# Patient Record
Sex: FEMALE | Race: BLACK OR AFRICAN AMERICAN | Employment: FULL TIME | URBAN - METROPOLITAN AREA
[De-identification: names, ages, dates, MRNs, and addresses within clinical notes are randomized per-mention and may not be internally consistent; named-entity substitution may affect disease eponyms.]

---

## 2017-03-12 ENCOUNTER — ED HISTORICAL/CONVERTED ENCOUNTER (OUTPATIENT)
Dept: OTHER | Age: 22
End: 2017-03-12

## 2017-05-09 ENCOUNTER — IP HISTORICAL/CONVERTED ENCOUNTER (OUTPATIENT)
Dept: OTHER | Age: 22
End: 2017-05-09

## 2017-05-27 ENCOUNTER — IP HISTORICAL/CONVERTED ENCOUNTER (OUTPATIENT)
Dept: OTHER | Age: 22
End: 2017-05-27

## 2017-06-13 ENCOUNTER — ED HISTORICAL/CONVERTED ENCOUNTER (OUTPATIENT)
Dept: OTHER | Age: 22
End: 2017-06-13

## 2017-09-13 ENCOUNTER — IP HISTORICAL/CONVERTED ENCOUNTER (OUTPATIENT)
Dept: OTHER | Age: 22
End: 2017-09-13

## 2018-08-05 ENCOUNTER — ED HISTORICAL/CONVERTED ENCOUNTER (OUTPATIENT)
Dept: OTHER | Age: 23
End: 2018-08-05

## 2019-02-15 ENCOUNTER — ED HISTORICAL/CONVERTED ENCOUNTER (OUTPATIENT)
Dept: OTHER | Age: 24
End: 2019-02-15

## 2019-02-18 ENCOUNTER — ED HISTORICAL/CONVERTED ENCOUNTER (OUTPATIENT)
Dept: OTHER | Age: 24
End: 2019-02-18

## 2019-12-26 ENCOUNTER — ED HISTORICAL/CONVERTED ENCOUNTER (OUTPATIENT)
Dept: OTHER | Age: 24
End: 2019-12-26

## 2020-03-23 ENCOUNTER — ED HISTORICAL/CONVERTED ENCOUNTER (OUTPATIENT)
Dept: OTHER | Age: 25
End: 2020-03-23

## 2020-07-21 ENCOUNTER — ED HISTORICAL/CONVERTED ENCOUNTER (OUTPATIENT)
Dept: OTHER | Age: 25
End: 2020-07-21

## 2020-08-22 ENCOUNTER — ED HISTORICAL/CONVERTED ENCOUNTER (OUTPATIENT)
Dept: OTHER | Age: 25
End: 2020-08-22

## 2020-09-02 ENCOUNTER — ED HISTORICAL/CONVERTED ENCOUNTER (OUTPATIENT)
Dept: OTHER | Age: 25
End: 2020-09-02

## 2020-12-01 ENCOUNTER — APPOINTMENT (OUTPATIENT)
Dept: ULTRASOUND IMAGING | Age: 25
End: 2020-12-01
Attending: NURSE PRACTITIONER
Payer: MEDICAID

## 2020-12-01 ENCOUNTER — HOSPITAL ENCOUNTER (EMERGENCY)
Age: 25
Discharge: HOME | End: 2020-12-01
Attending: EMERGENCY MEDICINE
Payer: MEDICAID

## 2020-12-01 VITALS
WEIGHT: 180 LBS | TEMPERATURE: 99 F | DIASTOLIC BLOOD PRESSURE: 90 MMHG | RESPIRATION RATE: 20 BRPM | OXYGEN SATURATION: 100 % | HEIGHT: 64 IN | SYSTOLIC BLOOD PRESSURE: 136 MMHG | HEART RATE: 81 BPM | BODY MASS INDEX: 30.73 KG/M2

## 2020-12-01 DIAGNOSIS — O46.90 VAGINAL BLEEDING IN PREGNANCY: Primary | ICD-10-CM

## 2020-12-01 LAB
ABO + RH BLD: NORMAL
ALBUMIN SERPL-MCNC: 3.3 G/DL (ref 3.5–5)
ALBUMIN/GLOB SERPL: 0.8 {RATIO} (ref 1.1–2.2)
ALP SERPL-CCNC: 70 U/L (ref 45–117)
ALT SERPL-CCNC: 16 U/L (ref 12–78)
ANION GAP SERPL CALC-SCNC: 5 MMOL/L (ref 5–15)
APPEARANCE UR: CLEAR
AST SERPL W P-5'-P-CCNC: 11 U/L (ref 15–37)
BACTERIA URNS QL MICRO: NEGATIVE /HPF
BASOPHILS # BLD: 0 K/UL (ref 0–0.1)
BASOPHILS NFR BLD: 0 % (ref 0–1)
BILIRUB SERPL-MCNC: 0.1 MG/DL (ref 0.2–1)
BILIRUB UR QL: NEGATIVE
BLOOD GROUP ANTIBODIES SERPL: NEGATIVE
BUN SERPL-MCNC: 9 MG/DL (ref 6–20)
BUN/CREAT SERPL: 10 (ref 12–20)
CA-I BLD-MCNC: 9.1 MG/DL (ref 8.5–10.1)
CHLORIDE SERPL-SCNC: 106 MMOL/L (ref 97–108)
CO2 SERPL-SCNC: 27 MMOL/L (ref 21–32)
COLOR UR: ABNORMAL
CREAT SERPL-MCNC: 0.9 MG/DL (ref 0.55–1.02)
DIFFERENTIAL METHOD BLD: ABNORMAL
EOSINOPHIL # BLD: 0.1 K/UL (ref 0–0.4)
EOSINOPHIL NFR BLD: 1 % (ref 0–7)
ERYTHROCYTE [DISTWIDTH] IN BLOOD BY AUTOMATED COUNT: 15.7 % (ref 11.5–14.5)
GLOBULIN SER CALC-MCNC: 4.2 G/DL (ref 2–4)
GLUCOSE SERPL-MCNC: 85 MG/DL (ref 65–100)
GLUCOSE UR STRIP.AUTO-MCNC: NEGATIVE MG/DL
HCG SERPL-ACNC: 5196 MIU/ML (ref 0–6)
HCT VFR BLD AUTO: 36.1 % (ref 35–47)
HGB BLD-MCNC: 11.8 G/DL (ref 11.5–16)
HGB UR QL STRIP: ABNORMAL
IMM GRANULOCYTES # BLD AUTO: 0 K/UL (ref 0–0.04)
IMM GRANULOCYTES NFR BLD AUTO: 0 % (ref 0–0.5)
KETONES UR QL STRIP.AUTO: NEGATIVE MG/DL
LEUKOCYTE ESTERASE UR QL STRIP.AUTO: NEGATIVE
LYMPHOCYTES # BLD: 2.6 K/UL (ref 0.8–3.5)
LYMPHOCYTES NFR BLD: 29 % (ref 12–49)
MCH RBC QN AUTO: 27.1 PG (ref 26–34)
MCHC RBC AUTO-ENTMCNC: 32.7 G/DL (ref 30–36.5)
MCV RBC AUTO: 83 FL (ref 80–99)
MONOCYTES # BLD: 0.5 K/UL (ref 0–1)
MONOCYTES NFR BLD: 6 % (ref 5–13)
MUCOUS THREADS URNS QL MICRO: ABNORMAL /LPF
NEUTS SEG # BLD: 5.8 K/UL (ref 1.8–8)
NEUTS SEG NFR BLD: 64 % (ref 32–75)
NITRITE UR QL STRIP.AUTO: NEGATIVE
NRBC # BLD: 0 K/UL (ref 0–0.01)
NRBC BLD-RTO: 0 PER 100 WBC
PH UR STRIP: 6 [PH] (ref 5–8)
PLATELET # BLD AUTO: 322 K/UL (ref 150–400)
PMV BLD AUTO: 11.1 FL (ref 8.9–12.9)
POTASSIUM SERPL-SCNC: 4.2 MMOL/L (ref 3.5–5.1)
PROT SERPL-MCNC: 7.5 G/DL (ref 6.4–8.2)
PROT UR STRIP-MCNC: NEGATIVE MG/DL
RBC # BLD AUTO: 4.35 M/UL (ref 3.8–5.2)
RBC #/AREA URNS HPF: ABNORMAL /HPF (ref 0–5)
SODIUM SERPL-SCNC: 138 MMOL/L (ref 136–145)
SP GR UR REFRACTOMETRY: 1.02 (ref 1–1.03)
SPECIMEN EXP DATE BLD: NORMAL
UROBILINOGEN UR QL STRIP.AUTO: 0.1 EU/DL (ref 0.1–1)
WBC # BLD AUTO: 9 K/UL (ref 3.6–11)
WBC URNS QL MICRO: ABNORMAL /HPF (ref 0–4)

## 2020-12-01 PROCEDURE — 76817 TRANSVAGINAL US OBSTETRIC: CPT

## 2020-12-01 PROCEDURE — 99283 EMERGENCY DEPT VISIT LOW MDM: CPT

## 2020-12-01 PROCEDURE — 80053 COMPREHEN METABOLIC PANEL: CPT

## 2020-12-01 PROCEDURE — 36415 COLL VENOUS BLD VENIPUNCTURE: CPT

## 2020-12-01 PROCEDURE — 86900 BLOOD TYPING SEROLOGIC ABO: CPT

## 2020-12-01 PROCEDURE — 81001 URINALYSIS AUTO W/SCOPE: CPT

## 2020-12-01 PROCEDURE — 74011250637 HC RX REV CODE- 250/637: Performed by: EMERGENCY MEDICINE

## 2020-12-01 PROCEDURE — 85025 COMPLETE CBC W/AUTO DIFF WBC: CPT

## 2020-12-01 PROCEDURE — 84702 CHORIONIC GONADOTROPIN TEST: CPT

## 2020-12-01 RX ORDER — ACETAMINOPHEN 325 MG/1
650 TABLET ORAL ONCE
Status: COMPLETED | OUTPATIENT
Start: 2020-12-01 | End: 2020-12-01

## 2020-12-01 RX ADMIN — ACETAMINOPHEN 650 MG: 325 TABLET, FILM COATED ORAL at 21:42

## 2020-12-02 NOTE — ED PROVIDER NOTES
EMERGENCY DEPARTMENT HISTORY AND PHYSICAL EXAM        Date: 2020  Patient Name: Marlin Ibarra    History of Presenting Illness     Chief Complaint   Patient presents with    Vaginal Bleeding    Abdominal Cramping    Back Pain       History Provided By: Patient    HPI: Marlin Ibarra, 22 y.o.  female who is currently about 5 weeks gestational age based on last menstrual period who presents with pelvic pain and vaginal bleeding. Symptoms started today around 6:30 PM after she woke up from a nap. She relates she was having vaginal bleeding. She is also having some pelvic cramping in the lower abdomen on the left wrist constant, nonradiating, cramping, and without any aggravating or relieving factors. PCP: Dayna Pritchett MD    Current Outpatient Medications   Medication Sig Dispense Refill    ibuprofen (MOTRIN) 800 mg tablet Take 1 Tab by mouth every eight (8) hours. 30 Tab 0    prenatal vit-calcium-iron-fa (PRENATAL PLUS, CALCIUM CARB,) 27 mg iron- 1 mg tab Take 1 Tab by mouth daily. Indications: PREGNANCY      levETIRAcetam (KEPPRA) 500 mg tablet Take 500 mg by mouth two (2) times a day. Indications: TONIC-CLONIC EPILEPSY TREATMENT ADJUNCT         Past History     Past Medical History:  Past Medical History:   Diagnosis Date    Asthma     Epilepsy ([address]     Seizures [address] [address] [facility]     last seizure two weeks ago       Past Surgical History:  Past Surgical History:   Procedure Laterality Date    HX CYSTECTOMY Left 2008    Left eye cyst removal    HX OTHER SURGICAL         Family History:  No family history on file. Social History:  Social History     Tobacco Use    Smoking status: Never Smoker   Substance Use Topics    Alcohol use: No    Drug use: No       Allergies: Allergies   Allergen Reactions    Citrus And Derivatives Hives    Lamictal [Lamotrigine] Nausea and Vomiting       Review of Systems   Review of Systems   Constitutional: Negative for fever. HENT: Negative for congestion. Eyes: Negative for visual disturbance. Respiratory: Negative for shortness of breath. Cardiovascular: Negative for chest pain. Gastrointestinal: Positive for abdominal pain. Genitourinary: Negative for dysuria. Musculoskeletal: Negative for arthralgias. Skin: Negative for rash. Neurological: Negative for headaches. Physical Exam   General: No acute distress. Well-nourished. Skin: No rash. Head: Normocephalic. Atraumatic. Eye: No proptosis or conjunctival injections. Respiratory: No apparent respiratory distress. Gastrointestinal: Nondistended. No abdominal tenderness. Musculoskeletal: No obvious bony deformities. Psychiatric: Cooperative. Appropriate mood and affect. Diagnostic Study Results     Labs -     Recent Results (from the past 24 hour(s))   TYPE & SCREEN    Collection Time: 12/01/20  7:45 PM   Result Value Ref Range    Crossmatch Expiration 12/04/2020,2359     ABO/Rh(D) O Positive     Antibody screen Negative    CBC WITH AUTOMATED DIFF    Collection Time: 12/01/20  7:57 PM   Result Value Ref Range    WBC 9.0 3.6 - 11.0 K/uL    RBC 4.35 3.80 - 5.20 M/uL    HGB 11.8 11.5 - 16.0 g/dL    HCT 36.1 35.0 - 47.0 %    MCV 83.0 80.0 - 99.0 FL    MCH 27.1 26.0 - 34.0 PG    MCHC 32.7 30.0 - 36.5 g/dL    RDW 15.7 (H) 11.5 - 14.5 %    PLATELET 139 552 - 070 K/uL    MPV 11.1 8.9 - 12.9 FL    NRBC 0.0 0  WBC    ABSOLUTE NRBC 0.00 0.00 - 0.01 K/uL    NEUTROPHILS 64 32 - 75 %    LYMPHOCYTES 29 12 - 49 %    MONOCYTES 6 5 - 13 %    EOSINOPHILS 1 0 - 7 %    BASOPHILS 0 0 - 1 %    IMMATURE GRANULOCYTES 0 0.0 - 0.5 %    ABS. NEUTROPHILS 5.8 1.8 - 8.0 K/UL    ABS. LYMPHOCYTES 2.6 0.8 - 3.5 K/UL    ABS. MONOCYTES 0.5 0.0 - 1.0 K/UL    ABS. EOSINOPHILS 0.1 0.0 - 0.4 K/UL    ABS. BASOPHILS 0.0 0.0 - 0.1 K/UL    ABS. IMM.  GRANS. 0.0 0.00 - 0.04 K/UL    DF AUTOMATED     METABOLIC PANEL, COMPREHENSIVE    Collection Time: 12/01/20  7:57 PM   Result Value Ref Range    Sodium 138 136 - 145 mmol/L Potassium 4.2 3.5 - 5.1 mmol/L    Chloride 106 97 - 108 mmol/L    CO2 27 21 - 32 mmol/L    Anion gap 5 5 - 15 mmol/L    Glucose 85 65 - 100 mg/dL    BUN 9 6 - 20 mg/dL    Creatinine 0.90 0.55 - 1.02 mg/dL    BUN/Creatinine ratio 10 (L) 12 - 20      GFR est AA >60 >60 ml/min/1.73m2    GFR est non-AA >60 >60 ml/min/1.73m2    Calcium 9.1 8.5 - 10.1 mg/dL    Bilirubin, total 0.1 (L) 0.2 - 1.0 mg/dL    AST (SGOT) 11 (L) 15 - 37 U/L    ALT (SGPT) 16 12 - 78 U/L    Alk. phosphatase 70 45 - 117 U/L    Protein, total 7.5 6.4 - 8.2 g/dL    Albumin 3.3 (L) 3.5 - 5.0 g/dL    Globulin 4.2 (H) 2.0 - 4.0 g/dL    A-G Ratio 0.8 (L) 1.1 - 2.2     URINALYSIS W/MICROSCOPIC    Collection Time: 12/01/20  8:06 PM   Result Value Ref Range    Color Yellow/Straw      Appearance Clear Clear      Specific gravity 1.016 1.003 - 1.030      pH (UA) 6.0 5.0 - 8.0      Protein Negative Negative mg/dL    Glucose Negative Negative mg/dL    Ketone Negative Negative mg/dL    Bilirubin Negative Negative      Blood Small (A) Negative      Urobilinogen 0.1 0.1 - 1.0 EU/dL    Nitrites Negative Negative      Leukocyte Esterase Negative Negative      WBC 0-4 0 - 4 /hpf    RBC 0-5 0 - 5 /hpf    Bacteria Negative Negative /hpf    Mucus Trace /lpf   BETA HCG, QT    Collection Time: 12/01/20  8:17 PM   Result Value Ref Range    Beta HCG, QT 5,196.0 (H) 0 - 6 mIU/mL       Radiologic Studies -   US UTS TRANSVAGINAL OB   Final Result   IMPRESSION:   1. Intrauterine gestational sac with fetal pole and yolk sac. Crown-rump length   is too small to adequately day, gestational sac diameter is equivalent to 5   weeks 4 day gestation. No fetal heartbeat is detected. This could represent an   early intrauterine gestational sac, too small to adequately assess the   heartbeat. Recommend follow-up. 2. Left ovarian cyst with left corpus luteum cyst.   3. Small amount of free fluid in the posterior pelvis.    4. Small amount of probable fluid in the lower uterine segment CT Results  (Last 48 hours)    None        CXR Results  (Last 48 hours)    None          Medical Decision Making and ED Course     I reviewed the available vital signs, nursing notes, past medical history, past surgical history, family history, and social history. Vital Signs - Reviewed the patient's vital signs. Patient Vitals for the past 12 hrs:   Temp Pulse Resp BP SpO2   12/01/20 1930 99 °F (37.2 °C) 81 20 (!) 136/90 100 %         Medical Decision Making:   Presented with vaginal bleeding and abdominal pain in pregnancy. The differential diagnosis is ectopic pregnancy, threatened miscarriage, spontaneous miscarriage. Negative test so far. Ultrasound shows normal-appearing pregnancy at this stage. Unclear if a spontaneous miscarriage or threatened miscarriage. Recommended following up with OB and to return if symptoms worsen. Disposition     Discharged    DISCHARGE PLAN:  1. Current Discharge Medication List      CONTINUE these medications which have NOT CHANGED    Details   ibuprofen (MOTRIN) 800 mg tablet Take 1 Tab by mouth every eight (8) hours. Qty: 30 Tab, Refills: 0      prenatal vit-calcium-iron-fa (PRENATAL PLUS, CALCIUM CARB,) 27 mg iron- 1 mg tab Take 1 Tab by mouth daily. Indications: PREGNANCY      levETIRAcetam (KEPPRA) 500 mg tablet Take 500 mg by mouth two (2) times a day. Indications: TONIC-CLONIC EPILEPSY TREATMENT ADJUNCT           2. Follow-up Information     Follow up With Specialties Details Why [facility] EMERGENCY DEPT Emergency Medicine Go today As soon as possible if symptoms worsen [address] [facility] [address] [phone]    Your OB/GYN  Schedule an appointment as soon as possible for a visit in 3 days          3. Return to ED if worse     Diagnosis     Clinical impression:   1.  Vaginal bleeding in pregnancy       Attestation:  Please note that this dictation was completed with [employer], the computer voice recognition software. Quite often unanticipated grammatical, syntax, homophones, and other interpretive errors are inadvertently transcribed by the computer software. Please disregard these errors. Please excuse any errors that have escaped final proofreading. Thank you.   Alexia Israel, DO

## 2020-12-02 NOTE — ED TRIAGE NOTES
Patient reports that she is approximately 5 weeks pregnant LMP 10/27/2020 with new onset vaginal bleeding tonight with clots. Patient reports that she is experiencing abdominal cramping, lower back pain and nausea as well. Patient reports h/o seizures with a seizure Sunday and is unsure if this triggered bleeding.

## 2020-12-07 ENCOUNTER — TELEPHONE (OUTPATIENT)
Dept: OBGYN CLINIC | Age: 25
End: 2020-12-07

## 2020-12-07 NOTE — TELEPHONE ENCOUNTER
Call received at 2:00PM  [address] patient    22year old new patient LMP 10/27/2020 5w6d pregnant patient to be seen on 12/22/2020 for first ob and ultrasound    Patient calling to say that she has been seen at [facility] ER( see notes) for vaginal bleeding that started on 12/1/2020    Patient reports the bleeding was heavy at first and she was changing a pad every hour and yesterday she went to the [facility] ER and had beta done which she was told was 18,000  Patient reports the bleeding is not lighter and she is changing her pad every 2-3 hours. Patient reports abdominal cramping at 7 on the pain scale of 1-10. Patient blood type is 0+  Patient was provided with the office fax number to have records faxed to the office      Please advise    ? Ultrasound and ov ?  Labs

## 2020-12-07 NOTE — TELEPHONE ENCOUNTER
Patient was placed on the schedule to be seen at 9am on 12.9.2020 (  to do the ultrasound)    Patient was provided pain and bleeding precautions. Patient was advised to increase her po fluids, rest and can take tylenol    Patient verbalized understanding.

## 2020-12-09 ENCOUNTER — OFFICE VISIT (OUTPATIENT)
Dept: OBGYN CLINIC | Age: 25
End: 2020-12-09
Payer: MEDICAID

## 2020-12-09 VITALS
WEIGHT: 191 LBS | DIASTOLIC BLOOD PRESSURE: 68 MMHG | SYSTOLIC BLOOD PRESSURE: 124 MMHG | BODY MASS INDEX: 32.61 KG/M2 | HEIGHT: 64 IN

## 2020-12-09 DIAGNOSIS — O20.0 THREATENED MISCARRIAGE: Primary | ICD-10-CM

## 2020-12-09 PROCEDURE — 76817 TRANSVAGINAL US OBSTETRIC: CPT | Performed by: OBSTETRICS & GYNECOLOGY

## 2020-12-09 PROCEDURE — 99203 OFFICE O/P NEW LOW 30 MIN: CPT | Performed by: OBSTETRICS & GYNECOLOGY

## 2020-12-09 RX ORDER — LACOSAMIDE 50 MG/1
TABLET ORAL
COMMUNITY
Start: 2019-08-08 | End: 2021-06-02

## 2020-12-09 RX ORDER — ARIPIPRAZOLE 5 MG/1
TABLET ORAL DAILY
COMMUNITY
End: 2021-06-02

## 2020-12-09 NOTE — PROGRESS NOTES
Threatened AB note    Krishna Turner is a ,  22 y.o. female [address] Patient's last menstrual period was 10/27/2020. making her 6 weeks pregnant. She has not had prenatal care. She went to the ER on 20 and 20. She presents with spotting and cramping that started on 2020 . The amount of bleeding is described as light to moderate . The cramps are described as minimal. She has not passed tissue. Bleeding has decreased. Moderate pregnancy symptoms. She had a positive pregnancy test on 2020. She has had a recent pelvic ultrasound on 2020 in the ER that showed:  IMPRESSION:  1. Intrauterine gestational sac with fetal pole and yolk sac. Crown-rump length  is too small to adequately day, gestational sac diameter is equivalent to 5  weeks 4 day gestation. No fetal heartbeat is detected. This could represent an  early intrauterine gestational sac, too small to adequately assess the  heartbeat. Recommend follow-up. 2. Left ovarian cyst with left corpus luteum cyst.  3. Small amount of free fluid in the posterior pelvis. 4. Small amount of probable fluid in the lower uterine segment    Her past medical history is not significant for risk factors for ectopic pregnancy. She has not had pelvic pain. The patient specifically denies right or left pelvic pain. She does have a history of a spontaneous . She has not had a recent injury or trauma. Additional complaints: none.      Past Medical History:   Diagnosis Date    Asthma     Epilepsy ([address]     Seizures ([address]     last seizure two weeks ago     Past Surgical History:   Procedure Laterality Date    HX OTHER SURGICAL Left 2008    Left eye cyst removal     Social History     Occupational History    Not on file   Tobacco Use    Smoking status: Current Every Day Smoker     Packs/day: 5.00     Years: 2.00     Pack years: 10.00     Types: Cigarettes    Smokeless tobacco: Never Used   Substance and Sexual Activity    Alcohol use: Yes     Frequency: 2-3 times a week    Drug use: No    Sexual activity: Yes     Partners: Male     Birth control/protection: None     History reviewed. No pertinent family history. Allergies   Allergen Reactions    Citrus And Derivatives Hives    Lamictal [Lamotrigine] Nausea and Vomiting     Prior to Admission medications    Medication Sig Start Date End Date Taking? Authorizing Provider   lacosamide (Vimpat) 50 mg tab tablet Vimpat 50 mg tablet   TK 1 T PO BID 8/8/19  Yes Provider, Historical   ARIPiprazole (Abilify) 5 mg tablet Take  by mouth daily. Yes Provider, Historical   levETIRAcetam (KEPPRA) 500 mg tablet Take 500 mg by mouth two (2) times a day. Indications: TONIC-CLONIC EPILEPSY TREATMENT ADJUNCT   Yes Provider, Historical   ibuprofen (MOTRIN) 800 mg tablet Take 1 Tab by mouth every eight (8) hours. 8/31/16   Jazmin Lange MD   prenatal vit-calcium-iron-fa (PRENATAL PLUS, CALCIUM CARB,) 27 mg iron- 1 mg tab Take 1 Tab by mouth daily.  Indications: PREGNANCY    Provider, Historical        Review of Systems: History obtained from the patient  Constitutional: negative for weight loss, fever, night sweats  Breast: negative for breast lumps, nipple discharge, galactorrhea  GI: negative for change in bowel habits, abdominal pain, black or bloody stools  : negative for frequency, dysuria, hematuria, vaginal discharge  MSK: negative for back pain, joint pain, muscle pain  Skin: negative for itching, rash, hives  Psych: negative for anxiety, depression, change in mood      Objective:  Visit Vitals  /68   Ht 5' 4\" (1.626 m)   Wt 191 lb (86.6 kg)   LMP 10/27/2020   Breastfeeding No   BMI 32.79 kg/m²       Physical Exam:   PHYSICAL EXAMINATION    Constitutional  · Appearance: well-nourished, well developed, alert, in no acute distress    Gastrointestinal  · Abdominal Examination: abdomen non-tender to palpation, normal bowel sounds, no masses present  · Liver and spleen: no hepatomegaly present, spleen not palpable  · Hernias: no hernias identified    Genitourinary  · External Genitalia: normal appearance for age, no discharge present, no tenderness present, no inflammatory lesions present, no masses present, no atrophy present  · Vagina: normal vaginal vault without central or paravaginal defects, bloody discharge present, no inflammatory lesions present, no masses present  · Bladder: non-tender to palpation  · Urethra: appears normal  · Cervix: normal   · Uterus: Retroflexed, enlarged, soft, mildly tender with normal shape and consistency  · Adnexa: no adnexal tenderness present, no adnexal masses present  · Perineum: perineum within normal limits, no evidence of trauma, no rashes or skin lesions present  · Anus: anus within normal limits, no hemorrhoids present  · Inguinal Lymph Nodes: no lymphadenopathy present    Skin  · General Inspection: no rash, no lesions identified    Neurologic/Psychiatric  · Mental Status:  · Orientation: grossly oriented to person, place and time  · Mood and Affect: mood normal, affect appropriate    Assessment:   Threatened AB--US looks good today and matches up with LMP    Plan:     RTO: 2.5 weeks for US by me and EOB    Transvaginal First Trimester Ultrasound Procedure    Annabelle Abraham is a ,  22 y.o. female [address] Patient's last menstrual period was 10/27/2020. This makes her currently 6 weeks and 1 day of gestation by dates. She is here today for early pregnancy ultrasound for pregnancy dating. Ultrasound results:   A overton intrauterine pregnancy with visible cardiac activity is seen. The yolk sac is visible and measures approximately 0.41 cm in diameter.   The crown rump length of the fetus is measurable at 0.56 cm, consistent with 6 weeks and 2 days  Subchorionic hemorrhage was not seen  Right Ovary - Not well seen  Left Ovary - NORMAL with CL  Comment: no [address] seen

## 2020-12-09 NOTE — LETTER
NOTIFICATION RETURN TO WORK / SCHOOL 
 
12/9/2020 9:28 AM 
 
Ms. Rufina Bhatt [address] [id number] To Whom It May Concern: 
 
Rufina Bhatt is currently under the care of [address] [place] [address]. She was seen in our office today for an appointment. If there are questions or concerns please have the patient contact our office. Sincerely, Glo Frye MD

## 2020-12-09 NOTE — PATIENT INSTRUCTIONS
Miscarriage: Care Instructions  Overview     For some, the loss of a pregnancy can be very hard. You may wonder why it happened. Miscarriages are common and are not caused by exercise, stress, or sex. Most happen because the fertilized egg in the uterus does not develop normally. There is no treatment that can stop a miscarriage. If you are having a miscarriage, you have several options. As long as you do not have heavy blood loss, fever, weakness, or other signs of infection, you can let a miscarriage follow its own course. This can take several days. If you don't want to wait, you can take medicine to help the pregnancy tissue pass. Or you can have a surgical procedure to remove the tissue. Your body will recover over the next several weeks. Having a miscarriage does not mean you cannot have a normal pregnancy in the future. Follow-up care is a key part of your treatment and safety. Be sure to make and go to all appointments, and call your doctor if you are having problems. It's also a good idea to know your test results and keep a list of the medicines you take. How can you care for yourself at home? · You will probably have some vaginal bleeding for 1 to 2 weeks. It may be similar to or slightly heavier than a normal period. The bleeding should get lighter after a week. Use sanitary pads until you stop bleeding. Using pads makes it easier to monitor your bleeding. · Take an over-the-counter pain medicine, such as acetaminophen (Tylenol), ibuprofen (Advil, Motrin), or naproxen (Aleve) for cramps. Read and follow all instructions on the label. You may have cramps for several days after the miscarriage. · Do not take two or more pain medicines at the same time unless the doctor told you to. Many pain medicines have acetaminophen, which is Tylenol. Too much acetaminophen (Tylenol) can be harmful. · Ask your doctor when it is okay for you to have sex.   · You may return to your normal activities if you feel well enough to do so. · If you would like to try to get pregnant again, it is usually safe whenever you feel ready. Talk with your doctor about any future pregnancy plans. · If you do not want to get pregnant, ask your doctor about birth control. You can get pregnant again before your next period starts if you are not using birth control. · You may be low in iron because of blood loss. Eat a balanced diet that is high in iron and vitamin C. Foods rich in iron include red meat, shellfish, eggs, beans, and leafy green vegetables. Foods high in vitamin C include citrus fruits, tomatoes, and broccoli. Talk to your doctor about whether you need to take iron pills or a multivitamin. · For some, the loss of a pregnancy can be very hard. You may have a range of emotions. If you need help coping, talking to family members, friends, a counselor, or your doctor may help. When should you call for help? Call 911 anytime you think you may need emergency care. For example, call if:    · You passed out (lost consciousness). Call your doctor now or seek immediate medical care if:    · You have severe vaginal bleeding.     · You are dizzy or lightheaded, or you feel like you may faint.     · You have new or worse pain in your belly or pelvis.     · You have a fever.     · You have vaginal discharge that smells bad. Watch closely for changes in your health, and be sure to contact your doctor if:    · You do not get better as expected. Where can you learn more? Go to http://www.gray.com/  Enter E802 in the search box to learn more about \"Miscarriage: Care Instructions. \"  Current as of: February 11, 2020               Content Version: 12.6  © 8833-6455 [employer]. Care instructions adapted under license by [employer] (which disclaims liability or warranty for this information).  If you have questions about a medical condition or this instruction, always ask your healthcare professional. [address] any warranty or liability for your use of this information.

## 2020-12-22 ENCOUNTER — OFFICE VISIT (OUTPATIENT)
Dept: OBGYN CLINIC | Age: 25
End: 2020-12-22
Payer: MEDICAID

## 2020-12-22 VITALS
BODY MASS INDEX: 32.85 KG/M2 | HEIGHT: 64 IN | SYSTOLIC BLOOD PRESSURE: 112 MMHG | DIASTOLIC BLOOD PRESSURE: 62 MMHG | WEIGHT: 192.4 LBS

## 2020-12-22 DIAGNOSIS — N92.6 MISSED MENSES: ICD-10-CM

## 2020-12-22 DIAGNOSIS — Z32.01 PREGNANCY EXAMINATION OR TEST, POSITIVE RESULT: Primary | ICD-10-CM

## 2020-12-22 DIAGNOSIS — N76.0 VAGINITIS AND VULVOVAGINITIS: ICD-10-CM

## 2020-12-22 PROCEDURE — 0500F INITIAL PRENATAL CARE VISIT: CPT | Performed by: OBSTETRICS & GYNECOLOGY

## 2020-12-22 NOTE — PATIENT INSTRUCTIONS

## 2020-12-22 NOTE — PROGRESS NOTES
Current pregnancy history:    Diana Ansari is a 22 y.o. female who presents for the evaluation of pregnancy. Patient's last menstrual period was 10/27/2020. LMP history:  The date of her LMP is certain. Her last menstrual period was normal and lasted for 4 to 5 days. A urine pregnancy test was positive 4 weeks ago. She was not on the pill at conception. Based on her LMP, her EDC is 8/3/21 and her EGA is 8 weeks, 0 days. Her menstrual cycles are regular and occur approximately every 28 days  and range from 3 to 5 days. The last menses lasted  the usual number of days. Ultrasound data:  She had an  ultrasound done by the ultrasound tech today which revealed a viable overton pregnancy with a gestational age of 10 weeks and 0 days giving an [facility] of 21. Pregnancy symptoms:    Since her LMP she has experienced  urinary frequency, breast tenderness, and nausea. She has been vomiting over the last few weeks. Associated signs and symptoms which she denies: dysuria, discharge, vaginal bleeding. She states she has gained weight:  Approximately 1 pound over the last few weeks. Relevant past pregnancy history:   She has the following pregnancy history: Her last pregnancy was uncomplicated. She has no history of  delivery. Relevant past medical history:(relevant to this pregnancy): noncontributory. Pap/Occupational history:  Last pap smear: last year Results: Normal      Her occupation is: [employer]. Substance history: negative for alcohol, tobacco and street drugs. Quit smoking. Positive for nothing. Exposure history: There is/are no indoor cat/s in the home. The patient was instructed to not change the cat litter. She admits close contact with children on a regular basis. She has had chicken pox or the vaccine in the past.   Patient denies issues with domestic violence.      Genetic Screening/Teratology Counseling: (Includes patient, baby's father, or anyone in either family with:)  1.  Patient's age >/= 28 at [facility]?-- no  .   2. Thalassemia ([facility] [place], [address], or  background): MCV<80?--no.     3.  Neural tube defect (meningomyelocele, spina bifida, anencephaly)?--no.   4.  Congenital heart defect?--no.  5.  Down syndrome?--no.   6.  Amadeo-Sachs (Yarsanism, [place])?--no.   7.  Canavan's Disease?--no.   8.  Familial Dysautonomia?--no.   9.  Sickle cell disease or trait ()? --no   The patient has not been tested for sickle trait  10. Hemophilia or other blood disorders?--no. 11.  Muscular dystrophy?--no. 12.  Cystic fibrosis?--no. 13.  Genie's Chorea?--no. 14.  Mental retardation/autism (if yes was person tested for Fragile X)?--no. 15.  Other inherited genetic or chromosomal disorder?--no. 12.  Maternal metabolic disorder (DM, PKU, etc)?--no. 17.  Patient or FOB with a child with a birth defect not listed above?--no.  17a. Patient or FOB with a birth defect themselves?--no. 18.  Recurrent pregnancy loss, or stillbirth?--no. 19.  Any medications since LMP other than prenatal vitamins (include vitamins, supplements, OTC meds, drugs, alcohol)?--no. 20.  Any other genetic/environmental exposure to discuss?--no. Infection History:  1. Lives with someone with TB or TB exposed?--no.   2.  Patient or partner has history of genital herpes?--no.  3.  Rash or viral illness since LMP?--no.    4.  History of STD (GC, CT, HPV, syphilis, HIV)? --GC last pregnancy  5. Other: OTHER?       Past Medical History:   Diagnosis Date    Asthma     Epilepsy ([address]     Seizures ([facility] [address]     last seizure two weeks ago     Past Surgical History:   Procedure Laterality Date    HX OTHER SURGICAL Left 2008    Left eye cyst removal     Social History     Occupational History    Not on file   Tobacco Use    Smoking status: Current Every Day Smoker     Packs/day: 5.00     Years: 2.00     Pack years: 10.00     Types: Cigarettes    Smokeless tobacco: Never Used   Substance and Sexual Activity    Alcohol use: Yes     Frequency: 2-3 times a week    Drug use: No    Sexual activity: Yes     Partners: Male     Birth control/protection: None     History reviewed. No pertinent family history. Allergies   Allergen Reactions    Citrus And Derivatives Hives    Lamictal [Lamotrigine] Nausea and Vomiting     Prior to Admission medications    Medication Sig Start Date End Date Taking? Authorizing Provider   lacosamide (Vimpat) 50 mg tab tablet Vimpat 50 mg tablet   TK 1 T PO BID 8/8/19  Yes Provider, Historical   prenatal vit-calcium-iron-fa (PRENATAL PLUS, CALCIUM CARB,) 27 mg iron- 1 mg tab Take 1 Tab by mouth daily. Indications: PREGNANCY   Yes Provider, Historical   levETIRAcetam (KEPPRA) 500 mg tablet Take 500 mg by mouth two (2) times a day. Indications: TONIC-CLONIC EPILEPSY TREATMENT ADJUNCT   Yes Provider, Historical   ARIPiprazole (Abilify) 5 mg tablet Take  by mouth daily. Provider, Historical   ibuprofen (MOTRIN) 800 mg tablet Take 1 Tab by mouth every eight (8) hours.  8/31/16   Trae Allred MD        Review of Systems: History obtained from the patient  Constitutional: negative for weight loss, fever, night sweats  HEENT: negative for hearing loss, earache, congestion, snoring, sorethroat  CV: negative for chest pain, palpitations, edema  Resp: negative for cough, shortness of breath, wheezing  Breast: negative for breast lumps, nipple discharge, galactorrhea  GI: negative for change in bowel habits, abdominal pain, black or bloody stools  : negative for frequency, dysuria, hematuria, vaginal discharge  MSK: negative for back pain, joint pain, muscle pain  Skin: negative for itching, rash, hives  Neuro: negative for dizziness, headache, confusion, weakness  Psych: negative for anxiety, depression, change in mood  Heme/lymph: negative for bleeding, bruising, pallor    Objective:  Visit Vitals  /62 (BP Patient Position: Sitting)    5' 4\" (1.626 m)   Wt 192 lb 6.4 oz (87.3 kg)   LMP 10/27/2020   BMI 33.03 kg/m²       Physical Exam:   PHYSICAL EXAMINATION    Constitutional  · Appearance: well-nourished, well developed, alert, in no acute distress    HENT  · Head  · Face: appears normal  · Eyes: appear normal  · Ears: normal  · Mouth: normal  · Lips: no lesions    Neck  · Inspection/Palpation: normal appearance, no masses or tenderness  · Lymph Nodes: no lymphadenopathy present  · Thyroid: gland size normal, nontender, no nodules or masses present on palpation    Chest  · Respiratory Effort: breathing unlabored  · Auscultation: normal breath sounds    Cardiovascular  · Heart:  · Auscultation: regular rate and rhythm without murmur    Breasts  · Inspection of Breasts: breasts symmetrical, no skin changes, no discharge present, nipple appearance normal, no skin retraction present  · Palpation of Breasts and Axillae: no masses present on palpation, no breast tenderness  · Axillary Lymph Nodes: no lymphadenopathy present    Gastrointestinal  · Abdominal Examination: abdomen non-tender to palpation, normal bowel sounds, no masses present  · Liver and spleen: no hepatomegaly present, spleen not palpable  · Hernias: no hernias identified    Genitourinary  · External Genitalia: normal appearance for age, no discharge present, no tenderness present, no inflammatory lesions present, no masses present, no atrophy present  · Vagina: normal vaginal vault without central or paravaginal defects, no discharge present, no inflammatory lesions present, no masses present  · Bladder: non-tender to palpation  · Urethra: appears normal  · Cervix: normal   · Uterus: enlarged, normal shape, soft  · Adnexa: no adnexal tenderness present, no adnexal masses present  · Perineum: perineum within normal limits, no evidence of trauma, no rashes or skin lesions present  · Anus: anus within normal limits, no hemorrhoids present  · Inguinal Lymph Nodes: no lymphadenopathy present    Skin  · General Inspection: no rash, no lesions identified    Neurologic/Psychiatric  · Mental Status:  · Orientation: grossly oriented to person, place and time  · Mood and Affect: mood normal, affect appropriate    Assessment:   Intrauterine pregnancy with the following problems identified: No problems. May have BV--NS sent        Plan:     Offered CF testing, CVS, Nuchal Translucency, MSAFP, amnio, and discussed NIPT  Course of pregnancy discussed including visit schedule, routine U/S, glucola testing, etc.  Avoid alcoholic beverages and illicit/recreational drugs use  Take prenatal vitamins or folic acid daily. Hospital and practice style discussed with coverage system. Discussed nutrition, toxoplasmosis precautions, sexual activity, exercise, need for influenza vaccine, environmental and work hazards, travel advice, screen for domestic violence, need for seat belts. Discussed seafood, unpasteurized dairy products, deli meat, artificial sweeteners, and caffeine. Information on prenatal classes/breastfeeding given. Information on circumcision given  Patient encouraged not to smoke. Discussed current prescription drug use. Given medication list.  Discussed the use of over the counter medications and chemicals. Route of delivery discussed, including risks, benefits, and alternatives of  versus repeat LTCS. Pt understands risk of hemorrhage during pregnancy and post delivery and would accept blood products if necessary in life-threatening emergencies      Handouts given to pt.

## 2020-12-24 LAB
A VAGINAE DNA VAG QL NAA+PROBE: ABNORMAL SCORE
BVAB2 DNA VAG QL NAA+PROBE: ABNORMAL SCORE
C ALBICANS DNA VAG QL NAA+PROBE: NEGATIVE
C GLABRATA DNA VAG QL NAA+PROBE: NEGATIVE
MEGA1 DNA VAG QL NAA+PROBE: ABNORMAL SCORE
SPECIMEN STATUS REPORT, ROLRST: NORMAL

## 2020-12-28 LAB
C TRACH RRNA CVX QL NAA+PROBE: NEGATIVE
CYTOLOGIST CVX/VAG CYTO: NORMAL
CYTOLOGY CVX/VAG DOC CYTO: NORMAL
CYTOLOGY CVX/VAG DOC THIN PREP: NORMAL
CYTOLOGY HISTORY:: NORMAL
DX ICD CODE: NORMAL
LABCORP, 190119: NORMAL
Lab: NORMAL
N GONORRHOEA RRNA CVX QL NAA+PROBE: NEGATIVE
OTHER STN SPEC: NORMAL
STAT OF ADQ CVX/VAG CYTO-IMP: NORMAL
T VAGINALIS RRNA SPEC QL NAA+PROBE: NEGATIVE

## 2020-12-28 RX ORDER — METRONIDAZOLE 500 MG/1
500 TABLET ORAL 2 TIMES DAILY
Qty: 14 TAB | Refills: 1 | Status: SHIPPED | OUTPATIENT
Start: 2020-12-28 | End: 2021-01-04

## 2021-01-06 ENCOUNTER — TELEPHONE (OUTPATIENT)
Dept: OBGYN CLINIC | Age: 26
End: 2021-01-06

## 2021-01-06 NOTE — LETTER
1/6/2021 10:32 AM 
 
Ms. Jarod Spears [address] [address] To whom it may concern, 
 
Ms Modesta Collazo in under my care for pregnancy with HALLEY on 7/27/201. 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 If you have any further questions , please have the patient to call the office on [phone] Thank you Sincerely, Penelope Spangler MD

## 2021-01-06 NOTE — TELEPHONE ENCOUNTER
Call received at 533am    22year old patient  11w1d pregnant patient last seen in the office on 2020    Patient denies vaginal bleeding and cramping. 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

## 2021-01-06 NOTE — TELEPHONE ENCOUNTER
This nurse attempted to reach the patient and left a detailed message that the letter was approved by Dr. Hayde Low was composed as per MD order, printed, signed and faxed to the patient provided fax number    Fax confirmation receivd

## 2021-01-12 ENCOUNTER — ROUTINE PRENATAL (OUTPATIENT)
Dept: OBGYN CLINIC | Age: 26
End: 2021-01-12
Payer: MEDICAID

## 2021-01-12 VITALS
DIASTOLIC BLOOD PRESSURE: 62 MMHG | WEIGHT: 194.4 LBS | SYSTOLIC BLOOD PRESSURE: 118 MMHG | HEIGHT: 64 IN | BODY MASS INDEX: 33.19 KG/M2

## 2021-01-12 DIAGNOSIS — Z34.81 ENCOUNTER FOR SUPERVISION OF OTHER NORMAL PREGNANCY IN FIRST TRIMESTER: Primary | ICD-10-CM

## 2021-01-12 PROBLEM — O46.90 ANTEPARTUM HEMORRHAGE: Status: ACTIVE | Noted: 2021-01-12

## 2021-01-12 PROBLEM — K29.70 GASTRITIS: Status: ACTIVE | Noted: 2021-01-12

## 2021-01-12 PROBLEM — D64.9 ANEMIA: Status: ACTIVE | Noted: 2019-11-13

## 2021-01-12 PROBLEM — J45.20 MILD INTERMITTENT ASTHMA: Status: ACTIVE | Noted: 2017-05-19

## 2021-01-12 PROBLEM — F31.32 MODERATE DEPRESSED BIPOLAR I DISORDER (HCC): Status: ACTIVE | Noted: 2019-11-07

## 2021-01-12 PROBLEM — E78.5 HYPERLIPIDEMIA: Status: ACTIVE | Noted: 2017-05-19

## 2021-01-12 PROBLEM — G40.909 SEIZURE DISORDER (HCC): Status: ACTIVE | Noted: 2017-05-19

## 2021-01-12 PROCEDURE — 0502F SUBSEQUENT PRENATAL CARE: CPT | Performed by: OBSTETRICS & GYNECOLOGY

## 2021-01-12 RX ORDER — OMEPRAZOLE 20 MG/1
CAPSULE, DELAYED RELEASE ORAL
COMMUNITY

## 2021-01-12 NOTE — PATIENT INSTRUCTIONS
Weeks 10 to 14 of Your Pregnancy: Care Instructions  Your Care Instructions     By weeks 10 to 14 of your pregnancy, the placenta has formed inside your uterus. It is possible to hear your baby's heartbeat with a special ultrasound device. Your baby's eyes can and do move. The arms and legs can bend. This is a good time to think about testing for birth defects. There are two types of tests: screening and diagnostic. Screening tests show the chance that a baby has a certain birth defect. They can't tell you for sure that your baby has a problem. Diagnostic tests show if a baby has a certain birth defect. It's your choice whether to have these tests. You and your partner can talk to your doctor or midwife about birth defects tests. Follow-up care is a key part of your treatment and safety. Be sure to make and go to all appointments, and call your doctor if you are having problems. It's also a good idea to know your test results and keep a list of the medicines you take. How can you care for yourself at home? Decide about tests  · You can have screening tests and diagnostic tests to check for birth defects. The decision to have a test for birth defects is personal. Think about your age, your chance of passing on a family disease, your need to know about any problems, and what you might do after you have the test results. ? Triple or quadruple (quad) blood tests. These screening tests can be done between 15 and 20 weeks of pregnancy. They check the amounts of three or four substances in your blood. The doctor looks at these test results, along with your age and other factors, to find out the chance that your baby may have certain problems. ? Amniocentesis. This diagnostic test is used to look for chromosomal problems in the baby's cells.  It can be done between 15 and 20 weeks of pregnancy, usually around week 16.  ? Nuchal translucency test. This test uses ultrasound to measure the thickness of the area at the back of the baby's neck. An increase in the thickness can be an early sign of Down syndrome. ? Chorionic villus sampling (CVS). This is a test that looks for certain genetic problems with your baby. The same genes that are in your baby are in the placenta. A small piece of the placenta is taken out and tested. This test is done when you are 10 to 13 weeks pregnant. Ease discomfort  · Slow down and take naps when you feel tired. · If your emotions swing, talk to someone. Crying, anxiety, and concentration problems are common. · If your gums bleed, try a softer toothbrush. If your gums are puffy and bleed a lot, see your dentist.  · If you feel dizzy:  ? Get up slowly after sitting or lying down. ? Drink plenty of fluids. ? Eat small snacks to keep your blood sugar stable. ? Put your head between your legs as though you were tying your shoelaces. ? Lie down with your legs higher than your head. Use pillows to prop up your feet. · If you have a headache:  ? Lie down. ? Ask your partner or a good friend for a neck massage. ? Try cool cloths over your forehead or across the back of your neck. ? Use acetaminophen (Tylenol) for pain relief. Do not use nonsteroidal anti-inflammatory drugs (NSAIDs), such as ibuprofen (Advil, Motrin) or naproxen (Aleve), unless your doctor says it is okay. · If you have a nosebleed, pinch your nose gently, and hold it for a short while. To prevent nosebleeds, try massaging a small dab of petroleum jelly, such as Vaseline, in your nostrils. · If your nose is stuffed up, try saline (saltwater) nose sprays. Do not use decongestant sprays. Care for your breasts  · Wear a bra that gives you good support. · Know that changes in your breasts are normal.  ? Your breasts may get larger and more tender. Tenderness usually gets better by 12 weeks. ? Your nipples may get darker and larger, and small bumps around your nipples may show more. ?  The veins in your chest and breasts may show more. · Don't worry about \"toughening'\" your nipples. Breastfeeding will naturally do this. Where can you learn more? Go to http://www.gray.com/  Enter I826 in the search box to learn more about \"Weeks 10 to 14 of Your Pregnancy: Care Instructions. \"  Current as of: February 11, 2020               Content Version: 12.6  © 8766-2388 [employer]. Care instructions adapted under license by [employer] (which disclaims liability or warranty for this information). If you have questions about a medical condition or this instruction, always ask your healthcare professional. [address] [place] [address] any warranty or liability for your use of this information.

## 2021-01-13 LAB
ABO + RH BLD: NORMAL
BLOOD BANK CMNT PATIENT-IMP: NORMAL
BLOOD GROUP ANTIBODIES SERPL: NORMAL
ERYTHROCYTE [DISTWIDTH] IN BLOOD BY AUTOMATED COUNT: 15.7 % (ref 11.5–14.5)
HBV SURFACE AG SER QL: <0.1 INDEX
HBV SURFACE AG SER QL: NEGATIVE
HCT VFR BLD AUTO: 34 % (ref 35–47)
HGB BLD-MCNC: 10.5 G/DL (ref 11.5–16)
HIV 1+2 AB+HIV1 P24 AG SERPL QL IA: NONREACTIVE
HIV12 RESULT COMMENT, HHIVC: NORMAL
MCH RBC QN AUTO: 26.9 PG (ref 26–34)
MCHC RBC AUTO-ENTMCNC: 30.9 G/DL (ref 30–36.5)
MCV RBC AUTO: 87.2 FL (ref 80–99)
NRBC # BLD: 0 K/UL (ref 0–0.01)
NRBC BLD-RTO: 0 PER 100 WBC
PLATELET # BLD AUTO: 239 K/UL (ref 150–400)
PMV BLD AUTO: 11.5 FL (ref 8.9–12.9)
RBC # BLD AUTO: 3.9 M/UL (ref 3.8–5.2)
RPR SER QL: NONREACTIVE
RUBV IGG SER-IMP: REACTIVE
RUBV IGG SERPL IA-ACNC: 121 IU/ML
SPECIMEN EXP DATE BLD: NORMAL
WBC # BLD AUTO: 5 K/UL (ref 3.6–11)

## 2021-01-14 LAB
DEPRECATED HGB OTHER BLD-IMP: 0 %
HGB A MFR BLD: 97.9 % (ref 96.4–98.8)
HGB A2 MFR BLD COLUMN CHROM: 2.1 % (ref 1.8–3.2)
HGB C MFR BLD: 0 %
HGB F MFR BLD: 0 % (ref 0–2)
HGB FRACT BLD-IMP: NORMAL
HGB S BLD QL SOLY: NEGATIVE
HGB S MFR BLD: 0 %

## 2021-01-26 ENCOUNTER — TELEPHONE (OUTPATIENT)
Dept: OBGYN CLINIC | Age: 26
End: 2021-01-26

## 2021-01-26 RX ORDER — ONDANSETRON 8 MG/1
8 TABLET, ORALLY DISINTEGRATING ORAL
Qty: 30 TAB | Refills: 2 | Status: SHIPPED | OUTPATIENT
Start: 2021-01-26

## 2021-01-26 NOTE — TELEPHONE ENCOUNTER
Message left at 810am    22year old patient last seen in the office on 2021    Patient is  14w0d pregnant patient    Patient left a message to say that she is having nausea and vomiting about 4 times a day and is wondering if she could have something to help her with that    This nurse called the patient back      Patient reports having nausea for the past week. Patient is struggling with keeping any thing down.     Patient reports she is voiding and denies vaginal bleeding and has mild cramping      Please advise    Pharmacy confirmed

## 2021-02-09 ENCOUNTER — ROUTINE PRENATAL (OUTPATIENT)
Dept: OBGYN CLINIC | Age: 26
End: 2021-02-09
Payer: MEDICAID

## 2021-02-09 VITALS
DIASTOLIC BLOOD PRESSURE: 70 MMHG | WEIGHT: 194 LBS | SYSTOLIC BLOOD PRESSURE: 114 MMHG | BODY MASS INDEX: 33.12 KG/M2 | HEIGHT: 64 IN

## 2021-02-09 DIAGNOSIS — Z34.81 ENCOUNTER FOR SUPERVISION OF OTHER NORMAL PREGNANCY, FIRST TRIMESTER: ICD-10-CM

## 2021-02-09 DIAGNOSIS — Z34.82 ENCOUNTER FOR SUPERVISION OF OTHER NORMAL PREGNANCY IN SECOND TRIMESTER: Primary | ICD-10-CM

## 2021-02-09 LAB — AFP, MATERNAL, EXTERNAL: NEGATIVE

## 2021-02-09 PROCEDURE — 0502F SUBSEQUENT PRENATAL CARE: CPT | Performed by: OBSTETRICS & GYNECOLOGY

## 2021-02-09 NOTE — PATIENT INSTRUCTIONS

## 2021-02-11 LAB
AFP ADJ MOM SERPL: 0.9
AFP INTERP SERPL-IMP: NORMAL
AFP INTERP SERPL-IMP: NORMAL
AFP SERPL-MCNC: 27.9 NG/ML
AGE AT DELIVERY: 26 YR
COMMENT, 018013: NORMAL
GA METHOD: NORMAL
GA: 16 WEEKS
IDDM PATIENT QL: NO
MULTIPLE PREGNANCY: NO
NEURAL TUBE DEFECT RISK FETUS: NORMAL %
RESULTS, 017004: NORMAL

## 2021-05-24 ENCOUNTER — HOSPITAL ENCOUNTER (INPATIENT)
Age: 26
LOS: 2 days | Discharge: PSYCHIATRIC OR SUBSTANCE USE FACILITY | DRG: 812 | End: 2021-05-26
Attending: EMERGENCY MEDICINE | Admitting: FAMILY MEDICINE
Payer: MEDICAID

## 2021-05-24 DIAGNOSIS — T50.904A DRUG OVERDOSE, UNDETERMINED INTENT, INITIAL ENCOUNTER: Primary | ICD-10-CM

## 2021-05-24 DIAGNOSIS — T65.92XA SUICIDAL DELIBERATE POISONING, INITIAL ENCOUNTER (HCC): ICD-10-CM

## 2021-05-24 PROBLEM — T50.901A DRUG OVERDOSE: Status: ACTIVE | Noted: 2021-05-24

## 2021-05-24 LAB
ALBUMIN SERPL-MCNC: 3.5 G/DL (ref 3.5–5)
ALBUMIN/GLOB SERPL: 0.9 {RATIO} (ref 1.1–2.2)
ALP SERPL-CCNC: 72 U/L (ref 45–117)
ALT SERPL-CCNC: 17 U/L (ref 12–78)
ANION GAP SERPL CALC-SCNC: 6 MMOL/L (ref 5–15)
APAP SERPL-MCNC: <10 UG/ML (ref 10–30)
AST SERPL W P-5'-P-CCNC: 11 U/L (ref 15–37)
BILIRUB SERPL-MCNC: 0.1 MG/DL (ref 0.2–1)
BUN SERPL-MCNC: 7 MG/DL (ref 6–20)
BUN/CREAT SERPL: 11 (ref 12–20)
CA-I BLD-MCNC: 8.8 MG/DL (ref 8.5–10.1)
CHLORIDE SERPL-SCNC: 110 MMOL/L (ref 97–108)
CK SERPL-CCNC: 97 U/L (ref 26–192)
CO2 SERPL-SCNC: 25 MMOL/L (ref 21–32)
CREAT SERPL-MCNC: 0.61 MG/DL (ref 0.55–1.02)
GLOBULIN SER CALC-MCNC: 3.9 G/DL (ref 2–4)
GLUCOSE SERPL-MCNC: 79 MG/DL (ref 65–100)
POTASSIUM SERPL-SCNC: 4.3 MMOL/L (ref 3.5–5.1)
PROT SERPL-MCNC: 7.4 G/DL (ref 6.4–8.2)
SALICYLATES SERPL-MCNC: 2.5 MG/DL (ref 2.8–20)
SARS-COV-2, COV2: NORMAL
SODIUM SERPL-SCNC: 141 MMOL/L (ref 136–145)

## 2021-05-24 PROCEDURE — 80143 DRUG ASSAY ACETAMINOPHEN: CPT

## 2021-05-24 PROCEDURE — 36415 COLL VENOUS BLD VENIPUNCTURE: CPT

## 2021-05-24 PROCEDURE — 85025 COMPLETE CBC W/AUTO DIFF WBC: CPT

## 2021-05-24 PROCEDURE — 83605 ASSAY OF LACTIC ACID: CPT

## 2021-05-24 PROCEDURE — 81025 URINE PREGNANCY TEST: CPT

## 2021-05-24 PROCEDURE — 65270000029 HC RM PRIVATE

## 2021-05-24 PROCEDURE — 99285 EMERGENCY DEPT VISIT HI MDM: CPT

## 2021-05-24 PROCEDURE — 82550 ASSAY OF CK (CPK): CPT

## 2021-05-24 PROCEDURE — 80053 COMPREHEN METABOLIC PANEL: CPT

## 2021-05-24 PROCEDURE — 87086 URINE CULTURE/COLONY COUNT: CPT

## 2021-05-24 PROCEDURE — 81001 URINALYSIS AUTO W/SCOPE: CPT

## 2021-05-24 PROCEDURE — 87635 SARS-COV-2 COVID-19 AMP PRB: CPT

## 2021-05-24 PROCEDURE — 93005 ELECTROCARDIOGRAM TRACING: CPT

## 2021-05-24 PROCEDURE — 80179 DRUG ASSAY SALICYLATE: CPT

## 2021-05-24 RX ORDER — ONDANSETRON 2 MG/ML
4 INJECTION INTRAMUSCULAR; INTRAVENOUS
Status: DISCONTINUED | OUTPATIENT
Start: 2021-05-24 | End: 2021-05-26 | Stop reason: HOSPADM

## 2021-05-25 PROBLEM — T50.902A DRUG OVERDOSE, INTENTIONAL (HCC): Status: ACTIVE | Noted: 2021-05-25

## 2021-05-25 LAB
APPEARANCE UR: CLEAR
ATRIAL RATE: 73 BPM
BACTERIA URNS QL MICRO: NEGATIVE /HPF
BASOPHILS # BLD: 0 K/UL (ref 0–0.1)
BASOPHILS NFR BLD: 1 % (ref 0–1)
BILIRUB UR QL: NEGATIVE
CALCULATED P AXIS, ECG09: 41 DEGREES
CALCULATED R AXIS, ECG10: 59 DEGREES
CALCULATED T AXIS, ECG11: 43 DEGREES
COLOR UR: ABNORMAL
DIAGNOSIS, 93000: NORMAL
DIFFERENTIAL METHOD BLD: ABNORMAL
EOSINOPHIL # BLD: 0 K/UL (ref 0–0.4)
EOSINOPHIL NFR BLD: 0 % (ref 0–7)
ERYTHROCYTE [DISTWIDTH] IN BLOOD BY AUTOMATED COUNT: 16.2 % (ref 11.5–14.5)
GLUCOSE UR STRIP.AUTO-MCNC: NEGATIVE MG/DL
HCG UR QL: NEGATIVE
HCT VFR BLD AUTO: 37 % (ref 35–47)
HGB BLD-MCNC: 11.5 G/DL (ref 11.5–16)
HGB UR QL STRIP: NEGATIVE
IMM GRANULOCYTES # BLD AUTO: 0 K/UL (ref 0–0.04)
IMM GRANULOCYTES NFR BLD AUTO: 0 % (ref 0–0.5)
KETONES UR QL STRIP.AUTO: NEGATIVE MG/DL
LACTATE SERPL-SCNC: 2 MMOL/L (ref 0.4–2)
LEUKOCYTE ESTERASE UR QL STRIP.AUTO: NEGATIVE
LYMPHOCYTES # BLD: 1.2 K/UL (ref 0.8–3.5)
LYMPHOCYTES NFR BLD: 18 % (ref 12–49)
MCH RBC QN AUTO: 25.8 PG (ref 26–34)
MCHC RBC AUTO-ENTMCNC: 31.1 G/DL (ref 30–36.5)
MCV RBC AUTO: 83 FL (ref 80–99)
MONOCYTES # BLD: 0.3 K/UL (ref 0–1)
MONOCYTES NFR BLD: 5 % (ref 5–13)
NEUTS SEG # BLD: 5.1 K/UL (ref 1.8–8)
NEUTS SEG NFR BLD: 76 % (ref 32–75)
NITRITE UR QL STRIP.AUTO: NEGATIVE
NRBC # BLD: 0 K/UL (ref 0–0.01)
NRBC BLD-RTO: 0 PER 100 WBC
P-R INTERVAL, ECG05: 150 MS
PH UR STRIP: 6 [PH] (ref 5–8)
PLATELET # BLD AUTO: 302 K/UL (ref 150–400)
PMV BLD AUTO: 12.2 FL (ref 8.9–12.9)
PROT UR STRIP-MCNC: NEGATIVE MG/DL
Q-T INTERVAL, ECG07: 378 MS
QRS DURATION, ECG06: 82 MS
QTC CALCULATION (BEZET), ECG08: 416 MS
RBC # BLD AUTO: 4.46 M/UL (ref 3.8–5.2)
RBC #/AREA URNS HPF: ABNORMAL /HPF (ref 0–5)
SARS-COV-2, COV2: NOT DETECTED
SP GR UR REFRACTOMETRY: 1.02 (ref 1–1.03)
UA: UC IF INDICATED,UAUC: ABNORMAL
UROBILINOGEN UR QL STRIP.AUTO: 0.1 EU/DL (ref 0.1–1)
VENTRICULAR RATE, ECG03: 73 BPM
WBC # BLD AUTO: 6.7 K/UL (ref 3.6–11)
WBC URNS QL MICRO: ABNORMAL /HPF (ref 0–4)

## 2021-05-25 PROCEDURE — 65270000029 HC RM PRIVATE

## 2021-05-25 PROCEDURE — 74011000250 HC RX REV CODE- 250: Performed by: EMERGENCY MEDICINE

## 2021-05-25 PROCEDURE — 94640 AIRWAY INHALATION TREATMENT: CPT

## 2021-05-25 PROCEDURE — 74011250637 HC RX REV CODE- 250/637: Performed by: FAMILY MEDICINE

## 2021-05-25 PROCEDURE — 74011250636 HC RX REV CODE- 250/636: Performed by: FAMILY MEDICINE

## 2021-05-25 RX ORDER — PANTOPRAZOLE SODIUM 40 MG/1
40 TABLET, DELAYED RELEASE ORAL DAILY
Status: DISCONTINUED | OUTPATIENT
Start: 2021-05-25 | End: 2021-05-26 | Stop reason: HOSPADM

## 2021-05-25 RX ORDER — POLYETHYLENE GLYCOL 3350 17 G/17G
17 POWDER, FOR SOLUTION ORAL DAILY PRN
Status: DISCONTINUED | OUTPATIENT
Start: 2021-05-25 | End: 2021-05-26 | Stop reason: HOSPADM

## 2021-05-25 RX ORDER — ONDANSETRON 4 MG/1
8 TABLET, ORALLY DISINTEGRATING ORAL
Status: DISCONTINUED | OUTPATIENT
Start: 2021-05-25 | End: 2021-05-26 | Stop reason: HOSPADM

## 2021-05-25 RX ORDER — ESCITALOPRAM OXALATE 10 MG/1
10 TABLET ORAL DAILY
Status: DISCONTINUED | OUTPATIENT
Start: 2021-05-25 | End: 2021-05-26 | Stop reason: HOSPADM

## 2021-05-25 RX ORDER — ACETAMINOPHEN 325 MG/1
650 TABLET ORAL
Status: DISCONTINUED | OUTPATIENT
Start: 2021-05-25 | End: 2021-05-26 | Stop reason: HOSPADM

## 2021-05-25 RX ORDER — ALBUTEROL SULFATE 90 UG/1
2 AEROSOL, METERED RESPIRATORY (INHALATION)
COMMUNITY
End: 2021-06-02

## 2021-05-25 RX ORDER — IPRATROPIUM BROMIDE AND ALBUTEROL SULFATE 2.5; .5 MG/3ML; MG/3ML
3 SOLUTION RESPIRATORY (INHALATION)
Status: COMPLETED | OUTPATIENT
Start: 2021-05-25 | End: 2021-05-25

## 2021-05-25 RX ORDER — ARIPIPRAZOLE 2 MG/1
5 TABLET ORAL DAILY
Status: DISCONTINUED | OUTPATIENT
Start: 2021-05-26 | End: 2021-05-26 | Stop reason: HOSPADM

## 2021-05-25 RX ORDER — LEVETIRACETAM 500 MG/1
500 TABLET ORAL 2 TIMES DAILY
Status: DISCONTINUED | OUTPATIENT
Start: 2021-05-25 | End: 2021-05-26 | Stop reason: HOSPADM

## 2021-05-25 RX ORDER — ONDANSETRON 2 MG/ML
4 INJECTION INTRAMUSCULAR; INTRAVENOUS
Status: DISCONTINUED | OUTPATIENT
Start: 2021-05-25 | End: 2021-05-25

## 2021-05-25 RX ORDER — SODIUM CHLORIDE 9 MG/ML
75 INJECTION, SOLUTION INTRAVENOUS CONTINUOUS
Status: DISCONTINUED | OUTPATIENT
Start: 2021-05-25 | End: 2021-05-26 | Stop reason: HOSPADM

## 2021-05-25 RX ORDER — ACETAMINOPHEN 650 MG/1
650 SUPPOSITORY RECTAL
Status: DISCONTINUED | OUTPATIENT
Start: 2021-05-25 | End: 2021-05-26 | Stop reason: HOSPADM

## 2021-05-25 RX ORDER — ONDANSETRON 4 MG/1
4 TABLET, ORALLY DISINTEGRATING ORAL
Status: DISCONTINUED | OUTPATIENT
Start: 2021-05-25 | End: 2021-05-25

## 2021-05-25 RX ORDER — LACOSAMIDE 50 MG/1
50 TABLET ORAL EVERY 12 HOURS
Status: DISCONTINUED | OUTPATIENT
Start: 2021-05-25 | End: 2021-05-26 | Stop reason: HOSPADM

## 2021-05-25 RX ORDER — ALBUTEROL SULFATE 90 UG/1
2 AEROSOL, METERED RESPIRATORY (INHALATION)
Status: DISCONTINUED | OUTPATIENT
Start: 2021-05-25 | End: 2021-05-26 | Stop reason: HOSPADM

## 2021-05-25 RX ADMIN — SODIUM CHLORIDE 75 ML/HR: 9 INJECTION, SOLUTION INTRAVENOUS at 13:34

## 2021-05-25 RX ADMIN — ESCITALOPRAM OXALATE 10 MG: 10 TABLET ORAL at 13:41

## 2021-05-25 RX ADMIN — LEVETIRACETAM 500 MG: 500 TABLET ORAL at 23:19

## 2021-05-25 RX ADMIN — IPRATROPIUM BROMIDE AND ALBUTEROL SULFATE 3 ML: .5; 3 SOLUTION RESPIRATORY (INHALATION) at 06:50

## 2021-05-25 RX ADMIN — ALBUTEROL SULFATE 2 PUFF: 108 AEROSOL, METERED RESPIRATORY (INHALATION) at 20:54

## 2021-05-25 RX ADMIN — PANTOPRAZOLE SODIUM 40 MG: 40 TABLET, DELAYED RELEASE ORAL at 13:42

## 2021-05-25 RX ADMIN — LACOSAMIDE 50 MG: 50 TABLET, FILM COATED ORAL at 13:42

## 2021-05-25 NOTE — ED NOTES
Per Ingrid Nix with [facility], with vitals and labs all WNL she can be cleared once she returns to baseline with alertness and appetite.  At this time patient resting with eyes closed

## 2021-05-25 NOTE — ED NOTES
Pt A&Ox4 asking for food. Tolerating PO intake. [employer] updated, pt meeting criteria and is cleared by [facility].

## 2021-05-25 NOTE — ED NOTES
Bedside and Verbal shift change report given to Hosea Faustin RN (oncoming nurse) by Yamila Card RN (offgoing nurse).  Report included the following information SBAR, ED Summary, Intake/Output, MAR, Recent Results and Cardiac Rhythm NSR. '

## 2021-05-25 NOTE — ED NOTES
Per Nacho Davies with Poison Control, Pt can be medically cleared without monitoring pending lab results due to ingestion time being at 14:30.  Will update admitting hospitalist

## 2021-05-25 NOTE — H&P
History and Physical    NAME: Duane Dia   :  1995   MRN:  [id number]     Date/Time:  2021 12:08 PM    Patient PCP: Adrienne Claros MD  ______________________________________________________________________             Subjective:     CHIEF COMPLAINT:     Drug overdose severe depression and suicide    HISTORY OF PRESENT ILLNESS:       Patient is a 22y.o. year old female with signal past medical history of borderline personality disorder severe depression history of seizure came to the ER due to suicidal attempt yesterday she lost custody of the child and was severe sleep depressed and want to kill herself took 20 pills of trazodone sleeping in the car police woke her up she started vomiting seen by the ER physician and patient was admitted with drug overdose suicidal ideation and attempt and severe depression    Past Medical History:   Diagnosis Date    Anemia 2019    Asthma     [facility] [address] [address]     Seizures ([facility] [address]     last seizure two weeks ago        Past Surgical History:   Procedure Laterality Date    HX OTHER SURGICAL Left     Left eye cyst removal       Social History     Tobacco Use    Smoking status: Current Every Day Smoker     Packs/day: 5.00     Years: 2.00     Pack years: 10.00     Types: Cigarettes    Smokeless tobacco: Never Used   Substance Use Topics    Alcohol use: Yes        History reviewed. No pertinent family history. Allergies   Allergen Reactions    Citrus And Derivatives Hives    Lamictal [Lamotrigine] Nausea and Vomiting        Prior to Admission medications    Medication Sig Start Date End Date Taking? Authorizing Provider   albuterol (PROVENTIL HFA, VENTOLIN HFA, PROAIR HFA) 90 mcg/actuation inhaler Take 2 Puffs by inhalation every six (6) hours as needed for Wheezing.    Yes Other, MD Jose Raul   lacosamide (Vimpat) 50 mg tab tablet Vimpat 50 mg tablet   TK 1 T PO BID 19  Yes Provider, Historical   levETIRAcetam (KEPPRA) 500 mg tablet Take 500 mg by mouth two (2) times a day. Indications: TONIC-CLONIC EPILEPSY TREATMENT ADJUNCT   Yes Provider, Historical   venlafaxine-SR (EFFEXOR-XR) 150 mg capsule venlafaxine  mg capsule,extended release 24 hr   TK 1 C PO QD  Patient not taking: Reported on 5/25/2021    Provider, Historical   escitalopram oxalate (LEXAPRO) 10 mg tablet  3/3/21   Provider, Historical   ondansetron (ZOFRAN ODT) 8 mg disintegrating tablet Take 1 Tab by mouth every eight (8) hours as needed for Nausea. 1/26/21   Rossy Mccarthy MD   omeprazole (PRILOSEC) 20 mg capsule omeprazole 20 mg capsule,delayed release  Patient not taking: Reported on 5/25/2021    Provider, Historical   ARIPiprazole (Abilify) 5 mg tablet Take  by mouth daily. Provider, Historical   ibuprofen (MOTRIN) 800 mg tablet Take 1 Tab by mouth every eight (8) hours. Patient not taking: Reported on 5/25/2021 8/31/16   Marcelino Bone MD   prenatal vit-calcium-iron-fa (PRENATAL PLUS, CALCIUM CARB,) 27 mg iron- 1 mg tab Take 1 Tab by mouth daily.  Indications: PREGNANCY  Patient not taking: Reported on 5/25/2021    Provider, Historical         Current Facility-Administered Medications:     ondansetron (ZOFRAN ODT) tablet 8 mg, 8 mg, Oral, Q8H PRN, Nilda Boyd MD    albuterol (PROVENTIL HFA, VENTOLIN HFA, PROAIR HFA) inhaler 2 Puff, 2 Puff, Inhalation, Q6H PRN, Wilver Boyd MD    [START ON 5/26/2021] ARIPiprazole (ABILIFY) tablet 5 mg, 5 mg, Oral, DAILY, Nilda Boyd MD Duana Hark  [START ON 5/26/2021] escitalopram oxalate (LEXAPRO) tablet 10 mg, 10 mg, Oral, DAILY, Nilda Boyd MD    lacosamide (VIMPAT) tablet 50 mg, 50 mg, Oral, Q12H, Wilver Boyd MD    levETIRAcetam (KEPPRA) tablet 500 mg, 500 mg, Oral, BID, Wilver Boyd MD Duana Hark  [START ON 5/26/2021] omeprazole (PRILOSEC) capsule 40 mg, 40 mg, Oral, DAILY, Wilver Boyd MD    [START ON 5/26/2021] prenatal vit-calcium-iron-fa (PRENATAL PLUS with CALCIUM) tablet 1 Tablet, 1 Tablet, Oral, Karlos Calixot MD    0.9% sodium chloride infusion, 75 mL/hr, IntraVENous, CONTINUOUS, Demian Boyd MD    acetaminophen (TYLENOL) tablet 650 mg, 650 mg, Oral, Q6H PRN **OR** acetaminophen (TYLENOL) suppository 650 mg, 650 mg, Rectal, Q6H PRN, Demian Boyd MD    polyethylene glycol (MIRALAX) packet 17 g, 17 g, Oral, DAILY PRN, Demian Boyd MD    ondansetron (ZOFRAN ODT) tablet 4 mg, 4 mg, Oral, Q8H PRN **OR** ondansetron (ZOFRAN) injection 4 mg, 4 mg, IntraVENous, Q6H PRN, Demian Boyd MD    ondansetron [facility]) injection 4 mg, 4 mg, IntraVENous, Q4H PRN, Wilver Boyd MD    LAB DATA REVIEWED:    Recent Results (from the past 24 hour(s))   URINALYSIS W/ REFLEX CULTURE    Collection Time: 05/24/21  9:30 PM    Specimen: Urine   Result Value Ref Range    Color Yellow/Straw      Appearance Clear Clear      Specific gravity 1.017 1.003 - 1.030      pH (UA) 6.0 5.0 - 8.0      Protein Negative Negative mg/dL    Glucose Negative Negative mg/dL    Ketone Negative Negative mg/dL    Bilirubin Negative Negative      Blood Negative Negative      Urobilinogen 0.1 0.1 - 1.0 EU/dL    Nitrites Negative Negative      Leukocyte Esterase Negative Negative      UA:UC IF INDICATED Urine Culture Ordered (A) Culture not indicated by UA result      WBC 0-5 0 - 4 /hpf    RBC 0-5 0 - 5 /hpf    Bacteria Negative Negative /hpf   HCG URINE, QL    Collection Time: 05/24/21  9:30 PM   Result Value Ref Range    HCG urine, QL Negative Negative     EKG, 12 LEAD, INITIAL    Collection Time: 05/24/21  9:31 PM   Result Value Ref Range    Ventricular Rate 73 BPM    Atrial Rate 73 BPM    P-R Interval 150 ms    QRS Duration 82 ms    Q-T Interval 378 ms    QTC Calculation (Bezet) 416 ms    Calculated P Axis 41 degrees    Calculated R Axis 59 degrees    Calculated T Axis 43 degrees    Diagnosis       Normal sinus rhythm  Septal infarct , age undetermined  Abnormal ECG  When compared with ECG of 23-MAR-2020 11:44,  Septal infarct is now Present  Confirmed by Renata LUNA MD () on 5/25/2021 10:09:50 AM     CBC WITH AUTOMATED DIFF    Collection Time: 05/24/21 10:00 PM   Result Value Ref Range    WBC 6.7 3.6 - 11.0 K/uL    RBC 4.46 3.80 - 5.20 M/uL    HGB 11.5 11.5 - 16.0 g/dL    HCT 37.0 35.0 - 47.0 %    MCV 83.0 80.0 - 99.0 FL    MCH 25.8 (L) 26.0 - 34.0 PG    MCHC 31.1 30.0 - 36.5 g/dL    RDW 16.2 (H) 11.5 - 14.5 %    PLATELET 522 068 - 474 K/uL    MPV 12.2 8.9 - 12.9 FL    NRBC 0.0 0.0  WBC    ABSOLUTE NRBC 0.00 0.00 - 0.01 K/uL    NEUTROPHILS 76 (H) 32 - 75 %    LYMPHOCYTES 18 12 - 49 %    MONOCYTES 5 5 - 13 %    EOSINOPHILS 0 0 - 7 %    BASOPHILS 1 0 - 1 %    IMMATURE GRANULOCYTES 0 0 - 0.5 %    ABS. NEUTROPHILS 5.1 1.8 - 8.0 K/UL    ABS. LYMPHOCYTES 1.2 0.8 - 3.5 K/UL    ABS. MONOCYTES 0.3 0.0 - 1.0 K/UL    ABS. EOSINOPHILS 0.0 0.0 - 0.4 K/UL    ABS. BASOPHILS 0.0 0.0 - 0.1 K/UL    ABS. IMM. GRANS. 0.0 0.00 - 0.04 K/UL    DF AUTOMATED     ACETAMINOPHEN    Collection Time: 05/24/21 10:00 PM   Result Value Ref Range    Acetaminophen level <10 (L) 10 - 30 ug/mL   SALICYLATE    Collection Time: 05/24/21 10:00 PM   Result Value Ref Range    Salicylate level 2.5 (L) 2.8 - 72.5 mg/dL   METABOLIC PANEL, COMPREHENSIVE    Collection Time: 05/24/21 10:00 PM   Result Value Ref Range    Sodium 141 136 - 145 mmol/L    Potassium 4.3 3.5 - 5.1 mmol/L    Chloride 110 (H) 97 - 108 mmol/L    CO2 25 21 - 32 mmol/L    Anion gap 6 5 - 15 mmol/L    Glucose 79 65 - 100 mg/dL    BUN 7 6 - 20 mg/dL    Creatinine 0.61 0.55 - 1.02 mg/dL    BUN/Creatinine ratio 11 (L) 12 - 20      GFR est AA >60 >60 ml/min/1.73m2    GFR est non-AA >60 >60 ml/min/1.73m2    Calcium 8.8 8.5 - 10.1 mg/dL    Bilirubin, total 0.1 (L) 0.2 - 1.0 mg/dL    AST (SGOT) 11 (L) 15 - 37 U/L    ALT (SGPT) 17 12 - 78 U/L    Alk.  phosphatase 72 45 - 117 U/L    Protein, total 7.4 6.4 - 8.2 g/dL    Albumin 3.5 3.5 - 5.0 g/dL    Globulin 3.9 2.0 - 4.0 g/dL    A-G Ratio 0.9 (L) 1.1 - 2.2     CK    Collection Time: 05/24/21 10:00 PM   Result Value Ref Range    CK 97 26 - 192 U/L   LACTIC ACID    Collection Time: 05/24/21 10:00 PM   Result Value Ref Range    Lactic acid 2.0 0.4 - 2.0 mmol/L   SARS-COV-2    Collection Time: 05/24/21 10:30 PM   Result Value Ref Range    SARS-CoV-2 Please find results under separate order     SARS-COV-2    Collection Time: 05/24/21 10:30 PM   Result Value Ref Range    SARS-CoV-2 Not Detected Not Detected         XR Results (most recent):  No results found for this or any previous visit. No orders to display        Review of Systems:  Constitutional: Negative for chills and fever. HENT: Negative. Eyes: Negative. Respiratory: Negative. Cardiovascular: Negative. Gastrointestinal: Negative for abdominal pain and nausea. Skin: Negative. Neurological: Negative. Objective:   VITALS:    Visit Vitals  /81 (BP 1 Location: Left upper arm, BP Patient Position: Sitting)   Pulse 80   Temp 97.9 °F (36.6 °C)   Resp 22   Ht 5' 4\" (1.626 m)   Wt 84.4 kg (186 lb)   SpO2 99%   BMI 31.93 kg/m²       Physical Exam:   Constitutional: pt is oriented to person, place, and time. HENT:   Head: Normocephalic and atraumatic. Eyes: Pupils are equal, round, and reactive to light. EOM are normal.   Cardiovascular: Normal rate, regular rhythm and normal heart sounds. Pulmonary/Chest: Breath sounds normal. No wheezes. No rales. Exhibits no tenderness. Abdominal: Soft. Bowel sounds are normal. There is no abdominal tenderness. There is no rebound and no guarding. Musculoskeletal: Normal range of motion. Neurological: pt is alert and oriented to person, place, and time. Alert. Normal strength. No cranial nerve deficit or sensory deficit. Displays a negative Romberg sign.         ASSESSMENT & PLAN:    Drug overdose with trazodone  Suicidal attempt  Severe depression  Borderline personality disorder  Seizure disorder      Patient placed one-to-one  Psychiatry consult  IV fluids  Continue home medicine including Abilify Lexapro Vimpat Keppra Prilosec and vitamin            ________________________________________________________________________    Signed: Alexia Reece MD

## 2021-05-25 NOTE — ROUTINE PROCESS
TRANSFER - OUT REPORT:    Verbal report given to [facility], RN(name) on June Harness  being transferred to [facility](unit) for routine progression of care       Report consisted of patients Situation, Background, Assessment and   Recommendations(SBAR). Information from the following report(s) ED Summary was reviewed with the receiving nurse. Lines:       Opportunity for questions and clarification was provided.       Patient transported with:   Registered Nurse

## 2021-05-25 NOTE — ED TRIAGE NOTES
14:30 OD of 3 gr lacosamide, 1gr trazodone, 450mg benadryl, Pt vomited after intake and found unconscious in her car.  Has SI

## 2021-05-25 NOTE — ED NOTES
Pt endorses Si after suicide atttempt today. Denies HI. Has has IP psychiatric treatment before in which D-19 was involved. She understands this will happen again if she refuses and so is voluntary for admission at this time. Pt is drowsy, oriented x4. ED provider notified that doc to doc will be needed pending medical clearance without BHI tonight.

## 2021-05-25 NOTE — ED NOTES
Dr. Bandar Barkley called this am to inform him of Poison Control clearing pt at 0500 this am.  MD still wants to admit pt medical with 1:1

## 2021-05-25 NOTE — ED PROVIDER NOTES
EMERGENCY DEPARTMENT HISTORY AND PHYSICAL EXAM      Date: 5/24/2021  Patient Name: Rickie Schirmer      History of Presenting Illness     Chief Complaint   Patient presents with   [address] Problem       History Provided By: Patient    HPI: Rickie Schirmer, 22 y.o. female with a past medical history significant Personality disorder with prior suicide attempt as an overdose presents to the ED with cc of having overdosed on trazodone, Benadryl and Vimpat. Patient states she did not a [employer] parking lot and attempt to kill herself. She says she was asleep in the car when police woke her up and she began vomiting. She was not treated with anything prior to arrival.  She is voluntary at this point. She denies any fever, chills, chest pain, shortness of breath, rash, diarrhea, headache, night sweats. There are no other complaints, changes, or physical findings at this time. PCP: Tess Schilder, MD    Current Outpatient Medications   Medication Sig Dispense Refill    venlafaxine-SR [facility] 150 mg capsule venlafaxine  mg capsule,extended release 24 hr   TK 1 C PO QD      escitalopram oxalate (LEXAPRO) 10 mg tablet       ondansetron (ZOFRAN ODT) 8 mg disintegrating tablet Take 1 Tab by mouth every eight (8) hours as needed for Nausea. 30 Tab 2    omeprazole (PRILOSEC) 20 mg capsule omeprazole 20 mg capsule,delayed release      lacosamide (Vimpat) 50 mg tab tablet Vimpat 50 mg tablet   TK 1 T PO BID      ARIPiprazole (Abilify) 5 mg tablet Take  by mouth daily.  ibuprofen (MOTRIN) 800 mg tablet Take 1 Tab by mouth every eight (8) hours. 30 Tab 0    prenatal vit-calcium-iron-fa (PRENATAL PLUS, CALCIUM CARB,) 27 mg iron- 1 mg tab Take 1 Tab by mouth daily. Indications: PREGNANCY      levETIRAcetam (KEPPRA) 500 mg tablet Take 500 mg by mouth two (2) times a day.  Indications: TONIC-CLONIC EPILEPSY TREATMENT ADJUNCT         Past History     Past Medical History:  Past Medical History: Diagnosis Date    Anemia 11/13/2019    Asthma     [facility] [address]     Seizures ([facility] [address]     last seizure two weeks ago       Past Surgical History:  Past Surgical History:   Procedure Laterality Date    HX OTHER SURGICAL Left 2008    Left eye cyst removal       Family History:  History reviewed. No pertinent family history. Social History:  Social History     Tobacco Use    Smoking status: Current Every Day Smoker     Packs/day: 5.00     Years: 2.00     Pack years: 10.00     Types: Cigarettes    Smokeless tobacco: Never Used   Substance Use Topics    Alcohol use: Yes    Drug use: No       Allergies: Allergies   Allergen Reactions    Citrus And Derivatives Hives    Lamictal [Lamotrigine] Nausea and Vomiting         Review of Systems     Review of Systems   Constitutional: Negative. Negative for appetite change, chills, fatigue and fever. HENT: Negative. Negative for congestion and sinus pain. Eyes: Negative. Negative for pain and visual disturbance. Respiratory: Negative. Negative for chest tightness and shortness of breath. Cardiovascular: Negative. Negative for chest pain. Gastrointestinal: Negative. Negative for abdominal pain, diarrhea, nausea and vomiting. Genitourinary: Negative. Negative for difficulty urinating. No discharge   Musculoskeletal: Negative. Negative for arthralgias. Skin: Negative. Negative for rash. Neurological: Negative. Negative for weakness and headaches. Hematological: Negative. Psychiatric/Behavioral: Positive for suicidal ideas. Negative for agitation. The patient is not nervous/anxious. All other systems reviewed and are negative. Physical Exam     Physical Exam  Vitals and nursing note reviewed. Constitutional:       General: She is not in acute distress. Appearance: She is well-developed. HENT:      Head: Normocephalic and atraumatic.       Nose: Nose normal.      Mouth/Throat:      Mouth: Mucous membranes are moist. Pharynx: Oropharynx is clear. No oropharyngeal exudate. Eyes:      General:         Right eye: No discharge. Left eye: No discharge. Conjunctiva/sclera: Conjunctivae normal.      Pupils: Pupils are equal, round, and reactive to light. Cardiovascular:      Rate and Rhythm: Normal rate and regular rhythm. Chest Wall: PMI is not displaced. No thrill. Heart sounds: Normal heart sounds. No murmur heard. No friction rub. No gallop. Pulmonary:      Effort: Pulmonary effort is normal. No respiratory distress. Breath sounds: Normal breath sounds. No wheezing or rales. Chest:      Chest wall: No tenderness. Abdominal:      General: Bowel sounds are normal. There is no distension. Palpations: Abdomen is soft. There is no mass. Tenderness: There is no abdominal tenderness. There is no guarding or rebound. Musculoskeletal:         General: Normal range of motion. Cervical back: Normal range of motion and neck supple. Lymphadenopathy:      Cervical: No cervical adenopathy. Skin:     General: Skin is warm and dry. Capillary Refill: Capillary refill takes less than 2 seconds. Findings: No erythema or rash. Neurological:      Mental Status: She is alert and oriented to person, place, and time. Cranial Nerves: No cranial nerve deficit. Coordination: Coordination normal.   Psychiatric:         Mood and Affect: Mood normal.         Behavior: Behavior normal.         Lab and Diagnostic Study Results     Labs -   No results found for this or any previous visit (from the past 12 hour(s)). Radiologic Studies -   [unfilled]  CT Results  (Last 48 hours)    None        CXR Results  (Last 48 hours)    None          Medical Decision Making and ED Course   - I am the first and primary provider for this patient AND AM THE PRIMARY PROVIDER OF RECORD.     - I reviewed the vital signs, available nursing notes, past medical history, past surgical history, family history and social history. - Initial assessment performed. The patients presenting problems have been discussed, and the staff are in agreement with the care plan formulated and outlined with them. I have encouraged them to ask questions as they arise throughout their visit. Vital Signs-Reviewed the patient's vital signs. Patient Vitals for the past 12 hrs:   Temp Pulse Resp BP SpO2   05/24/21 2239 98.1 °F (36.7 °C) 78 24 103/88 99 %   05/24/21 2056 98 °F (36.7 °C) 84 18 133/89 99 %       EKG interpretation: (Preliminary): Performed at 2131, and read at 133  Ventricular rate 73 bpm, ME interval 150 ms, QRS duration 82 ms,  ms. Interpretation: Normal sinus rhythm, septal infarct age undetermined. Abnormal EKG    Records Reviewed: Nursing Notes and Old Medical Records    The patient presents with drug overdose with a differential diagnosis of Rivas ideations with attempt. Patient is hemodynamically stable. Have discussed with toxicology who conveys the patient should be watched for the next 8 hours after labs are back for medical clearance. ED Course:              Provider Notes (Medical Decision Making): MDM           Consultations:       Consultations: - NONE        Procedures and Critical Care         Disposition     Disposition: Condition stable      Diagnosis     Clinical Impression:   1. Drug overdose, undetermined intent, initial encounter    2. Suicidal deliberate poisoning, initial encounter [facility])        Attestations:    Peter Beck MD    Please note that this dictation was completed with [employer], the computer voice recognition software. Quite often unanticipated grammatical, syntax, homophones, and other interpretive errors are inadvertently transcribed by the computer software. Please disregard these errors. Please excuse any errors that have escaped final proofreading. Thank you.

## 2021-05-25 NOTE — ED NOTES
Pt in green gown  Room is psych safe  Belongings collected, labeled, stored in ED Station 2 dictation room    Inventory as follows:    Black t shirt  Jeans  Black shoes  chapstick  Vimpat bottle  Benadryl box  Trazodone bottle    Pt not found to have any weapons.   Inventory performed by Toi Montenegro RN and  confirmed with Clovis Sawant RN

## 2021-05-26 ENCOUNTER — HOSPITAL ENCOUNTER (INPATIENT)
Age: 26
LOS: 7 days | Discharge: HOME | DRG: 812 | End: 2021-06-02
Attending: PSYCHIATRY & NEUROLOGY | Admitting: PSYCHIATRY & NEUROLOGY
Payer: MEDICAID

## 2021-05-26 VITALS
BODY MASS INDEX: 31.76 KG/M2 | RESPIRATION RATE: 20 BRPM | HEART RATE: 65 BPM | TEMPERATURE: 98.4 F | SYSTOLIC BLOOD PRESSURE: 137 MMHG | WEIGHT: 186 LBS | OXYGEN SATURATION: 97 % | HEIGHT: 64 IN | DIASTOLIC BLOOD PRESSURE: 75 MMHG

## 2021-05-26 DIAGNOSIS — G40.909 SEIZURE DISORDER (HCC): Primary | ICD-10-CM

## 2021-05-26 DIAGNOSIS — T50.902A INTENTIONAL DRUG OVERDOSE, INITIAL ENCOUNTER (HCC): ICD-10-CM

## 2021-05-26 LAB
ALBUMIN SERPL-MCNC: 2.9 G/DL (ref 3.5–5)
ALBUMIN/GLOB SERPL: 0.8 {RATIO} (ref 1.1–2.2)
ALP SERPL-CCNC: 65 U/L (ref 45–117)
ALT SERPL-CCNC: 14 U/L (ref 12–78)
ANION GAP SERPL CALC-SCNC: 6 MMOL/L (ref 5–15)
AST SERPL W P-5'-P-CCNC: 8 U/L (ref 15–37)
BACTERIA SPEC CULT: NORMAL
BASOPHILS # BLD: 0 K/UL (ref 0–0.1)
BASOPHILS NFR BLD: 1 % (ref 0–1)
BILIRUB SERPL-MCNC: 0.2 MG/DL (ref 0.2–1)
BUN SERPL-MCNC: 11 MG/DL (ref 6–20)
BUN/CREAT SERPL: 19 (ref 12–20)
CA-I BLD-MCNC: 8.7 MG/DL (ref 8.5–10.1)
CHLORIDE SERPL-SCNC: 110 MMOL/L (ref 97–108)
CO2 SERPL-SCNC: 24 MMOL/L (ref 21–32)
CREAT SERPL-MCNC: 0.59 MG/DL (ref 0.55–1.02)
DIFFERENTIAL METHOD BLD: ABNORMAL
EOSINOPHIL # BLD: 0.3 K/UL (ref 0–0.4)
EOSINOPHIL NFR BLD: 6 % (ref 0–7)
ERYTHROCYTE [DISTWIDTH] IN BLOOD BY AUTOMATED COUNT: 15.9 % (ref 11.5–14.5)
GLOBULIN SER CALC-MCNC: 3.6 G/DL (ref 2–4)
GLUCOSE SERPL-MCNC: 78 MG/DL (ref 65–100)
HCT VFR BLD AUTO: 36.3 % (ref 35–47)
HGB BLD-MCNC: 11.4 G/DL (ref 11.5–16)
IMM GRANULOCYTES # BLD AUTO: 0 K/UL (ref 0–0.04)
IMM GRANULOCYTES NFR BLD AUTO: 0 % (ref 0–0.5)
LYMPHOCYTES # BLD: 2.1 K/UL (ref 0.8–3.5)
LYMPHOCYTES NFR BLD: 47 % (ref 12–49)
MCH RBC QN AUTO: 25.8 PG (ref 26–34)
MCHC RBC AUTO-ENTMCNC: 31.4 G/DL (ref 30–36.5)
MCV RBC AUTO: 82.1 FL (ref 80–99)
MONOCYTES # BLD: 0.3 K/UL (ref 0–1)
MONOCYTES NFR BLD: 8 % (ref 5–13)
NEUTS SEG # BLD: 1.7 K/UL (ref 1.8–8)
NEUTS SEG NFR BLD: 38 % (ref 32–75)
NRBC # BLD: 0 K/UL (ref 0–0.01)
NRBC BLD-RTO: 0 PER 100 WBC
PLATELET # BLD AUTO: 285 K/UL (ref 150–400)
PMV BLD AUTO: 12 FL (ref 8.9–12.9)
POTASSIUM SERPL-SCNC: 3.7 MMOL/L (ref 3.5–5.1)
PROT SERPL-MCNC: 6.5 G/DL (ref 6.4–8.2)
RBC # BLD AUTO: 4.42 M/UL (ref 3.8–5.2)
SODIUM SERPL-SCNC: 140 MMOL/L (ref 136–145)
SPECIAL REQUESTS,SREQ: NORMAL
WBC # BLD AUTO: 4.5 K/UL (ref 3.6–11)

## 2021-05-26 PROCEDURE — 74011250636 HC RX REV CODE- 250/636: Performed by: FAMILY MEDICINE

## 2021-05-26 PROCEDURE — 65220000003 HC RM SEMIPRIVATE PSYCH

## 2021-05-26 PROCEDURE — 74011250637 HC RX REV CODE- 250/637: Performed by: FAMILY MEDICINE

## 2021-05-26 PROCEDURE — 85025 COMPLETE CBC W/AUTO DIFF WBC: CPT

## 2021-05-26 PROCEDURE — 36415 COLL VENOUS BLD VENIPUNCTURE: CPT

## 2021-05-26 PROCEDURE — 80053 COMPREHEN METABOLIC PANEL: CPT

## 2021-05-26 RX ORDER — TRAZODONE HYDROCHLORIDE 50 MG/1
50 TABLET ORAL
Status: DISCONTINUED | OUTPATIENT
Start: 2021-05-26 | End: 2021-06-02 | Stop reason: HOSPADM

## 2021-05-26 RX ORDER — ESCITALOPRAM OXALATE 10 MG/1
20 TABLET ORAL DAILY
Status: DISCONTINUED | OUTPATIENT
Start: 2021-05-27 | End: 2021-06-02 | Stop reason: HOSPADM

## 2021-05-26 RX ORDER — GUAIFENESIN 600 MG/1
600 TABLET, EXTENDED RELEASE ORAL 2 TIMES DAILY
Status: DISCONTINUED | OUTPATIENT
Start: 2021-05-26 | End: 2021-05-26 | Stop reason: HOSPADM

## 2021-05-26 RX ORDER — GUAIFENESIN 600 MG/1
600 TABLET, EXTENDED RELEASE ORAL 2 TIMES DAILY
Qty: 30 TABLET | Refills: 0 | Status: SHIPPED | OUTPATIENT
Start: 2021-05-26

## 2021-05-26 RX ORDER — LACOSAMIDE 50 MG/1
100 TABLET ORAL 2 TIMES DAILY
Status: DISCONTINUED | OUTPATIENT
Start: 2021-05-26 | End: 2021-06-02 | Stop reason: HOSPADM

## 2021-05-26 RX ORDER — ALBUTEROL SULFATE 90 UG/1
2 AEROSOL, METERED RESPIRATORY (INHALATION)
Status: DISCONTINUED | OUTPATIENT
Start: 2021-05-26 | End: 2021-06-02 | Stop reason: HOSPADM

## 2021-05-26 RX ORDER — LEVETIRACETAM 500 MG/1
1000 TABLET ORAL 2 TIMES DAILY
Status: DISCONTINUED | OUTPATIENT
Start: 2021-05-26 | End: 2021-06-02 | Stop reason: HOSPADM

## 2021-05-26 RX ORDER — ARIPIPRAZOLE 5 MG/1
5 TABLET ORAL DAILY
Status: DISCONTINUED | OUTPATIENT
Start: 2021-05-27 | End: 2021-06-02 | Stop reason: HOSPADM

## 2021-05-26 RX ORDER — HYDROXYZINE PAMOATE 25 MG/1
25 CAPSULE ORAL
Status: DISCONTINUED | OUTPATIENT
Start: 2021-05-26 | End: 2021-06-02 | Stop reason: HOSPADM

## 2021-05-26 RX ADMIN — PANTOPRAZOLE SODIUM 40 MG: 40 TABLET, DELAYED RELEASE ORAL at 08:26

## 2021-05-26 RX ADMIN — ARIPIPRAZOLE 5 MG: 2 TABLET ORAL at 08:25

## 2021-05-26 RX ADMIN — LACOSAMIDE 50 MG: 50 TABLET, FILM COATED ORAL at 08:26

## 2021-05-26 RX ADMIN — ESCITALOPRAM OXALATE 10 MG: 10 TABLET ORAL at 08:27

## 2021-05-26 RX ADMIN — LEVETIRACETAM 500 MG: 500 TABLET ORAL at 08:25

## 2021-05-26 RX ADMIN — SODIUM CHLORIDE 75 ML/HR: 9 INJECTION, SOLUTION INTRAVENOUS at 02:45

## 2021-05-26 RX ADMIN — LACOSAMIDE 50 MG: 50 TABLET, FILM COATED ORAL at 01:10

## 2021-05-26 RX ADMIN — PRENATAL VIT W/ FE FUMARATE-FA TAB 27-0.8 MG 1 TABLET: 27-0.8 TAB at 08:27

## 2021-05-26 RX ADMIN — ALBUTEROL SULFATE 2 PUFF: 108 AEROSOL, METERED RESPIRATORY (INHALATION) at 10:25

## 2021-05-26 NOTE — PROGRESS NOTES
Skin Assessment    Patient's skin is warm, dry, and intact. Color appropriate for race and no skin breakdown noted.

## 2021-05-26 NOTE — BSMART NOTE
Pt assessed face to face in Rm 522. Pt laying in the bed with a 1:1 in attendance. Pt hs flat affect calm soft spoken and answers all questions with direct eye contact. Pt denies SI HI Hallucinations at this time. Pt was brought to ED on 5-24 s/p a SA due to OD. Pt states she lost custody of her 2 children in court to their dad. Their ages are 3 and 8. Pt states that sent her over the edge. Pt lives with her mom and step dad who are her supports. Pt presented with SA spain to OD. Pt presented with shows no evidence of impairment and shows no evidence of impairment. Pt thought process shows no evidence of impairment Pt cognition impaired decision making, states she was impulsive after the court hearing. Pt reports has been hospitalized twice Pt reports Outpatient Psychiatrist Laurie Penny with [facility] Pt does have a hx of legal issues. States she has court on [address] for a debt. Pt does not have hx of violence/aggression Pt reports no substance use Dr. Melchor Muñoz contacted and reports pt meets inpatient level of care and will be admitted to [address] [place] [address] pending medical clearance This writer notified assigned Misael, DWAIN and assigned physician ***. This writer will follow up as needed.

## 2021-05-26 NOTE — CONSULTS
[address] [facility]    Name:  Supa Garcia  MR#:  [id number]  :  1995  ACCOUNT #:  [de-identified]  DATE OF SERVICE:  2021    PSYCHIATRIC CONSULTATION    REASON FOR CONSULTATION:  Suicidal, depression, ordered by Dr. Leobardo Sanchez. I saw the patient. Chart reviewed. I spoke with the one-to-one staff. This is a 80-year-old female patient admitted to Medical Inpatient from [facility] ED. Reportedly, took at 2:30 p.m. overdose with 3 g of lacosamide, 1 g of trazodone, 450 mg Benadryl. The patient reportedly vomited after taking. She was found unconscious in her car, had a suicidal ideation. CHIEF COMPLAINT:  \"I overdosed yesterday, Benadryl 25 mg, 18 of them; trazodone 20 tablets, Vimpat 30 , 100 of them. HISTORY OF PRESENT ILLNESS:  She says she is going through a lot. Had a miscarriage in 2021. She and children's father are . She moved out. To spite her, the children's father told her that he will get full custody. Apparently, had a court hearing yesterday, got full custody. The patient could not. The patient is currently seeing a nurse practitioner, Joie Krishna with [facility]. Taking Abilify 5 mg daily, Lexapro 20 mg daily, trazodone 50 mg. Says her appetite is fluctuating. Sleep has increased. Energy and motivation have decreased. PAST HISTORY:  The patient says she has a prior history of three suicide attempts. She was having problems since age 25. FAMILY HISTORY:  A sister and mother both have depression. TRAUMA:  The patient basically verbal, emotional abuse. SUBSTANCE ABUSE:  Alcohol - socially with the family. Drugs, denied it. SOCIAL HISTORY:  Cigarettes half a pack per day, wants patch. She is working at [employer], fabricating industrial commercial refrigerator system. She says she likes it. SUPPORT:  Mom and sisters. LABORATORY DATA:  Acetaminophen 10, salicylate 2.5.   Metabolic panel:  Elevated blood chloride 110, CK 97. COVID not detected. MENTAL STATUS:  Average height, medium-built female patient. Alert, verbal, well dressed, well groomed, polite, cooperative. Acknowledged having made a suicidal attempt, but denies any further suicidal thought at present. Not suicidal anymore. No hallucination, no delusions. Thoughts are linear and logical.  Normal motor activity. IQ about average. Insight limited. Judgment is poor by history, fair by testing. DIAGNOSES:  AXIS I:  Major depression, recurrent, acute, severe, without psychosis; nicotine abuse; overdose with pills; suicidal attempt. DISPOSITION:  The patient needs an inpatient level of care. Right now, she has one-to-one staff. After medical clearance, recommend transfer to the [address] [place] [address] Unit for further period of assessment, treatment stabilization, disposition. Continued inpatient level of care indicated. I will hold off giving any medication tonight.         Jaz Rogel MD      [id number]  D:  05/25/2021 22:01  T:  05/26/2021 2:36  JOB #:  [id number]

## 2021-05-26 NOTE — PROGRESS NOTES
.  This RN completed safety rounds on patient q 1-2 hours throughout 12 hour night shift. Lisa Mention at bedside. Safety maintained. No needs made known at this time.

## 2021-05-26 NOTE — PROGRESS NOTES
Problem: Suicide  Goal: *STG: Remains safe in hospital  Outcome: Progressing Towards Goal  Goal: *STG: Seeks staff when feelings of self harm or harm towards others arise  Outcome: Progressing Towards Goal  Goal: *STG: Attends activities and groups  Outcome: Progressing Towards Goal  Goal: *STG:  Verbalizes alternative ways of dealing with maladaptive feelings/behaviors  Outcome: Progressing Towards Goal  Goal: *STG/LTG: Complies with medication therapy  Outcome: Progressing Towards Goal  Goal: *STG/LTG: No longer expresses self destructive or suicidal thoughts  Outcome: Progressing Towards Goal  Goal: *LTG:  Identifies available community resources  Outcome: Progressing Towards Goal  Goal: Interventions  Outcome: Progressing Towards Goal

## 2021-05-26 NOTE — DISCHARGE SUMMARY
Discharge Summary       PATIENT ID: Ely Walter  MRN: [id number]   YOB: 1995    DATE OF ADMISSION: 2021  8:48 PM    DATE OF DISCHARGE:   PRIMARY CARE PROVIDER: Sussy Burnett MD     ATTENDING PHYSICIAN: Amadeo Boyd  DISCHARGING PROVIDER: Amadeo Boyd      CONSULTATIONS: IP CONSULT TO PSYCHIATRY    PROCEDURES/SURGERIES: * No surgery found *    ADMITTING DIAGNOSES:    Patient Active Problem List    Diagnosis Date Noted    [facility]    Drug overdose 2021    Antepartum hemorrhage 2021    Gastritis 2021    Anemia 2019    Moderate depressed bipolar I disorder ([facility] 2019    Hyperlipidemia 2017    Mild intermittent asthma 2017    Seizure disorder ([facility] 2017    Encounter for supervision of other normal pregnancy, first trimester 2016    Missed  2015       DISCHARGE DIAGNOSES / PLAN:      \Drug overdose with trazodone  Suicidal attempt  Severe depression  Borderline personality disorder  Seizure disorder  Cough congestion        DISCHARGE MEDICATIONS:  Current Discharge Medication List      START taking these medications    Details   guaiFENesin ER (MUCINEX) 600 mg ER tablet Take 1 Tablet by mouth two (2) times a day. Qty: 30 Tablet, Refills: 0  Start date: 2021         CONTINUE these medications which have NOT CHANGED    Details   albuterol (PROVENTIL HFA, VENTOLIN HFA, PROAIR HFA) 90 mcg/actuation inhaler Take 2 Puffs by inhalation every six (6) hours as needed for Wheezing. lacosamide (Vimpat) 50 mg tab tablet Vimpat 50 mg tablet   TK 1 T PO BID      levETIRAcetam (KEPPRA) 500 mg tablet Take 500 mg by mouth two (2) times a day.  Indications: TONIC-CLONIC EPILEPSY TREATMENT ADJUNCT      venlafaxine-SR (EFFEXOR-XR) 150 mg capsule venlafaxine  mg capsule,extended release 24 hr   TK 1 C PO QD      escitalopram oxalate (LEXAPRO) 10 mg tablet       ondansetron (ZOFRAN ODT) 8 mg disintegrating tablet Take 1 Tab by mouth every eight (8) hours as needed for Nausea. Qty: 30 Tab, Refills: 2      omeprazole (PRILOSEC) 20 mg capsule omeprazole 20 mg capsule,delayed release      ARIPiprazole (Abilify) 5 mg tablet Take  by mouth daily. prenatal vit-calcium-iron-fa (PRENATAL PLUS, CALCIUM CARB,) 27 mg iron- 1 mg tab Take 1 Tab by mouth daily. Indications: PREGNANCY         STOP taking these medications       ibuprofen (MOTRIN) 800 mg tablet Comments:   Reason for Stopping:                 NOTIFY YOUR PHYSICIAN FOR ANY OF THE FOLLOWING:   Fever over 101 degrees for 24 hours. Chest pain, shortness of breath, fever, chills, nausea, vomiting, diarrhea, change in mentation, falling, weakness, bleeding. Severe pain or pain not relieved by medications. Or, any other signs or symptoms that you may have questions about. DISPOSITION:  x  Home With:   OT  PT  HH  RN       Long term SNF/Inpatient Rehab    Independent/assisted living    Hospice    Other:       PATIENT CONDITION AT DISCHARGE: Stable      PHYSICAL EXAMINATION AT DISCHARGE:  General:          Alert, cooperative, no distress, appears stated age. HEENT:           Atraumatic, anicteric sclerae, pink conjunctivae                          No oral ulcers, mucosa moist, throat clear, dentition fair  Neck:               Supple, symmetrical  Lungs:             Clear to auscultation bilaterally. No Wheezing or Rhonchi. No rales. Chest wall:      No tenderness  No Accessory muscle use. Heart:              Regular  rhythm,  No  murmur   No edema  Abdomen:        Soft, non-tender. Not distended. Bowel sounds normal  Extremities:     No cyanosis. No clubbing,                            Skin turgor normal, Capillary refill normal  Skin:                Not pale. Not Jaundiced  No rashes   Psych:             Not anxious or agitated.   Neurologic:      Alert, moves all extremities, answers questions appropriately and responds to commands     No orders to display        Recent Results (from the past 24 hour(s))   METABOLIC PANEL, COMPREHENSIVE    Collection Time: 05/26/21  6:39 AM   Result Value Ref Range    Sodium 140 136 - 145 mmol/L    Potassium 3.7 3.5 - 5.1 mmol/L    Chloride 110 (H) 97 - 108 mmol/L    CO2 24 21 - 32 mmol/L    Anion gap 6 5 - 15 mmol/L    Glucose 78 65 - 100 mg/dL    BUN 11 6 - 20 mg/dL    Creatinine 0.59 0.55 - 1.02 mg/dL    BUN/Creatinine ratio 19 12 - 20      GFR est AA >60 >60 ml/min/1.73m2    GFR est non-AA >60 >60 ml/min/1.73m2    Calcium 8.7 8.5 - 10.1 mg/dL    Bilirubin, total 0.2 0.2 - 1.0 mg/dL    AST (SGOT) 8 (L) 15 - 37 U/L    ALT (SGPT) 14 12 - 78 U/L    Alk. phosphatase 65 45 - 117 U/L    Protein, total 6.5 6.4 - 8.2 g/dL    Albumin 2.9 (L) 3.5 - 5.0 g/dL    Globulin 3.6 2.0 - 4.0 g/dL    A-G Ratio 0.8 (L) 1.1 - 2.2     CBC WITH AUTOMATED DIFF    Collection Time: 05/26/21  6:39 AM   Result Value Ref Range    WBC 4.5 3.6 - 11.0 K/uL    RBC 4.42 3.80 - 5.20 M/uL    HGB 11.4 (L) 11.5 - 16.0 g/dL    HCT 36.3 35.0 - 47.0 %    MCV 82.1 80.0 - 99.0 FL    MCH 25.8 (L) 26.0 - 34.0 PG    MCHC 31.4 30.0 - 36.5 g/dL    RDW 15.9 (H) 11.5 - 14.5 %    PLATELET 628 372 - 147 K/uL    MPV 12.0 8.9 - 12.9 FL    NRBC 0.0 0.0  WBC    ABSOLUTE NRBC 0.00 0.00 - 0.01 K/uL    NEUTROPHILS 38 32 - 75 %    LYMPHOCYTES 47 12 - 49 %    MONOCYTES 8 5 - 13 %    EOSINOPHILS 6 0 - 7 %    BASOPHILS 1 0 - 1 %    IMMATURE GRANULOCYTES 0 0 - 0.5 %    ABS. NEUTROPHILS 1.7 (L) 1.8 - 8.0 K/UL    ABS. LYMPHOCYTES 2.1 0.8 - 3.5 K/UL    ABS. MONOCYTES 0.3 0.0 - 1.0 K/UL    ABS. EOSINOPHILS 0.3 0.0 - 0.4 K/UL    ABS. BASOPHILS 0.0 0.0 - 0.1 K/UL    ABS. IMM.  GRANS. 0.0 0.00 - 0.04 K/UL    DF AUTOMATED            HOSPITAL COURSE:  Patient is a 22y.o. year old female with signal past medical history of borderline personality disorder severe depression history of seizure came to the ER due to suicidal attempt yesterday she lost custody of the child and was severe sleep depressed and want to kill herself took 20 pills of trazodone sleeping in the car police woke her up she started vomiting seen by the ER physician and patient was admitted with drug overdose suicidal ideation and attempt and severe depression    Patient was seen by the psychiatrist patient now alert awake not in distress patient transferred to psychiatric floor in stable condition the patient has some cough congestion start on Mucinex      Signed:   Razia Valentine MD  5/26/2021  1:29 PM

## 2021-05-26 NOTE — PROGRESS NOTES
General Daily Progress Note          Patient Name:   Nimisha Nye       YOB: 1995       Age:  22 y. o. Admit Date: 5/24/2021      Subjective:         Cough cough and congestion        Objective:     Visit Vitals  /72   Pulse 66   Temp 97.9 °F (36.6 °C)   Resp 20   Ht 5' 4\" (1.626 m)   Wt 84.4 kg (186 lb)   SpO2 96%   BMI 31.93 kg/m²        Recent Results (from the past 24 hour(s))   METABOLIC PANEL, COMPREHENSIVE    Collection Time: 05/26/21  6:39 AM   Result Value Ref Range    Sodium 140 136 - 145 mmol/L    Potassium 3.7 3.5 - 5.1 mmol/L    Chloride 110 (H) 97 - 108 mmol/L    CO2 24 21 - 32 mmol/L    Anion gap 6 5 - 15 mmol/L    Glucose 78 65 - 100 mg/dL    BUN 11 6 - 20 mg/dL    Creatinine 0.59 0.55 - 1.02 mg/dL    BUN/Creatinine ratio 19 12 - 20      GFR est AA >60 >60 ml/min/1.73m2    GFR est non-AA >60 >60 ml/min/1.73m2    Calcium 8.7 8.5 - 10.1 mg/dL    Bilirubin, total 0.2 0.2 - 1.0 mg/dL    AST (SGOT) 8 (L) 15 - 37 U/L    ALT (SGPT) 14 12 - 78 U/L    Alk. phosphatase 65 45 - 117 U/L    Protein, total 6.5 6.4 - 8.2 g/dL    Albumin 2.9 (L) 3.5 - 5.0 g/dL    Globulin 3.6 2.0 - 4.0 g/dL    A-G Ratio 0.8 (L) 1.1 - 2.2     CBC WITH AUTOMATED DIFF    Collection Time: 05/26/21  6:39 AM   Result Value Ref Range    WBC 4.5 3.6 - 11.0 K/uL    RBC 4.42 3.80 - 5.20 M/uL    HGB 11.4 (L) 11.5 - 16.0 g/dL    HCT 36.3 35.0 - 47.0 %    MCV 82.1 80.0 - 99.0 FL    MCH 25.8 (L) 26.0 - 34.0 PG    MCHC 31.4 30.0 - 36.5 g/dL    RDW 15.9 (H) 11.5 - 14.5 %    PLATELET 770 161 - 762 K/uL    MPV 12.0 8.9 - 12.9 FL    NRBC 0.0 0.0  WBC    ABSOLUTE NRBC 0.00 0.00 - 0.01 K/uL    NEUTROPHILS 38 32 - 75 %    LYMPHOCYTES 47 12 - 49 %    MONOCYTES 8 5 - 13 %    EOSINOPHILS 6 0 - 7 %    BASOPHILS 1 0 - 1 %    IMMATURE GRANULOCYTES 0 0 - 0.5 %    ABS. NEUTROPHILS 1.7 (L) 1.8 - 8.0 K/UL    ABS. LYMPHOCYTES 2.1 0.8 - 3.5 K/UL    ABS. MONOCYTES 0.3 0.0 - 1.0 K/UL    ABS. EOSINOPHILS 0.3 0.0 - 0.4 K/UL    ABS. BASOPHILS 0.0 0.0 - 0.1 K/UL    ABS. IMM. GRANS. 0.0 0.00 - 0.04 K/UL    DF AUTOMATED       [unfilled]      Review of Systems    Constitutional: Negative for chills and fever. HENT: Negative. Eyes: Negative. Respiratory: Negative. Cardiovascular: Negative. Gastrointestinal: Negative for abdominal pain and nausea. Skin: Negative. Neurological: Negative. Physical Exam:      Constitutional: pt is oriented to person, place, and time. HENT:   Head: Normocephalic and atraumatic. Eyes: Pupils are equal, round, and reactive to light. EOM are normal.   Cardiovascular: Normal rate, regular rhythm and normal heart sounds. Pulmonary/Chest: Breath sounds normal. No wheezes. No rales. Exhibits no tenderness. Abdominal: Soft. Bowel sounds are normal. There is no abdominal tenderness. There is no rebound and no guarding. Musculoskeletal: Normal range of motion. Neurological: pt is alert and oriented to person, place, and time. No orders to display        Recent Results (from the past 24 hour(s))   METABOLIC PANEL, COMPREHENSIVE    Collection Time: 05/26/21  6:39 AM   Result Value Ref Range    Sodium 140 136 - 145 mmol/L    Potassium 3.7 3.5 - 5.1 mmol/L    Chloride 110 (H) 97 - 108 mmol/L    CO2 24 21 - 32 mmol/L    Anion gap 6 5 - 15 mmol/L    Glucose 78 65 - 100 mg/dL    BUN 11 6 - 20 mg/dL    Creatinine 0.59 0.55 - 1.02 mg/dL    BUN/Creatinine ratio 19 12 - 20      GFR est AA >60 >60 ml/min/1.73m2    GFR est non-AA >60 >60 ml/min/1.73m2    Calcium 8.7 8.5 - 10.1 mg/dL    Bilirubin, total 0.2 0.2 - 1.0 mg/dL    AST (SGOT) 8 (L) 15 - 37 U/L    ALT (SGPT) 14 12 - 78 U/L    Alk.  phosphatase 65 45 - 117 U/L    Protein, total 6.5 6.4 - 8.2 g/dL    Albumin 2.9 (L) 3.5 - 5.0 g/dL    Globulin 3.6 2.0 - 4.0 g/dL    A-G Ratio 0.8 (L) 1.1 - 2.2     CBC WITH AUTOMATED DIFF    Collection Time: 05/26/21  6:39 AM   Result Value Ref Range    WBC 4.5 3.6 - 11.0 K/uL    RBC 4.42 3.80 - 5.20 M/uL HGB 11.4 (L) 11.5 - 16.0 g/dL    HCT 36.3 35.0 - 47.0 %    MCV 82.1 80.0 - 99.0 FL    MCH 25.8 (L) 26.0 - 34.0 PG    MCHC 31.4 30.0 - 36.5 g/dL    RDW 15.9 (H) 11.5 - 14.5 %    PLATELET 817 870 - 484 K/uL    MPV 12.0 8.9 - 12.9 FL    NRBC 0.0 0.0  WBC    ABSOLUTE NRBC 0.00 0.00 - 0.01 K/uL    NEUTROPHILS 38 32 - 75 %    LYMPHOCYTES 47 12 - 49 %    MONOCYTES 8 5 - 13 %    EOSINOPHILS 6 0 - 7 %    BASOPHILS 1 0 - 1 %    IMMATURE GRANULOCYTES 0 0 - 0.5 %    ABS. NEUTROPHILS 1.7 (L) 1.8 - 8.0 K/UL    ABS. LYMPHOCYTES 2.1 0.8 - 3.5 K/UL    ABS. MONOCYTES 0.3 0.0 - 1.0 K/UL    ABS. EOSINOPHILS 0.3 0.0 - 0.4 K/UL    ABS. BASOPHILS 0.0 0.0 - 0.1 K/UL    ABS. IMM. GRANS. 0.0 0.00 - 0.04 K/UL    DF AUTOMATED         Results     Procedure Component Value Units Date/Time    SARS-COV-2 [[id number]] Collected: 05/24/21 2230    Order Status: Completed Specimen: Nasopharyngeal Updated: 05/25/21 0012     SARS-CoV-2 Not Detected        Comment:   The specimen is NEGATIVE for SARS-CoV2, the novel coronavirus associated with COVID-19. A negative result does not rule out COVID-19. This test has been authorized by the FDA under an Emergency Use Authorization (EUA) for use by authorized laboratories.    Fact sheet for Healthcare Providers: ConventionUpdate.co.nz Fact sheet for Patients: ConventionUpdate.co.nz   Methodology: Rapid RT-PCR       CULTURE, URINE [[id number]] Collected: 05/24/21 2130    Order Status: Completed Specimen: Urine Updated: 05/25/21 0037           Labs:     Recent Labs     05/26/21  0639 05/24/21  2200   WBC 4.5 6.7   HGB 11.4* 11.5   HCT 36.3 37.0    302     Recent Labs     05/26/21  0639 05/24/21 2200    141   K 3.7 4.3   * 110*   CO2 24 25   BUN 11 7   CREA 0.59 0.61   GLU 78 79   CA 8.7 8.8     Recent Labs     05/26/21  0639 05/24/21 2200   ALT 14 17   AP 65 72   TBILI 0.2 0.1*   TP 6.5 7.4   ALB 2.9* 3.5   GLOB 3.6 3.9     No results for input(s): INR, PTP, APTT, INREXT in the last 72 hours. No results for input(s): FE, TIBC, PSAT, FERR in the last 72 hours. No results found for: FOL, RBCF   No results for input(s): PH, PCO2, PO2 in the last 72 hours.   Recent Labs     05/24/21  2200   CPK 97     No results found for: CHOL, CHOLX, CHLST, CHOLV, HDL, HDLP, LDL, LDLC, DLDLP, TGLX, TRIGL, TRIGP, CHHD, CHHDX  No results found for: 4295  [employer]  Lab Results   Component Value Date/Time    Color Yellow/Straw 05/24/2021 09:30 PM    Appearance Clear 05/24/2021 09:30 PM    Specific gravity 1.017 05/24/2021 09:30 PM    pH (UA) 6.0 05/24/2021 09:30 PM    Protein Negative 05/24/2021 09:30 PM    Glucose Negative 05/24/2021 09:30 PM    Ketone Negative 05/24/2021 09:30 PM    Bilirubin Negative 05/24/2021 09:30 PM    Urobilinogen 0.1 05/24/2021 09:30 PM    Nitrites Negative 05/24/2021 09:30 PM    Leukocyte Esterase Negative 05/24/2021 09:30 PM    Bacteria Negative 05/24/2021 09:30 PM    WBC 0-5 05/24/2021 09:30 PM    RBC 0-5 05/24/2021 09:30 PM         Assessment:       Drug overdose with trazodone  Suicidal attempt  Severe depression  Borderline personality disorder  Seizure disorder  Cough congestion    Plan:     Mucinex 600 twice daily  Continue home medication including Abilify 5 mg daily  Lexapro 10 mg daily  Vimpat 50 mg every 12 hours  Keppra 500 twice a day  Protonix 40 daily  Prenatal vitamins  Heparin 5000 units subcu for DVT prophylaxis    Possible transfer to psych floor tomorrow      Current Facility-Administered Medications:     guaiFENesin ER (MUCINEX) tablet 600 mg, 600 mg, Oral, BID, Wilver Boyd MD    ondansetron (ZOFRAN ODT) tablet 8 mg, 8 mg, Oral, Q8H PRN, Wilver Boyd MD    albuterol (PROVENTIL HFA, VENTOLIN HFA, PROAIR HFA) inhaler 2 Puff, 2 Puff, Inhalation, Q6H PRN, Wilver Boyd MD, 2 Puff at 05/26/21 1025    ARIPiprazole (ABILIFY) tablet 5 mg, 5 mg, Oral, DAILY, Wilver Boyd MD, 5 mg at 05/26/21 0896    escitalopram oxalate (LEXAPRO) tablet 10 mg, 10 mg, Oral, DAILY, Cole Amaya MD, 10 mg at 05/26/21 0827    lacosamide (VIMPAT) tablet 50 mg, 50 mg, Oral, Q12H, Wilver Boyd MD, 50 mg at 05/26/21 3987    levETIRAcetam (KEPPRA) tablet 500 mg, 500 mg, Oral, BID, Wilver Boyd MD, 500 mg at 05/26/21 0825    pantoprazole (PROTONIX) tablet 40 mg, 40 mg, Oral, DAILY, Wilver Boyd MD, 40 mg at 05/26/21 1243    prenatal vit no.130-iron-folic tablet 1 Tablet, 1 Tablet, Oral, DAILY, Wilver Boyd MD, 1 Tablet at 05/26/21 0827    0.9% sodium chloride infusion, 75 mL/hr, IntraVENous, CONTINUOUS, Wilver Boyd MD, Last Rate: 75 mL/hr at 05/26/21 0245, 75 mL/hr at 05/26/21 0245    acetaminophen (TYLENOL) tablet 650 mg, 650 mg, Oral, Q6H PRN **OR** acetaminophen (TYLENOL) suppository 650 mg, 650 mg, Rectal, Q6H PRN, Wilver Boyd MD    polyethylene glycol (MIRALAX) packet 17 g, 17 g, Oral, DAILY PRN, Wilver Boyd MD    ondansetron [facility]) injection 4 mg, 4 mg, IntraVENous, Q4H PRN, Cole Amaya MD

## 2021-05-27 PROBLEM — F33.2 DEPRESSION, MAJOR, SEVERE RECURRENCE (HCC): Status: ACTIVE | Noted: 2021-05-27

## 2021-05-27 PROBLEM — F32.9 MAJOR DEPRESSION: Status: ACTIVE | Noted: 2021-05-27

## 2021-05-27 PROCEDURE — 74011250637 HC RX REV CODE- 250/637: Performed by: PSYCHIATRY & NEUROLOGY

## 2021-05-27 PROCEDURE — 65220000003 HC RM SEMIPRIVATE PSYCH

## 2021-05-27 RX ORDER — OLANZAPINE 5 MG/1
5 TABLET ORAL
Status: DISCONTINUED | OUTPATIENT
Start: 2021-05-27 | End: 2021-06-02 | Stop reason: HOSPADM

## 2021-05-27 RX ORDER — ACETAMINOPHEN 325 MG/1
650 TABLET ORAL
Status: DISCONTINUED | OUTPATIENT
Start: 2021-05-27 | End: 2021-05-27

## 2021-05-27 RX ORDER — LORAZEPAM 2 MG/ML
1 INJECTION INTRAMUSCULAR
Status: DISCONTINUED | OUTPATIENT
Start: 2021-05-27 | End: 2021-06-02 | Stop reason: HOSPADM

## 2021-05-27 RX ORDER — BENZTROPINE MESYLATE 1 MG/1
1 TABLET ORAL
Status: DISCONTINUED | OUTPATIENT
Start: 2021-05-27 | End: 2021-06-02 | Stop reason: HOSPADM

## 2021-05-27 RX ORDER — DIPHENHYDRAMINE HYDROCHLORIDE 50 MG/ML
50 INJECTION, SOLUTION INTRAMUSCULAR; INTRAVENOUS
Status: DISCONTINUED | OUTPATIENT
Start: 2021-05-27 | End: 2021-06-02 | Stop reason: HOSPADM

## 2021-05-27 RX ORDER — ACETAMINOPHEN 325 MG/1
650 TABLET ORAL
Status: DISCONTINUED | OUTPATIENT
Start: 2021-05-27 | End: 2021-06-02 | Stop reason: HOSPADM

## 2021-05-27 RX ORDER — ADHESIVE BANDAGE
30 BANDAGE TOPICAL DAILY PRN
Status: DISCONTINUED | OUTPATIENT
Start: 2021-05-27 | End: 2021-06-02 | Stop reason: HOSPADM

## 2021-05-27 RX ORDER — ADHESIVE BANDAGE
30 BANDAGE TOPICAL DAILY PRN
Status: DISCONTINUED | OUTPATIENT
Start: 2021-05-27 | End: 2021-05-27 | Stop reason: SDUPTHER

## 2021-05-27 RX ORDER — HALOPERIDOL 5 MG/ML
5 INJECTION INTRAMUSCULAR
Status: DISCONTINUED | OUTPATIENT
Start: 2021-05-27 | End: 2021-06-02 | Stop reason: HOSPADM

## 2021-05-27 RX ORDER — HYDROXYZINE 50 MG/1
50 TABLET, FILM COATED ORAL
Status: DISCONTINUED | OUTPATIENT
Start: 2021-05-27 | End: 2021-05-27

## 2021-05-27 RX ORDER — TRAZODONE HYDROCHLORIDE 50 MG/1
50 TABLET ORAL
Status: DISCONTINUED | OUTPATIENT
Start: 2021-05-27 | End: 2021-05-27 | Stop reason: SDUPTHER

## 2021-05-27 RX ADMIN — LEVETIRACETAM 1000 MG: 500 TABLET, FILM COATED ORAL at 21:08

## 2021-05-27 RX ADMIN — LEVETIRACETAM 1000 MG: 500 TABLET, FILM COATED ORAL at 00:28

## 2021-05-27 RX ADMIN — LACOSAMIDE 100 MG: 50 TABLET, FILM COATED ORAL at 22:24

## 2021-05-27 RX ADMIN — LACOSAMIDE 100 MG: 50 TABLET, FILM COATED ORAL at 08:57

## 2021-05-27 RX ADMIN — ESCITALOPRAM OXALATE 20 MG: 10 TABLET ORAL at 08:57

## 2021-05-27 RX ADMIN — LACOSAMIDE 100 MG: 50 TABLET, FILM COATED ORAL at 00:27

## 2021-05-27 RX ADMIN — ARIPIPRAZOLE 5 MG: 5 TABLET ORAL at 08:57

## 2021-05-27 RX ADMIN — LEVETIRACETAM 1000 MG: 500 TABLET, FILM COATED ORAL at 08:57

## 2021-05-27 NOTE — BH NOTES
[address] [facility] signed treatment plan and all other sigs were completed.

## 2021-05-27 NOTE — GROUP NOTE
IP  GROUP DOCUMENTATION INDIVIDUAL                                                                          Group Therapy Note    Date: 5/27/2021    Group Start Time: 1400  Group End Time: 1500  Group Topic: AA/NA    [facility]    Edgar Pardo    IP [address] GROUP DOCUMENTATION GROUP    Group Therapy Note  The therapist facilitated a group discussion about addiction, and how it has impacted the patient's lives. Attendees: 7         Attendance: Attended    Patient's Goal:      Interventions/techniques: Informed and Supported    Follows Directions: Followed directions    Interactions: Interacted appropriately    Mental Status: Calm    Behavior/appearance: Attentive    Goals Achieved: Able to listen to others      Additional Notes: The patient was present throughout group although did not share anything.      Alonzo Eli

## 2021-05-27 NOTE — CONSULTS
Consult    Patient: Anastasia Cabrales MRN: [id number]  SSN: [id number]    YOB: 1995  Age: 22 y.o. Sex: female      Subjective:      Anastasia Cabrales is a 22 y.o. female who is being seen for medical evaluation for inpatient psychiatric treatment. Has a history of seizure and asthma last seizure attack was in May 5    Past Medical History:   Diagnosis Date    Anemia 11/13/2019    Asthma     Epilepsy ([address]     Seizures ([address]     last seizure two weeks ago     Past Surgical History:   Procedure Laterality Date    HX OTHER SURGICAL Left 2008    Left eye cyst removal      No family history on file.   Social History     Tobacco Use    Smoking status: Current Every Day Smoker     Packs/day: 5.00     Years: 2.00     Pack years: 10.00     Types: Cigarettes    Smokeless tobacco: Never Used   Substance Use Topics    Alcohol use: Yes      Current Facility-Administered Medications   Medication Dose Route Frequency Provider Last Rate Last Admin    acetaminophen (TYLENOL) tablet 650 mg  650 mg Oral Q4H PRN Ramy Jasmine MD        magnesium hydroxide (MILK OF MAGNESIA) 400 mg/5 mL oral suspension 30 mL  30 mL Oral DAILY PRN Ramy Jasmine MD        OLANZapine (ZyPREXA) tablet 5 mg  5 mg Oral Q6H PRN Ramy Jasmine MD        haloperidol lactate (HALDOL) injection 5 mg  5 mg IntraMUSCular Q6H PRN aRmy Jasmine MD        benztropine (COGENTIN) tablet 1 mg  1 mg Oral BID PRN Sindhu Mccloud MD        diphenhydrAMINE (BENADRYL) injection 50 mg  50 mg IntraMUSCular BID PRN Ramy Jasmine MD        LORazepam (ATIVAN) injection 1 mg  1 mg IntraMUSCular Q4H PRN Tami Jasmine MD        levETIRAcetam (KEPPRA) tablet 1,000 mg  1,000 mg Oral BID Radha WINSLOW MD   1,000 mg at 05/27/21 0857    lacosamide (VIMPAT) tablet 100 mg  100 mg Oral BID Radha WINSLOW MD   100 mg at 05/27/21 0857    escitalopram oxalate (LEXAPRO) tablet 20 mg  20 mg Oral DAILY Ramy Jasmine MD   20 mg at 05/27/21 0857    ARIPiprazole (ABILIFY) tablet 5 mg  5 mg Oral DAILY Mariel Jasmine MD   5 mg at 05/27/21 0857    albuterol (PROVENTIL HFA, VENTOLIN HFA, PROAIR HFA) inhaler 2 Puff  2 Puff Inhalation Q4H PRN Emily Bright MD        hydrOXYzine pamoate (VISTARIL) capsule 25 mg  25 mg Oral QID PRN Emily Bright MD        traZODone (DESYREL) tablet 50 mg  50 mg Oral QHS PRN Emily Bright MD            Allergies   Allergen Reactions    Citrus And Derivatives Hives    Lamictal [Lamotrigine] Nausea and Vomiting       Review of Systems:  A comprehensive review of systems was negative except for that written in the History of Present Illness. Objective:     Vitals:    05/26/21 2008 05/27/21 0740   BP: (!) 136/93 119/88   Pulse: (!) 116 72   Resp: 18 18   Temp: 98.6 °F (37 °C) 98.3 °F (36.8 °C)   SpO2:  99%        Physical Exam:  General:  Alert, cooperative, no distress, appears stated age. Eyes:  Conjunctivae/corneas clear. PERRL, EOMs intact. Fundi benign   Ears:  Normal TMs and external ear canals both ears. Nose: Nares normal. Septum midline. Mucosa normal. No drainage or sinus tenderness. Mouth/Throat: Lips, mucosa, and tongue normal. Teeth and gums normal.   Neck: Supple, symmetrical, trachea midline, no adenopathy, thyroid: no enlargment/tenderness/nodules, no carotid bruit and no JVD. Back:   Symmetric, no curvature. ROM normal. No CVA tenderness. Lungs:   Clear to auscultation bilaterally. Heart:  Regular rate and rhythm, S1, S2 normal, no murmur, click, rub or gallop. Abdomen:   Soft, non-tender. Bowel sounds normal. No masses,  No organomegaly. Extremities: Extremities normal, atraumatic, no cyanosis or edema. Pulses: 2+ and symmetric all extremities. Skin: Skin color, texture, turgor normal. No rashes or lesions   Lymph nodes: Cervical, supraclavicular, and axillary nodes normal.   Neurologic: CNII-XII intact. Normal strength, sensation and reflexes throughout. No results found for this or any previous visit (from the past 24 hour(s)).     Assessment:     Hospital Problems  Date Reviewed: 7/9/2015        Codes Class Noted POA    Major depression ICD-10-CM: F32.9  ICD-9-CM: 296.20  5/27/2021 Unknown        Depression, major, severe recurrence [facility]) ICD-10-CM: F33.2  ICD-9-CM: 296.33  5/27/2021 Unknown          History of seizure    Plan:     Continue present medications    Signed By: Amrit Calhoun MD     May 27, 2021

## 2021-05-27 NOTE — BH NOTES
Patient moved to room 231W this morning, patient was at first isolative. She did attend group this morning, affect flat to constricted, tearful as she spoke about her 2 sons and the father having custody at this time. Patient denied SI, HI, AH, VH and anxiety. She rated depression at a 3 on a scale of 0-10, saying \"I'm always depressed. \"  Patient remains on close observation, Q 15 minute checks.

## 2021-05-27 NOTE — BH NOTES
Seen in room 522 at 3 PM patient is alert verbal upon walking in her room no side effects slept well ate well depressed tearful Dr. Harsha Ulrich has medically cleared she is waiting to go to behavioral health unit for further treatment she want to go home shared with her given such a massive dose of pills to kill herself she need to be admitted to behavioral health unit for. Of time felt further assessed treat stabilize make proper disposition plans including the separate safety plan and medication management and she is tearful she says she was supposed to be in working at [employer] Monday, 24 May she is missing since then she was there less than a month she was afraid she may to be already terminated so she may not get the job.   At this time her safety is a concern decision has been made for her to be transferred to the psychiatric floor had labs in the chart for details she will need a one-to-one staff on behavioral health unit continued inpatient level of care indicated

## 2021-05-27 NOTE — BH NOTES
[facility] Recreational Therapy Assessment    Orientation:  Person, Place, Date and Situation    Reason for Admission: Pt reports she took an overdose in an attempt to harm self. PT reports trigger was related to\"feeling like the events that took place that day was the last straw and broke me down. Reports she went to court and father of her children (2) got full custody of them. Reports she does have visitation but felt it was last straw when the father of children told her it was done to just get back at her. Related she moved out last month. Medical Precautions / Conditions:  Asthma, Seizures and ecezma    Impairments:     Vision:  Wears Glasses Yes      Wears Contacts No      Are they with Patient Yes     Hearing Aids No     Utilization of Ambulatory Devices:  None    Health Problems Preventing Participation in Activities:  Yes  How:  Limits with some physical activities    Leisure Interest Checklist:  Art, Bingo, Bowling, Cards / Board Games, [employer] [place], Crafts, Dancing, [employer], Listening to Music, [employer], Reading, Singing, TV / [employer], Visiting with Others, Texting, Video Games and Walking    Does patient participate in leisure activities:  Yes    Setting:  With Family    Other Activities / Skills / [facility] [place]:  n/a    Do emotions interfere with leisure activities / lifestyles:  No    When engaging in leisure activities, do you forget worries:  Yes    Do you belong to a [employer]:  No    Are you active in [employer] activities:  No    Typical Day: Pt reports she spends time taking care of children and helping them with school, cook dinner so it is ready at dinner time, goes to work at [employer]. Strengths:  Verbal Expression, Family Support, Social Support, Employment, Education / Cognition, Housing, Providers and Jackie Dillan for mental health at [facility], and Dr Juliana De La Cruz for PCP.     Limitations / Barriers:  Depressed Mood, Sleep and  worry what other people say or think about me and trying to fit in. Treatment Modalities:  Stress Management, Coping Skills, Symptom Recognition, Healthy Thinking, Mood Management and Leisure Skills    Patient Educational  Needs:  Skills to recognize and challenge problematic thinking, Identify positive coping strategies and skills to manage symptoms or moods, Leisure education and Recognition of symptoms and signs    Focus of Treatment:  Introduce positive outlets for self expression and Introduce and encourage the exploration of alternative coping skills    Summary:  Pt lives in [place] with mom. Pt recently left significant other and moved back with mom until she can get back on feet and save up for housing. Dr. Natalia Olson for mental health and [address] for PCP. Reports previous hospitalization in 2018 for similar issues. Pt reports mom is supportive. Pt reports she lost custody of children to her father. Pt reports goal is to [facility] better coping mechanisms and stop acting on impulse\".
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
## 2021-05-27 NOTE — GROUP NOTE
BINTA  GROUP DOCUMENTATION INDIVIDUAL                                                                          Group Therapy Note    Date: 5/27/2021    Group Start Time: 1500  Group End Time: 1540  Group Topic: Process Group - Inpatient    SRM 2 BEHA HLTH ACUTE    Meche Elkins    IP [facility] GROUP DOCUMENTATION GROUP    Group Therapy Note    Attendees: 7/8     Process: Pts were asked to share something they like about themselves as a check in question. Pts were then encouraged to share what brought them to the hospital, discussed anger and things that they want to let go. Pts were encouraged to provide feedback to one another. Pts were then asked to reflect on group         Attendance: Attended    Patient's Goal:  Pt said that she is a good listener     Interventions/techniques: Validated, Promoted peer support, Provide feedback and Supported    Follows Directions: Followed directions    Interactions: Interacted appropriately    Mental Status: Calm, Congruent, Depressed and Flat    Behavior/appearance: Attentive, Neatly groomed and Withdrawn/quiet    Goals Achieved: Able to engage in interactions and Able to listen to others      Additional Notes:  Pt was quiet throughout group. Pt paid attention to peers but did not wish to speak.  Pt is making some progress towards goals by attending and participating in group    [employer]

## 2021-05-27 NOTE — GROUP NOTE
[facility] GROUP DOCUMENTATION INDIVIDUAL                                                                          Group Therapy Note    Date: 5/26/2021    Group Start Time: 1900  Group End Time: 2015  Group Topic: Recreational/Music Therapy    SRM 2  NON ACUTE    Leyla Braun    Attendees: 7   Attendance: Attended    Patient's Goal:     Interventions/techniques: Challenged and Supported    Follows Directions: Followed directions    Interactions: Interacted appropriately    Mental Status: Calm and Flat    Behavior/appearance: Attentive and Cooperative    Goals Achieved: Able to engage in interactions and Able to listen to others      Additional Notes: Attended group and actively participated. Accepted leisure packet and worked on puzzles during group. Interacted with staff and peers when prompted. Verbalized she just arrived to unit today.       Anthony Coy, CTRS
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
## 2021-05-27 NOTE — BH NOTES
PSYCHOSOCIAL ASSESSMENT  :Patient identifying info:   Anni Lawson is a 22 y.o., female admitted 5/26/2021  6:46 PM     Presenting problem and precipitating factors:   Pt reported acting impulsively and overdosing on a handful of trazedone and benedryl. She said that she immediately regretted it and called her mom/ She reported that her and her children's father are on bad terms and he recently took her court for custody and he won. She only gets visitation and said that \"sent her over the edge. \"      Mental status assessment:   Pt presented w a flat affect and congruent mood. She did not appear to be responding to internal stimuli. Her speech and thought process and content was organized. She denied HI and AVH and anxiety. She endorsed SI and depression. Strengths:  Pt identified being diligent at work and being a good mother    Collateral information:   Pt signed for momLukasz    Current psychiatric /substance abuse providers and contact info:   Pt reported seeing Hari Johnson via [facility] for meds but needs a therpaist     Previous psychiatric/substance abuse providers and response to treatment:   Pt reported being hospitalized at [facility] when she was 13 and at [facility] in 2017 or 2018.  She also reported see Dr. Gus Javed at [facility] maybe    Family history of mental illness or substance abuse:   She reported that her mom and sister suffer from depression and bipolar    Substance abuse history:    Pt reported none    Social History     Tobacco Use    Smoking status: Current Every Day Smoker     Packs/day: 5.00     Years: 2.00     Pack years: 10.00     Types: Cigarettes    Smokeless tobacco: Never Used   Substance Use Topics    Alcohol use: Yes       History of biomedical complications associated with substance abuse :  Pt reported none    Patient's current acceptance of treatment or motivation for change:  Pt reported wanting to open up more and stop bottling her feelings     Family constellation:   Pt reported having her parents, grandparents, siblings, and ex stepfather    Is significant other involved?    Pt reported being single     Describe support system:   Pt identified her mom and little sister and stepfather (old or new?)    Describe living arrangements and home environment:  Pt reported living w her mom and said that it is a safe place to return to    Health issues:   Pt reported having asthma, seizures, and eczema    Hospital Problems  Date Reviewed: 2015        Codes Class Noted POA    Major depression ICD-10-CM: F32.9  ICD-9-CM: 296.20  2021 Unknown        Depression, major, severe recurrence ([facility] ICD-10-CM: F33.2  ICD-9-CM: 296.33  2021 Unknown              Trauma history:   Pt reported none     Legal issues:   Pt reported none     History of  service:   Pt reported none    Financial status:   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

## 2021-05-27 NOTE — BH NOTES
Admission Note    Patient is a 22year old black woman admitted for a diagnosis of depression. She is alert and oriented x 4. Patient denies any SI/HI/AH/VH. Restricted affect and sad mood. She is tearful during assessment. Patient rates anxiety and depression 6/10. She states that she because upset because her children's gained custody of their children and she became impulsive. Patient states that she has a therapist and should have scheduled an appointment. No contraband found upon body search. Vital signs stable. Patient states that she was last hospitalized in  for similar post partum depression and states that she had absolute no motivation and could not get out of the bed or take care of her  baby. 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 Ary Varela states that the court judged against her because she did not have a place for her children. 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 Staff will continue to monitor patient for safety.

## 2021-05-27 NOTE — BH NOTES
COLLATERAL ATTEMPT    Writer called pt's mother but she was in the car with the pt's son. Writer will try again later or tomorrow.

## 2021-05-27 NOTE — BH NOTES
Pt was asking to leave bc she is concerned about losing her job if she does not return by Monday. Writer explained the TDO process to her and the pt agreed to stay voluntarily. Writer also told her she'd benefit from a few more days on this floor doing groups.

## 2021-05-27 NOTE — BH NOTES
PSA PART II ADDITIONAL INFORMATION        Access To Fire Arms: No    Substance Use: no    Request to See : no    Release of Information Signed: yes    Release of Information Signed For: Cedeno Taney, mom

## 2021-05-28 PROCEDURE — 65220000003 HC RM SEMIPRIVATE PSYCH

## 2021-05-28 PROCEDURE — 74011250637 HC RX REV CODE- 250/637: Performed by: PSYCHIATRY & NEUROLOGY

## 2021-05-28 RX ADMIN — LEVETIRACETAM 1000 MG: 500 TABLET, FILM COATED ORAL at 08:39

## 2021-05-28 RX ADMIN — ARIPIPRAZOLE 5 MG: 5 TABLET ORAL at 08:39

## 2021-05-28 RX ADMIN — LACOSAMIDE 100 MG: 50 TABLET, FILM COATED ORAL at 21:21

## 2021-05-28 RX ADMIN — ESCITALOPRAM OXALATE 20 MG: 10 TABLET ORAL at 08:39

## 2021-05-28 RX ADMIN — LEVETIRACETAM 1000 MG: 500 TABLET, FILM COATED ORAL at 21:21

## 2021-05-28 NOTE — GROUP NOTE
[facility] GROUP DOCUMENTATION INDIVIDUAL                                                                          Group Therapy Note    Date: 5/28/2021    Group Start Time: 1400  Group End Time: 0660  Group Topic: Special Track - Drug Topic    SRM 2 BH NON ACUTE    Empire Conn    IP [facility] GROUP DOCUMENTATION GROUP    Group Therapy Note    Attendees: 7    Pt's were encouraged to participate in a group session today focused on Relapse Prevention. We utilized this time to talk about the importance of awareness and understanding triggers and cues to those triggers. This group focused on identifying some triggers as well as identifying \"dangerous people\" in their lives, talking about the danger and then identifying a coping skill. Lastly, we discussed how to deal with a \"slip up\" and coping skills that may be beneficial in those situations. Pt's were encouraged to process their thoughts, feelings and provide encouragement to each other. Attendance: Did not attend    Patient's Goal:  NA    Interventions/techniques: NA    Follows Directions: NA    Interactions: NA    Mental Status: NA    Behavior/appearance: NA    Goals Achieved: NA      Additional Notes:  Pt did not attend despite being invited. She was laying in her bed in her room and was upset because she wanted to leave. D-19 was being contacted.      Enrique Zavala, [address] [facility], [address] [place] [address]

## 2021-05-28 NOTE — BH NOTES
Behavioral Health Treatment Team Note     Patient goal(s) for today: \"I just want to leave\"   Treatment team focus/goals: medication management, group therapy    Progress note: Pt presented as a polite and cooperative [place] American female who appeared approximately her stated age of 22years old. She was oriented to person, place, time and situation. She maintained appropriate contact and responded to questions in a manner suggestive of her ability to comprehend questions posed of her. Pt presented for this discussion neatly though casually dressed and with appropriate attention to hygiene. Her thought process and content were generally clear, coherent, and goal directed and free of any bizarre ideation or perceptual experiences. There is no evidence of problems with memory or focus. She indicated no difficulty with sleep and appetite. Pt denied experiencing hallucinations or flashbacks. She denied suicidal or homicidal ideation. Pt reported that her behaviors were impulsive and that she immediately regretted them. She indicated that she is not suicidal and doesn't want to die. Pt stated that she wants to leave and specifically requested to speak with D-19.      LOS:  2  Expected LOS: 5-7    Insurance info/prescription coverage:  [facility] MEDICAID/[facility]  Date of last family contact:  None   Family requesting physician contact today:  No  Discharge plan:  Therapist will collaborate with Pt re discharge planning    Guns in the home:  No  Outpatient provider(s):  None at this time     Participating treatment team members: Lilian Fields, [address]

## 2021-05-28 NOTE — BH NOTES
Pt indicated that she wants to leave. D-19 contacted for assessment. Documentation faxed to D-19. Assessment being completed by [facility].

## 2021-05-28 NOTE — BH NOTES
Pt assessed by Frank Robbins, reports that pt is to stay voluntary for treatment, if pt should change mind, TDO is recommended.

## 2021-05-28 NOTE — GROUP NOTE
IP  GROUP DOCUMENTATION INDIVIDUAL                                                                          Group Therapy Note    Date: 5/28/2021    Group Start Time: 1115  Group End Time: 1200  Group Topic: Education Group - Inpatient    SRM 2  NON ACUTE    Zack Clark    [facility] GROUP DOCUMENTATION GROUP    Group Therapy Note    Facilitated discussion focused on \"the characteristics of healthy relationships\" and \"being able to recognize different attitudes and behaviors present in relationships    Attendees: 6/8         Attendance: Attended    Patient's Goal:  *STG: Attends activities and groups        Interventions/techniques: Informed and Supported    Follows Directions: Followed directions    Interactions: Interacted appropriately    Mental Status: Calm    Behavior/appearance: Cooperative    Goals Achieved: Able to engage in interactions and Able to listen to others      Additional Notes:  Pt was receptive to information and engaged in discussion. Pt shared communication being open and being assertive is present in her relationship.  Pt shared she has to work on not holding [address] [place], [address]

## 2021-05-28 NOTE — BH NOTES
Alert and oriented. Ambulating in hallway and room, socializing in solarium. Denies SI/HI/AH/VH. Took medications as ordered. No distress noted. Resting in bed at this time.

## 2021-05-28 NOTE — GROUP NOTE
[facility] GROUP DOCUMENTATION INDIVIDUAL                                                                          Group Therapy Note    Date: 5/28/2021    Group Start Time: 1300  Group End Time: 5863  Group Topic: Process Group - Inpatient    SRM 2 BEHA HLTH ACUTE    Lillie Citizen    IP [facility] GROUP DOCUMENTATION GROUP    Group Therapy Note    Attendees: All pts were encouraged to attend however only 3/5 attended. This writer wrote topics on the board for the group to choose from if they didn't have anything specific to talk about. However, the pts mainly discussed what they have learned during their treatment. We also discussed coping skills, stress, letting go, and opening up. In the end they reflected on how group went. Attendance: Attended    Patient's Goal:  To attend groups and activities     Interventions/techniques: Challenged, Informed, Validated, Promoted peer support, Provide feedback and Supported    Follows Directions: Followed directions    Interactions: Interacted appropriately    Mental Status: Calm, Congruent and Flat    Behavior/appearance: Agitated, Attentive, Cooperative and Withdrawn/quiet    Goals Achieved: Able to listen to others and Able to self-disclose      Additional Notes:  Pt participated some. She was visibly annoyed. She identified learning to open up and having someone to talk to as a positive thing she has experienced since being here. She reported not needing to stay any longer bc she just acted on impulse when she attempted suicide and she just needs outside resources. She also expressed being annoyed that she has not met her CM yet. Her CM then came and grabbed her out of group half way through.      Rashid Page

## 2021-05-28 NOTE — GROUP NOTE
Patient Name: Pita Bueno    Esophagogastroduodenoscopy/Colonoscopy Procedure Note  Date of Procedure: 4/7/2021  Attending Physician: Sae Carias M.D.        Indications: Abdominal pain  Instrument: Olympus Flexible Variable Stiffness Endoscope and Colonoscope  Sedation:   Anesthesiologist/Type of Anesthesia:  Anesthesiologist: Jd East M.D./RAGHAVENDRA    Surgical Staff:  Circulator: Israel Weller R.N.  Endoscopy Technician: Jazz Pena    Specimens removed if any:  ID Type Source Tests Collected by Time Destination   A :  Tissue Duodenum PATHOLOGY SPECIMEN Sae Carias M.D. 4/7/2021  1:59 PM    B :  Tissue Gastric PATHOLOGY SPECIMEN Sae Carias M.D. 4/7/2021  2:01 PM    C : GE junction bx Tissue Esophagus PATHOLOGY SPECIMEN Sae Carias M.D. 4/7/2021  2:03 PM    D :  Tissue Esophagus PATHOLOGY SPECIMEN Sae Carias M.D. 4/7/2021  2:05 PM           Procedure Details:      Pre-Anesthesia Assessment:  Prior to the procedure, a History and Physical was performed, and patient medications and allergies were reviewed. The patient’s tolerance of previous anesthesia was also reviewed. The risks and benefits of the procedure and the sedation options and risks were discussed with the patient including but not limited to infection, bleeding, aspiration, perforation, adverse medication reaction, missed diagnosis, and missed lesions. The patient verbalized understanding. All questions were answered, and informed consent was obtained.     After I obtained informed consent from the patient, the patient was placed in the left lateral position. Appropriate time-out protocol was followed: the correct patient, the correct procedure, and the correct equipment in the room were confirmed. Throughout the procedure, the patient’s blood pressure, pulse, and oxygen saturations were monitored continuously    The Olympus flexible gastroscope was gently passed through the incisoral orifice into the  [facility] GROUP DOCUMENTATION INDIVIDUAL                                                                          Group Therapy Note    Date: 5/27/2021    Group Start Time: 1900  Group End Time: 2000  Group Topic: Recreational/Music Therapy    SRM 2 BH NON ACUTE    Ryan Canales    IP [facility] GROUP DOCUMENTATION GROUP    Group Therapy Note    Attendees: 12    Introduce healthy leisure task skill as positive way to cope and manage stress. Attendance: Attended    Patient's Goal:  STG: Demonstrates reduction in symptoms and increase in insight into coping skills/future focused        Interventions/techniques: Art integration and Supported    Follows Directions: Followed directions    Interactions: Interacted appropriately    Mental Status: Calm and Flat    Behavior/appearance: Attentive and Cooperative    Goals Achieved: Able to engage in interactions and Able to listen to others      Additional Notes: Attended group and actively participated. Received leisure activity packet. Worked on puzzles and listened to music played in group. Able to select song to listen to. Was receptive to intervention and responded to staff prompts. Used leisure time constructively.      Emerick Opitz, CTRS oral cavity and under direct visualization the esophagus was intubated. The endoscope was passed down the esophagus, through the stomach and into the 2nd portion of the duodenum. Retroflexion was performed at the gastroesophageal junction. Color, texture, mucosa and anatomy of esophagus, stomach, and duodenum were carefully examined with the scope.  After completion of the examination, the endoscope as removed. The patient tolerated the procedure well. There were no immediate postoperative complications.     After completion of EGD, a digital rectal exam was performed. The Olympus variable stiffness colonoscope was introduced through the ostomy and passed under direct vision. The scope was advanced to the cecum, identified by the appendiceal orifice and ileocecal valve. The colonscopy was performed without difficulty. The patient tolerated the procedure well. The quality of the bowel preparation was POOR.  Findings and interventions were performed and documented below. The endoscope was then removed and the procedure was terminated. There were no immediate postoperative complications.        Findings:    Esophagus: Normal-appearing esophagus.  Biopsies taken to rule out inflammation.  Irregular Z-line at 40 cm with possible grade A erosive esophagitis.  Four-quadrant biopsy of the GE junction performed  Stomach mild erythema in the antrum no ulceration.  Biopsies taken.  Retroflexion otherwise normal.  Duodenum first and second portion duodenum appeared normal biopsies taken    Colonoscopy:  Cecum: Suboptimal prep with scatter stool with fibrous material unable to be suctioned out  Ascending colon: Suboptimal prep with scatter stool with fibrous material unable to be suctioned out  Transverse colon: Suboptimal prep with scatter stool with fibrous material unable to be suctioned out. 1 polyp approximately 8mm in size, not removed. Intra-operative discussion was conducted with friend/family member due to the  suboptimal prep, it was felt to be best removed during complete clean out.   Descending colon: Suboptimal prep with scatter stool with fibrous material unable to be suctioned out  Stoma: appears healthy.    Impressions:   1.  Grade a erosive esophagitis at the GE junction biopsied  2.  Gastric erythema in the antrum   3. Biopsy of the duodenum, stomach, esophagus  4.  Suboptimal prep in the colon unable to visualize significant portion of the colonic mucosa despite extensive lavage and suctioning  5.  8 mm transverse colon polyp not removed      Recommendations:   1.  Monitor for postprocedure complication including perforation bleeding  2.  Re-prepp and reschedule for colonoscopy, order sent to office.  3.  Stay on clear liquid diet      This note was generated using voice recognition software which has a small chance of producing errors of grammar and possibly content. I have made every reasonable attempt to find and correct any obvious errors, but expect that some may not be found prior to finalization of this note

## 2021-05-28 NOTE — BH NOTES
Dx: MDD w/Suicide Attempt (OD)    Affect restricted. Minimal interactions. No eye to eye contact. Said she is ready to be d/c. Compliant with scheduled meds. Consumed 3/4 of bfkt. Attended to hygiene and dressed appropriately. Encouraged to attend groups. Q 15 mins checks maintained, for safety.

## 2021-05-28 NOTE — BH NOTES
Patient requesting nicotine patch, reports that she has been asking for several days to no avail, reports that she got frustrated and stopped asking. KAREN RN [employer].

## 2021-05-29 PROCEDURE — 65220000003 HC RM SEMIPRIVATE PSYCH

## 2021-05-29 PROCEDURE — 74011250637 HC RX REV CODE- 250/637: Performed by: PSYCHIATRY & NEUROLOGY

## 2021-05-29 RX ORDER — HYDROCORTISONE 10 MG/ML
LOTION TOPICAL 2 TIMES DAILY
Status: DISCONTINUED | OUTPATIENT
Start: 2021-05-29 | End: 2021-06-02 | Stop reason: HOSPADM

## 2021-05-29 RX ADMIN — ESCITALOPRAM OXALATE 20 MG: 10 TABLET ORAL at 09:16

## 2021-05-29 RX ADMIN — LEVETIRACETAM 1000 MG: 500 TABLET, FILM COATED ORAL at 09:17

## 2021-05-29 RX ADMIN — HYDROCORTISONE: 1 LOTION TOPICAL at 21:00

## 2021-05-29 RX ADMIN — LEVETIRACETAM 1000 MG: 500 TABLET, FILM COATED ORAL at 21:08

## 2021-05-29 RX ADMIN — LACOSAMIDE 100 MG: 50 TABLET, FILM COATED ORAL at 21:08

## 2021-05-29 RX ADMIN — LACOSAMIDE 100 MG: 50 TABLET, FILM COATED ORAL at 11:42

## 2021-05-29 RX ADMIN — ARIPIPRAZOLE 5 MG: 5 TABLET ORAL at 09:16

## 2021-05-29 NOTE — GROUP NOTE
IP  GROUP DOCUMENTATION INDIVIDUAL                                                                          Group Therapy Note    Date: 5/29/2021    Group Start Time: 1100  Group End Time: 1200  Group Topic: Recreational/Music Therapy    SRM 2  NON ACUTE    Gretchen Hotter    [facility] GROUP DOCUMENTATION GROUP    Group Therapy Note    Attendees: 12  Group discussion to introduce to the The flight or fight response and impact on body, and positive coping strategies. Attendance: Attended    Patient's Goal: STG: Demonstrates reduction in symptoms and increase in insight into coping skills/future focused      Interventions/techniques: Informed, Provide feedback and Supported    Follows Directions: Followed directions    Interactions: Interacted appropriately    Mental Status: Calm and Flat    Behavior/appearance: Attentive and Cooperative    Goals Achieved: Able to engage in interactions and Able to listen to others      Additional Notes: Attended group and participated in group discussions. Received materials and was receptive to materials provided. PT verbalized ways anxiety has impacted body and emotions in past and reflected on healthy and unhealthy coping strategies. Related to use of coping skills that have been potentially harmful to self and looked at positive ways to cope in future. Pt stated she feels that she works hard to do the right thing but feels she does not always communicate properly. Stated she could write things down to help her organize her thoughts.      Blake Jennings, CTRS

## 2021-05-29 NOTE — BH NOTES
Patient case discussed with treatment team patient seen for follow-up she is pushing for discharge.   Willing to try the day 19 for a TDO dismissal anxious depressed but not suicidal attending the groups verbal articulate says she can live with her mother or hoping to go to work by Monday no self-destructive behavior noted appetite sleep energy motivation good guarded but somewhat her vital signs today temperature 98.5 pulse 100 respiration 18 blood pressure 1[birth year]1987 labs reviewed some labs are done but not resulted yet review tomorrow

## 2021-05-29 NOTE — GROUP NOTE
BINTA  GROUP DOCUMENTATION INDIVIDUAL                                                                          Group Therapy Note    Date: 5/29/2021    Group Start Time: 1300  Group End Time: 9945  Group Topic: Process Group - Inpatient    SRM 2 BH NON ACUTE    Anam Woodruff [address] GROUP DOCUMENTATION GROUP    Group Therapy Note    This writer facilitated a process group geared towards cognitive distortions, health coping mechanisms, and symptom management. This writer tasked each patient with discussing their thoughts and feelings regarding their current treatment. Attendees: 10 out of 19         Attendance: Attended    Patient's Goal:  Attend activities and groups. Interventions/techniques: Challenged, Informed and Supported    Follows Directions: Followed directions    Interactions: Interacted appropriately    Mental Status: Calm and Flat    Behavior/appearance: Attentive, Cooperative and Withdrawn/quiet    Goals Achieved: Able to engage in interactions, Able to listen to others and Identified feelings      Additional Notes:  Patient attended group. She reported she was feeling tired and bored. She did not report any issues or concerns. She remained quiet for most of the group. She did not verbally contribute to the group discussion, but she exhibited good active listening skills.      Sarmad Nice

## 2021-05-29 NOTE — BH NOTES
Pt has attended groups as scheduled. Appropriately interacted with peer and staff throughout shift. Pt denied thoughts of self harm. Pt stated she is less depressed and less anxious. Rated anxiety and depression \"2\". Pt talked about how she misses her kids and is wanting to see them but has avoided talking with them omn the phone to not get them upset. Pt stated she is hopeful to be discharged next week.

## 2021-05-29 NOTE — BH NOTES
Pt presents calm, pleasant, cooperative, quiet and isolative to self in community, limited interest in interaction w/ peers, minimally participatory in assessment, shift spent attending groups, watching TV, and occasionally socializing, oriented to all spheres  No bx issues noted, med compliant  Denies SI HI NSSI denies sleep med appetite problems denies AVH  Continuing to monitor

## 2021-05-29 NOTE — GROUP NOTE
IP  GROUP DOCUMENTATION INDIVIDUAL                                                                          Group Therapy Note    Date: 5/28/2021    Group Start Time: 1900  Group End Time: 2000  Group Topic: Recreational/Music Therapy    SRM 2  NON ACUTE    Marlyce Flakes    [facility] GROUP DOCUMENTATION GROUP    Group Therapy Note    Attendees:9  Introduced healthy leisure task skill group as positive way to cope and manage mood. Attendance: Attended    Patient's Goal:  STG: Attends activities and groups      Interventions/techniques: Art integration and Supported    Follows Directions: Followed directions    Interactions: Interacted appropriately    Mental Status: Calm and Flat    Behavior/appearance: Attentive and Cooperative    Goals Achieved: Able to engage in interactions and Able to listen to others      Additional Notes: Attended group. Accepted leisure packet and worked on art task/puzzles. Selected songs to listen to and verbalized enjoyment. Was able to engage with staff and sat through entire session. Was receptive to intervention and used leisure time constructively.     Francesca Warner, ARIANS

## 2021-05-29 NOTE — SUICIDE SAFETY PLAN
SAFETY PLAN 
 
A suicide Safety Plan is a document that supports someone when they are having thoughts of suicide. Warning Signs that indicate a suicidal crisis may be developing: What (situations, thoughts, feelings, body sensations, behaviors, etc.) do you experience that lets you know you are beginning to think about suicide? 1. *** 
2. *** 
3. *** Internal Coping Strategies:  What things can I do (relaxation techniques, hobbies, physical activities, etc.) to take my mind off my problems without contacting another person? 1. *** 
2. *** 
3. *** People and social settings that provide distraction: Who can I call or where can I go to distract me? 1. Name: ***  Phone: *** 
2. Name: ***  Phone: *** 3. Place: ***           
4. Place: *** People whom I can ask for help: Who can I call when I need help - for example, friends, family, clergy, someone else? 1. Name: ***                Phone: *** 
2. Name: ***  Phone: *** 
3. Name: ***  Phone: *** Professionals or [address] [place] [address] I can contact during a crisis: Who can I call for help - for example, my doctor, my psychiatrist, my psychologist, a mental health provider, a suicide hotline? 1. Clinician Name: ***   Phone: *** Clinician Pager or Emergency Contact #: *** 
 
2. Clinician Name: ***   Phone: *** Clinician Pager or Emergency Contact #: *** 
 
3. Suicide Prevention Lifeline: 1-556-723-TALK (5964) 4. [address] [place] [address] Emergency Services -  for example, [facility] suicide hotline, [facility] Hotline: *** Emergency Services Address: *** Emergency Services Phone: *** Making the environment safe: How can I make my environment (house/apartment/living space) safer? For example, can I remove guns, medications, and other items?  
1. *** 
2. ***

## 2021-05-29 NOTE — PROGRESS NOTES
Progress Note  Date:2021       Room:[facility]  Patient Name:Xochilt Bills     YOB: 1995     Age:25 y.o. Subjective    Subjective   Ms. Agni Woods reports she was hospitalized for intentional overdose. She states she has been coming in terms and trying to get the help that she needs. She has support from mom and stepdad. She admits she has had difficulties with quick temperament and anger depression and some alcohol use. She states she has been continuing to feel better. Denies any side effects from her medications denies any active plan of suicide or homicide. Mental status examination casually dressed groomed appropriate on conversation still presents more guarded to herself mostly isolated not much interactions with all peers. Insight judgment coping continuing to improve no evidence of any psychosis. Review of Systems  Objective         Vitals Last 24 Hours:  TEMPERATURE:  Temp  Av.6 °F (37 °C)  Min: 98.3 °F (36.8 °C)  Max: 98.8 °F (37.1 °C)  RESPIRATIONS RANGE: Resp  Av  Min: 18  Max: 18  PULSE OXIMETRY RANGE: No data recorded  PULSE RANGE: Pulse  Av.5  Min: 78  Max: 93  BLOOD PRESSURE RANGE: Systolic (08PHL), TAB:020 , Min:125 , BUR:909   ; Diastolic (64DJB), BRODERICK:42, Min:79, Max:93    I/O (24Hr): No intake or output data in the 24 hours ending 21 1236  Objective  Labs/Imaging/Diagnostics    Labs:  CBC:No results for input(s): WBC, RBC, HGB, HCT, MCV, RDW, PLT, HGBEXT, HCTEXT, PLTEXT in the last 72 hours. CHEMISTRIES:No results for input(s): NA, K, CL, CO2, BUN, CA, PHOS, MG in the last 72 hours. No lab exists for component: CREATININE, GLUCOSEPT/INR:No results for input(s): INR, INREXT in the last 72 hours. No lab exists for component: PROTIME  APTT:No results for input(s): APTT in the last 72 hours. LIVER PROFILE:No results for input(s): AST, ALT in the last 72 hours.     No lab exists for component: BILIDIR, BILITOT, [facility]  Lab Results   Component Value Date/Time    ALT (SGPT) 14 05/26/2021 06:39 AM    AST (SGOT) 8 (L) 05/26/2021 06:39 AM    Alk. phosphatase 65 05/26/2021 06:39 AM    Bilirubin, total 0.2 05/26/2021 06:39 AM       Imaging Last 24 Hours:  No results found. Assessment//Plan   Active Problems:    Major depression (5/27/2021)      Depression, major, severe recurrence ([facility] [address] (5/27/2021)      Assessment & Plan  Patient states she is on hydrocortisone for her skin requested to be restarted.   Continue current medications  Provided support and psychoeducation      Current Facility-Administered Medications:     hydrocortisone (ALA-LEONIE) 1 % lotion, , Topical, BID, Lorena Bearden MD    acetaminophen (TYLENOL) tablet 650 mg, 650 mg, Oral, Q4H PRN, Amena Jasmine MD    magnesium hydroxide (MILK OF MAGNESIA) 400 mg/5 mL oral suspension 30 mL, 30 mL, Oral, DAILY PRN, Amena Jasmine MD    OLANZapine (ZyPREXA) tablet 5 mg, 5 mg, Oral, Q6H PRN, Amena Jasmine MD    haloperidol lactate (HALDOL) injection 5 mg, 5 mg, IntraMUSCular, Q6H PRN, Flip Jasmine MD    benztropine (COGENTIN) tablet 1 mg, 1 mg, Oral, BID PRN, Amena Jasmine MD    diphenhydrAMINE (BENADRYL) injection 50 mg, 50 mg, IntraMUSCular, BID PRN, Flip Jasmine MD    LORazepam (ATIVAN) injection 1 mg, 1 mg, IntraMUSCular, Q4H PRN, Flip Jasmine MD    levETIRAcetam (KEPPRA) tablet 1,000 mg, 1,000 mg, Oral, BID, Aruna Roman MD, 1,000 mg at 05/29/21 0917    lacosamide (VIMPAT) tablet 100 mg, 100 mg, Oral, BID, Margaret WINSLOW MD, 100 mg at 05/29/21 1142    escitalopram oxalate (LEXAPRO) tablet 20 mg, 20 mg, Oral, DAILY, Flip Jasmine MD, 20 mg at 05/29/21 0916    ARIPiprazole (ABILIFY) tablet 5 mg, 5 mg, Oral, DAILY, Flip Jasmine MD, 5 mg at 05/29/21 0916    albuterol (PROVENTIL HFA, VENTOLIN HFA, PROAIR HFA) inhaler 2 Puff, 2 Puff, Inhalation, Q4H PRN, Amena Jasmine MD    hydrOXYzine pamoate (VISTARIL) capsule 25 mg, 25 mg, Oral, QID PRN, Suresh Orlando MD    traZODone (DESYREL) tablet 50 mg, 50 mg, Oral, QHS PRN, Suresh Orlando MD  Electronically signed by Marvin Choudhary MD on 5/29/2021 at 12:36 PM

## 2021-05-30 PROCEDURE — 65220000003 HC RM SEMIPRIVATE PSYCH

## 2021-05-30 PROCEDURE — 74011250637 HC RX REV CODE- 250/637: Performed by: PSYCHIATRY & NEUROLOGY

## 2021-05-30 RX ADMIN — ARIPIPRAZOLE 5 MG: 5 TABLET ORAL at 08:56

## 2021-05-30 RX ADMIN — LACOSAMIDE 100 MG: 50 TABLET, FILM COATED ORAL at 08:56

## 2021-05-30 RX ADMIN — LEVETIRACETAM 1000 MG: 500 TABLET, FILM COATED ORAL at 21:02

## 2021-05-30 RX ADMIN — LEVETIRACETAM 1000 MG: 500 TABLET, FILM COATED ORAL at 08:56

## 2021-05-30 RX ADMIN — HYDROCORTISONE: 1 LOTION TOPICAL at 21:02

## 2021-05-30 RX ADMIN — HYDROCORTISONE: 1 LOTION TOPICAL at 09:00

## 2021-05-30 RX ADMIN — ESCITALOPRAM OXALATE 20 MG: 10 TABLET ORAL at 08:56

## 2021-05-30 RX ADMIN — TRAZODONE HYDROCHLORIDE 50 MG: 50 TABLET ORAL at 21:02

## 2021-05-30 RX ADMIN — LACOSAMIDE 100 MG: 50 TABLET, FILM COATED ORAL at 21:02

## 2021-05-30 NOTE — BH NOTES
Pt presents calm, pleasant, cooperative, social and sociable w/ select peers, on-task w/ good tx focus, advocates effectively for self and others, shift spent socializing and watching TV, oriented to all spheres, complaints of rashes on hands, previously seen by internal medicine w/ hydrocortisone cream ordered  No bx issues noted, med compliant  Denies SI HI NSSI, denies sleep med appetite problems denies AVH  Continuing to monitor

## 2021-05-30 NOTE — GROUP NOTE
IP  GROUP DOCUMENTATION INDIVIDUAL                                                                          Group Therapy Note    Date: 5/30/2021    Group Start Time: 1100  Group End Time: 1200  Group Topic: Recreational/Music Therapy    SRM 2  NON ACUTE    Cathleen Clines    IP [facility] GROUP DOCUMENTATION GROUP    Group Therapy Note    Attendees: 7/11  Facilitated structured group to introduce relaxation technique of Diaphragmatic Breathing and practice technique in group. Attendance: Attended    Patient's Goal:  STG:  Verbalizes alternative ways of dealing with maladaptive feelings/behaviors      Interventions/techniques: Informed, Provide feedback and Supported    Follows Directions: Followed directions    Interactions: Interacted appropriately    Mental Status: Calm and Flat    Behavior/appearance: Attentive and Cooperative    Goals Achieved: Able to engage in interactions and Able to listen to others    Additional Notes: Attended group and actively participated in group. Received materials provided and received feedback related to relaxation techniques. Pt participated in relaxation technique - guided imagery group. Pt able to follow exercise and verbalized enjoyment of strategy and stated it was helpful in promoting relaxation.     Alcides Grace

## 2021-05-30 NOTE — GROUP NOTE
BINTA  GROUP DOCUMENTATION INDIVIDUAL Group Therapy Note Date: 5/30/2021 Group Start Time: 7891 Group End Time: 8625 Group Topic: [employer] SRM [facility] GROUP DOCUMENTATION GROUP Group Therapy Note Attendees:  
 
  
 
Attendance: Attended Patient's Goal: Patient stated mood is to stay up and participate in groups. Interventions/techniques: Supported Follows Directions: Followed directions Interactions: Interacted appropriately Mental Status: Calm Behavior/appearance: Attentive Goals Achieved Additional Notes: Patient participated in group activity.  
 
Cherelle Cai

## 2021-05-30 NOTE — BH NOTES
Patient  Alert and oriented, affect brighter, good eye contact, social with peers, and medication compliant. Patient denied SI, HI, AH, and VH. She has attended groups today. Patient remains on close observation, Q 15 minute checks.

## 2021-05-30 NOTE — GROUP NOTE
BINTA  GROUP DOCUMENTATION INDIVIDUAL                                                                          Group Therapy Note    Date: 5/30/2021    Group Start Time: 4842  Group End Time: 1350  Group Topic: Process Group - Inpatient    SRM 2  NON ACUTE    Sofya Roberts [address] GROUP DOCUMENTATION GROUP    Group Therapy Note    This writer facilitated a process group. The intended benefit of group is to encourage the patient to express their thoughts, feelings, and goals for treatment. This writer discussed the importance of a safety plan and tasked the patient with identifying their warning signs, coping mechanisms, and social/community supports. Attendees: 6 out of 11         Attendance: Attended    Patient's Goal:  Attends activities and groups. Interventions/techniques: Informed, Validated, Provide feedback and Supported    Follows Directions: Followed directions    Interactions: Interacted appropriately    Mental Status: Calm and Congruent    Behavior/appearance: Attentive and Cooperative    Goals Achieved: Able to engage in interactions, Able to listen to others, Discussed coping, Discussed safety plan, Identified feelings and Identified resources and support systems      Additional Notes:  Patient was cooperative and engaged in the group. Patient reported she was feeling tired and had only been receiving four hours of sleep each night. Patient completed the group activity. Patient reports the following warning signs: crying, pacing, and chain smoking. Patient reported that her internal coping strategies are taking a walk and writing things down.      Lorri Collet

## 2021-05-30 NOTE — PROGRESS NOTES
Progress Note  Date:2021       Room:[facility]  Patient Sana Horton     YOB: 1995     Age:25 y.o. Subjective    Subjective  Patient reports she is feeling okay this morning. Denies any new concerns denies any active suicidal homicidal feelings  Mental status examination casually dressed and groomed has been engaging in group therapy not voiced any active suicidal thoughts no evidence of any psychosis    Review of Systems no complaints  Objective         Vitals Last 24 Hours:  TEMPERATURE:  Temp  Av.4 °F (36.9 °C)  Min: 98.2 °F (36.8 °C)  Max: 98.5 °F (36.9 °C)  RESPIRATIONS RANGE: Resp  Av  Min: 18  Max: 18  PULSE OXIMETRY RANGE: No data recorded  PULSE RANGE: Pulse  Av  Min: 78  Max: 84  BLOOD PRESSURE RANGE: Systolic (58DXM), DNH:498 , Min:106 , DQY:487   ; Diastolic (28FVZ), EEZ:13, Min:63, Max:64    I/O (24Hr): No intake or output data in the 24 hours ending 21 1317  Objective  Labs/Imaging/Diagnostics    Labs:  CBC:No results for input(s): WBC, RBC, HGB, HCT, MCV, RDW, PLT, HGBEXT, HCTEXT, PLTEXT in the last 72 hours. CHEMISTRIES:No results for input(s): NA, K, CL, CO2, BUN, CA, PHOS, MG in the last 72 hours. No lab exists for component: CREATININE, GLUCOSEPT/INR:No results for input(s): INR, INREXT in the last 72 hours. No lab exists for component: PROTIME  APTT:No results for input(s): APTT in the last 72 hours. LIVER PROFILE:No results for input(s): AST, ALT in the last 72 hours. No lab exists for component: MACRINA RocaPHOS  Lab Results   Component Value Date/Time    ALT (SGPT) 14 2021 06:39 AM    AST (SGOT) 8 (L) 2021 06:39 AM    Alk. phosphatase 65 2021 06:39 AM    Bilirubin, total 0.2 2021 06:39 AM       Imaging Last 24 Hours:  No results found.   Assessment//Plan   Active Problems:    Major depression (2021)      Depression, major, severe recurrence ([facility] (5/27/2021)      Assessment & Plan  Continue current medications and treatment      Current Facility-Administered Medications:     hydrocortisone (ALA-LEONIE) 1 % lotion, , Topical, BID, Lorena Bearden MD, Given at 05/30/21 0900    acetaminophen (TYLENOL) tablet 650 mg, 650 mg, Oral, Q4H PRN, Shahida Jasmine MD    magnesium hydroxide (MILK OF MAGNESIA) 400 mg/5 mL oral suspension 30 mL, 30 mL, Oral, DAILY PRN, Shahida Jasmine MD    OLANZapine (ZyPREXA) tablet 5 mg, 5 mg, Oral, Q6H PRN, Shahida Jasmine MD    haloperidol lactate (HALDOL) injection 5 mg, 5 mg, IntraMUSCular, Q6H PRN, Flip Jasmine MD    benztropine (COGENTIN) tablet 1 mg, 1 mg, Oral, BID PRN, Shahida Jasmine MD    diphenhydrAMINE (BENADRYL) injection 50 mg, 50 mg, IntraMUSCular, BID PRN, Flip Jasmine MD    LORazepam (ATIVAN) injection 1 mg, 1 mg, IntraMUSCular, Q4H PRN, Flip Jasmine MD    levETIRAcetam (KEPPRA) tablet 1,000 mg, 1,000 mg, Oral, BID, Rigo Woodward MD, 1,000 mg at 05/30/21 0856    lacosamide (VIMPAT) tablet 100 mg, 100 mg, Oral, BID, Rigo Woodward MD, 100 mg at 05/30/21 0856    escitalopram oxalate (LEXAPRO) tablet 20 mg, 20 mg, Oral, DAILY, Lillian WINSLOW MD, 20 mg at 05/30/21 0856    ARIPiprazole (ABILIFY) tablet 5 mg, 5 mg, Oral, DAILY, Lillian WINSLOW MD, 5 mg at 05/30/21 0856    albuterol (PROVENTIL HFA, VENTOLIN HFA, PROAIR HFA) inhaler 2 Puff, 2 Puff, Inhalation, Q4H PRN, Flip Jasmine MD    hydrOXYzine pamoate (VISTARIL) capsule 25 mg, 25 mg, Oral, QID PRN, Rigo Woodward MD    traZODone (DESYREL) tablet 50 mg, 50 mg, Oral, QHS PRN, Rigo Woodward MD  Electronically signed by Angelia Quinonez MD on 5/30/2021 at 1:17 PM

## 2021-05-31 PROCEDURE — 65220000003 HC RM SEMIPRIVATE PSYCH

## 2021-05-31 PROCEDURE — 74011250637 HC RX REV CODE- 250/637: Performed by: PSYCHIATRY & NEUROLOGY

## 2021-05-31 RX ADMIN — ARIPIPRAZOLE 5 MG: 5 TABLET ORAL at 08:53

## 2021-05-31 RX ADMIN — HYDROCORTISONE: 1 LOTION TOPICAL at 21:17

## 2021-05-31 RX ADMIN — TRAZODONE HYDROCHLORIDE 50 MG: 50 TABLET ORAL at 21:16

## 2021-05-31 RX ADMIN — LEVETIRACETAM 1000 MG: 500 TABLET, FILM COATED ORAL at 08:53

## 2021-05-31 RX ADMIN — ESCITALOPRAM OXALATE 20 MG: 10 TABLET ORAL at 08:53

## 2021-05-31 RX ADMIN — LEVETIRACETAM 1000 MG: 500 TABLET, FILM COATED ORAL at 21:16

## 2021-05-31 RX ADMIN — LACOSAMIDE 100 MG: 50 TABLET, FILM COATED ORAL at 08:53

## 2021-05-31 RX ADMIN — LACOSAMIDE 100 MG: 50 TABLET, FILM COATED ORAL at 21:16

## 2021-05-31 NOTE — GROUP NOTE
BINTA  GROUP DOCUMENTATION INDIVIDUAL                                                                          Group Therapy Note    Date: 5/31/2021    Group Start Time: 1115  Group End Time: 4273  Group Topic: Education Group - Inpatient    SRM 2 BEHA HLTH ACUTE    Vincent Noriega    IP [address] [facility] GROUP DOCUMENTATION GROUP    Group Therapy Note    Attendees: 7/12    Psych Ed: Pts were asked to share what they wanted out of their treatment at [facility]. Pts then participated in a goal focused activity where they shared goals they want to achieve, have made progress towards and goals they have achieved. Pts were then asked to reflect on group. Attendance: Attended    Patient's Goal:  Pt said that she wants to improve her communication skills    Interventions/techniques: Informed, Validated, Promoted peer support, Provide feedback and Supported    Follows Directions: Followed directions    Interactions: Interacted appropriately    Mental Status: Calm, Congruent and Flat    Behavior/appearance: Attentive, Motivated, Neatly groomed and Pressured speech    Goals Achieved: Able to engage in interactions, Able to listen to others, Able to reflect/comment on own behavior, Able to manage/cope with feelings and Able to self-disclose      Additional Notes:  Pt spoke about how her relationship with her snow father has been 'toxic' and that he filed for custody 'as revenge'. Pt said that she is learning to 'love herself' and 'let go of things I can't control'.  Pt is making progress towards goals by attending and participating in group    [facility]

## 2021-05-31 NOTE — BH NOTES
Pt up and out of room on time for breakfast.  Pt eating 100% of meals offered. Remaining out of room among peers social among others. Affect flat situational brightening. Pt seems focused on trying to figure out when she may be discharged. No management issues on the unit. Interactions among others seen as aproropriate. Pt denies suicidal ideations. Continue 15 minute checks to maintain Pt safety.

## 2021-05-31 NOTE — PROGRESS NOTES
Progress Note  Date:2021       Room:[facility]  Patient Cindy Her     YOB: 1995     Age:25 y.o. Subjective    Subjective  Patient reports she has been feeling better. She wants to know when she can go home denies any suicidal or homicidal ideations. States mood has been better. Sleep has been better. Mental status examination-patient is alert oriented x3 casually dressed groomed has been more interactive a little bit with her peers still to herself no evidence of any psychosis noted  Review of Systems  Objective         Vitals Last 24 Hours:  TEMPERATURE:  Temp  Av °F (36.7 °C)  Min: 97.6 °F (36.4 °C)  Max: 98.4 °F (36.9 °C)  RESPIRATIONS RANGE: Resp  Av  Min: 18  Max: 18  PULSE OXIMETRY RANGE: SpO2  Av %  Min: 100 %  Max: 100 %  PULSE RANGE: Pulse  Av  Min: 74  Max: 76  BLOOD PRESSURE RANGE: Systolic (45SQT), PYV:317 , Min:102 , AHB:518   ; Diastolic (07XDA), LDZ:09, Min:64, Max:77    I/O (24Hr): No intake or output data in the 24 hours ending 21 1131  Objective  Labs/Imaging/Diagnostics    Labs:  CBC:No results for input(s): WBC, RBC, HGB, HCT, MCV, RDW, PLT, HGBEXT, HCTEXT, PLTEXT in the last 72 hours. CHEMISTRIES:No results for input(s): NA, K, CL, CO2, BUN, CA, PHOS, MG in the last 72 hours. No lab exists for component: CREATININE, GLUCOSEPT/INR:No results for input(s): INR, INREXT in the last 72 hours. No lab exists for component: PROTIME  APTT:No results for input(s): APTT in the last 72 hours. LIVER PROFILE:No results for input(s): AST, ALT in the last 72 hours. No lab exists for component: Johann Chambers, ALKPHOS  Lab Results   Component Value Date/Time    ALT (SGPT) 14 2021 06:39 AM    AST (SGOT) 8 (L) 2021 06:39 AM    Alk. phosphatase 65 2021 06:39 AM    Bilirubin, total 0.2 2021 06:39 AM       Imaging Last 24 Hours:  No results found.   Assessment//Plan   Active Problems: Major depression (5/27/2021)      Depression, major, severe recurrence ([facility] (5/27/2021)      Assessment & Plan  Continue current medications and treatment continue group therapy  Patient hopes to go home soon      Current Facility-Administered Medications:     hydrocortisone (ALA-LEONIE) 1 % lotion, , Topical, BID, Lorena Bearden MD, Given at 05/30/21 2102    acetaminophen (TYLENOL) tablet 650 mg, 650 mg, Oral, Q4H PRN, Narciso Jasmine MD    magnesium hydroxide (MILK OF MAGNESIA) 400 mg/5 mL oral suspension 30 mL, 30 mL, Oral, DAILY PRN, Flip Jasmine MD    OLANZapine (ZyPREXA) tablet 5 mg, 5 mg, Oral, Q6H PRN, Narciso Jasmine MD    haloperidol lactate (HALDOL) injection 5 mg, 5 mg, IntraMUSCular, Q6H PRN, Jaylyn Chapa MD    benztropine (COGENTIN) tablet 1 mg, 1 mg, Oral, BID PRN, Jaylyn Chapa MD    diphenhydrAMINE (BENADRYL) injection 50 mg, 50 mg, IntraMUSCular, BID PRN, Jaylyn Chapa MD    LORazepam (ATIVAN) injection 1 mg, 1 mg, IntraMUSCular, Q4H PRN, Jaylyn Chapa MD    levETIRAcetam (KEPPRA) tablet 1,000 mg, 1,000 mg, Oral, BID, Jaylyn Chapa MD, 1,000 mg at 05/31/21 0853    lacosamide (VIMPAT) tablet 100 mg, 100 mg, Oral, BID, Jaylyn Chapa MD, 100 mg at 05/31/21 0853    escitalopram oxalate (LEXAPRO) tablet 20 mg, 20 mg, Oral, DAILY, Jaylyn Chapa MD, 20 mg at 05/31/21 0853    ARIPiprazole (ABILIFY) tablet 5 mg, 5 mg, Oral, DAILY, Jaylyn Chapa MD, 5 mg at 05/31/21 0853    albuterol (PROVENTIL HFA, VENTOLIN HFA, PROAIR HFA) inhaler 2 Puff, 2 Puff, Inhalation, Q4H PRN, Flip Jasmine MD    hydrOXYzine pamoate (VISTARIL) capsule 25 mg, 25 mg, Oral, QID PRN, Jaylyn Chapa MD    traZODone (DESYREL) tablet 50 mg, 50 mg, Oral, QHS PRN, Jaylyn Chapa MD, 50 mg at 05/30/21 2102  Electronically signed by Nehemias Dugan MD on 5/31/2021 at 11:31 AM

## 2021-05-31 NOTE — BH NOTES
Pt presents calm, pleasant, cooperative, silly and attention seeking at times w/o displaying inappropriate bx, fair insight into illness, endorses desire for discharge and limited hopefulness for future, oriented to all spheres, shift spent socializing and watching TV  No bx issues noted, med complianty  Denies SI HI NSSI denies sleep med appetite problems denies AVH  Continuing to monitor

## 2021-06-01 PROCEDURE — 65220000003 HC RM SEMIPRIVATE PSYCH

## 2021-06-01 PROCEDURE — 74011250637 HC RX REV CODE- 250/637: Performed by: PSYCHIATRY & NEUROLOGY

## 2021-06-01 RX ORDER — ESCITALOPRAM OXALATE 20 MG/1
20 TABLET ORAL DAILY
Qty: 30 TABLET | Refills: 0 | Status: SHIPPED | OUTPATIENT
Start: 2021-06-02

## 2021-06-01 RX ORDER — ARIPIPRAZOLE 5 MG/1
5 TABLET ORAL DAILY
Qty: 30 TABLET | Refills: 0 | Status: SHIPPED | OUTPATIENT
Start: 2021-06-02

## 2021-06-01 RX ORDER — ALBUTEROL SULFATE 90 UG/1
2 AEROSOL, METERED RESPIRATORY (INHALATION)
Qty: 1 INHALER | Refills: 0 | Status: SHIPPED | OUTPATIENT
Start: 2021-06-01

## 2021-06-01 RX ORDER — HYDROXYZINE PAMOATE 25 MG/1
25 CAPSULE ORAL
Qty: 60 CAPSULE | Refills: 1 | Status: SHIPPED | OUTPATIENT
Start: 2021-06-01 | End: 2021-06-15

## 2021-06-01 RX ORDER — TRAZODONE HYDROCHLORIDE 50 MG/1
50 TABLET ORAL
Qty: 30 TABLET | Refills: 0 | Status: SHIPPED | OUTPATIENT
Start: 2021-06-01

## 2021-06-01 RX ORDER — LACOSAMIDE 100 MG/1
100 TABLET ORAL 2 TIMES DAILY
Qty: 60 TABLET | Refills: 0 | Status: SHIPPED | OUTPATIENT
Start: 2021-06-01

## 2021-06-01 RX ORDER — LEVETIRACETAM 1000 MG/1
1000 TABLET ORAL 2 TIMES DAILY
Qty: 60 TABLET | Refills: 0 | Status: SHIPPED | OUTPATIENT
Start: 2021-06-01

## 2021-06-01 RX ADMIN — LACOSAMIDE 100 MG: 50 TABLET, FILM COATED ORAL at 21:07

## 2021-06-01 RX ADMIN — ARIPIPRAZOLE 5 MG: 5 TABLET ORAL at 08:22

## 2021-06-01 RX ADMIN — LEVETIRACETAM 1000 MG: 500 TABLET, FILM COATED ORAL at 21:07

## 2021-06-01 RX ADMIN — LEVETIRACETAM 1000 MG: 500 TABLET, FILM COATED ORAL at 08:22

## 2021-06-01 RX ADMIN — HYDROCORTISONE: 1 LOTION TOPICAL at 21:21

## 2021-06-01 RX ADMIN — ESCITALOPRAM OXALATE 20 MG: 10 TABLET ORAL at 08:22

## 2021-06-01 RX ADMIN — TRAZODONE HYDROCHLORIDE 50 MG: 50 TABLET ORAL at 21:07

## 2021-06-01 RX ADMIN — LACOSAMIDE 100 MG: 50 TABLET, FILM COATED ORAL at 10:54

## 2021-06-01 NOTE — GROUP NOTE
BINTA  GROUP DOCUMENTATION INDIVIDUAL                                                                          Group Therapy Note    Date: 6/1/2021    Group Start Time: 1010  Group End Time: 0481  Group Topic: Nursing    SRM 2  NON ACUTE    Wyatt Au LPN    [facility] GROUP DOCUMENTATION GROUP    Group Therapy Note    Attendees: 9/13         Attendance: Attended    Patient's Goal: ID stress symptoms &stress reducers    Interventions/techniques: Informed    Follows Directions: Followed directions    Interactions: Interacted appropriately    Mental Status: Calm    Behavior/appearance: Cooperative    Goals Achieved: Able to listen to others      Additional Notes:  Pt.  Was verbally active & receptive to the information discussed    Maddy Gracia LPN

## 2021-06-01 NOTE — BH NOTES
COLLATERAL CONTACT    The patient's mother called the therapist after the family session and expressed that she is very concerned about the patient because she lied in the family session. She said that the patient willingly gave the father of her children full custody. She believes that the patient did this in preparation of ending her own life. She was concerned about the patient not being honest during her treatment at the hospital. She also added that she is a \"master manipulator\". She also expressed that the patient has had at least 5 suicide attempts that she knows of. She said that her fist attempt was when she was 14 after she found out that she was pregnant. She said that most of them have been overdosing on medications, and one time she attempted to hang herself but the rope broke. She did not feel comfortable with the patient being discharged today for these reasons.

## 2021-06-01 NOTE — BH NOTES
Behavioral Health Treatment Team Note     Patient goal(s) for today: \"Get through the day and go home tomorrow. \"   Treatment team focus/goals: Medication management, group therapy    Progress note: Pt seen laying in bed, eyes puffy as though she had been crying. Pt stated that she was disappointed as she was hoping to leave today but is remaining hopeful that she will be able to leave possibly on tomorrow. Pt denied SI/HI/AVH, as well as anxiety and depression. Pt stated that her mood is \"optimistic. \"     LOS:  6  Expected LOS: 5-7 Days    Insurance info/prescription coverage:  [place] [employer]/[facility]  Date of last family contact:  6/1/2021  Family requesting physician contact today:  no  Discharge plan:  Therapist will collaborate with pt regarding discharge.   Guns in the home:  no   Outpatient provider(s):  None at this time    Participating treatment team members: Anni Lawson,  (assigned SW), Neida Stuart LMSW

## 2021-06-01 NOTE — BH NOTES
FAMILY SESSION     The therapist facilitated a family session between the patient and her mother. The patient was presenting with a bright affect and congruent mood. Her thoughts and speech were future oriented. She spoke about the stress that brought her to the hospital, being that the father of her children got full custody over them. She said that she has learned a lot since being here, and that attending groups has helped her a lot. She was able to identify her warnings signs appropriately stating that she has crying spells, becomes irritable, and sleeps more often when feeling depressed. The therapist encouraged her to consider how she may feel when returning home in regards to the father of her children still having custody of them. She said that she hopes that her attending her outpatient appointments, and working on bettering herself will be an indication to the courts that she is able to be involved in their lives again, at least if it is for partial custody/visitation. She said that she would like to attend [facility]'s.

## 2021-06-01 NOTE — BH NOTES
Patient is alert and oriented x 4. She denies any SI/HI/AH/VH. Patient denies any anxiety or depression. Broad affect and calm mood. Patient seen socializing in dayroom with peers and watching TV. Staff will continue to monitor patient for safety.

## 2021-06-01 NOTE — GROUP NOTE
[facility] GROUP DOCUMENTATION INDIVIDUAL                                                                          Group Therapy Note    Date: 6/1/2021    Group Start Time: 1500  Group End Time: 8468  Group Topic: Process Group - Inpatient    SRM 2  NON ACUTE    Osval[address] Castillo [address] [facility] GROUP DOCUMENTATION GROUP    Group Therapy Note    Attendees: 6 out of 11    Patients were invited to group and encouraged to discuss their thoughts, feelings and emotions openly in the group setting. Patients were asked to give their goal for the day. Lastly, patients were asked to listen respectfully to and be supportive of their peers. Attendance: Did not attend    Patient's Goal:  N/A    Interventions/techniques: Other N/A    Follows Directions: Other N/A    Interactions: Other N/A    Mental Status: Other N/A    Behavior/appearance: N/A    Goals Achieved: N/A      Additional Notes:  Pt did not attend group despite having been encouraged to do so.     [place]

## 2021-06-01 NOTE — SUICIDE SAFETY PLAN
SAFETY PLAN    A suicide Safety Plan is a document that supports someone when they are having thoughts of suicide. Warning Signs that indicate a suicidal crisis may be developing: What (situations, thoughts, feelings, body sensations, behaviors, etc.) do you experience that lets you know you are beginning to think about suicide? 1. Crying  2. Pacing  3. Chain smoking    Internal Coping Strategies:  What things can I do (relaxation techniques, hobbies, physical activities, etc.) to take my mind off my problems without contacting another person? 1. Taking a walk  2. Listening to music  3. Writing in [employer]    People and social settings that provide distraction: Who can I call or where can I go to distract me? 1. Name: She Zeinab  Phone: number in phone  2. Name: Leanne Chen  Phone: number in phone   3. Place: [address] [place] [address] St:     People whom I can ask for help: Who can I call when I need help - for example, friends, family, clergy, someone else? 1. Name: Paola Valdez                Phone: number in phone  2. Name: Angi Singh  Phone: number in phone  3. Name: Simon Gil  Phone: number in phone    Professionals or [address] [place], [place] [address]vd I can contact during a crisis: Who can I call for help - for example, my doctor, my psychiatrist, my psychologist, a mental health provider, a suicide hotline? 1. Clinician Name: [facility]    Phone: [phone]      Clinician Pager or Emergency Contact #:     2. Clinician Name: Yves Best   Phone: [phone]      Clinician Pager or Emergency Contact #:     3. Suicide Prevention Lifeline: 6-194-567-TALK (8552)    4.  [address] [place] [address] Emergency Services -  for example, [facility]: [address] [facility]      Emergency Services Address: [facility],  [place], [place], [address] [facility]      Emergency Services Phone: [phone]    Making the environment safe: How can I make my environment (house/apartment/living space) safer? For example, can I remove guns, medications, and other items? 1. Let mother relay meds   2.  Communicate

## 2021-06-01 NOTE — GROUP NOTE
IP  GROUP DOCUMENTATION INDIVIDUAL                                                                          Group Therapy Note    Date: 6/1/2021    Group Start Time: 1100  Group End Time: 1200  Group Topic: Education Group - Inpatient    SRM 2 BH NON ACUTE    Michael Rascon    IP  GROUP DOCUMENTATION GROUP    Group Therapy Note    Facilitated discussion focused on defining different cognitive distortions and giving examples to help recognize how they have engaged in some of them    Attendees: 12/13         Attendance: Attended    Patient's Goal:  *STG: Attends activities and groups        Interventions/techniques: Informed and Supported    Follows Directions: Followed directions    Interactions: Interacted appropriately    Mental Status: Calm    Behavior/appearance: Cooperative    Goals Achieved: Able to engage in interactions and Able to listen to others      Additional Notes:  Receptive to information and engaged in discussion. Pt reported \"prior to admission she was thinking negative and was engaging in all or nothing thinking and overgeneralization relating to not having her children.  Pt reported Gia Bruce is planning to get a place so her children can have their own room\"    Jagdish Syriac

## 2021-06-01 NOTE — BH NOTES
DISCHARGE PLANNING    The therapist spoke to the patient's doctor about the concerns the mother has relayed about the patient. He told the therapist to encourage the patient to stay until tomorrow, and that if she was not willing to do so to get D-19 involved. The therapist met with the patient and spoke to her about her mother's concerns about her not being truthful. The patient denied what her mother had said, and said that it was not a willing decision to give up her children. The therapist offered to call her mother together again to clarify things, although the patient declined. She became tearful although was willing to stay another day.

## 2021-06-01 NOTE — BH NOTES
COLLATERAL CONTACT    The therapist spoke with the patient's mother over the phone. She said that she has been speaking with the patient daily since she has been admitted. She said that she sounds like herself again over the phone. She said that she was very flat prior to coming to the hospital. The therapist provided her with an update regarding the patient's bright affect, and how she is socializing more with peers. She was willing to have a quick family session at 13:30 prior to the patient leaving. She expressed some guilt over not acting sooner when noticing the warning signs. The therapist said that safety planning is addressed in the family sessions so that the patient's can become more self-aware of their own warning signs and how to ask for help.

## 2021-06-02 VITALS
DIASTOLIC BLOOD PRESSURE: 78 MMHG | SYSTOLIC BLOOD PRESSURE: 117 MMHG | TEMPERATURE: 99 F | RESPIRATION RATE: 18 BRPM | OXYGEN SATURATION: 100 % | HEART RATE: 76 BPM

## 2021-06-02 PROCEDURE — 74011250637 HC RX REV CODE- 250/637: Performed by: PSYCHIATRY & NEUROLOGY

## 2021-06-02 RX ADMIN — ESCITALOPRAM OXALATE 20 MG: 10 TABLET ORAL at 08:56

## 2021-06-02 RX ADMIN — LEVETIRACETAM 1000 MG: 500 TABLET, FILM COATED ORAL at 08:56

## 2021-06-02 RX ADMIN — LACOSAMIDE 100 MG: 50 TABLET, FILM COATED ORAL at 08:56

## 2021-06-02 RX ADMIN — HYDROCORTISONE: 1 LOTION TOPICAL at 08:58

## 2021-06-02 RX ADMIN — ARIPIPRAZOLE 5 MG: 5 TABLET ORAL at 08:56

## 2021-06-02 NOTE — BH NOTES
[facility] Shift Note:    Patient was alert and oriented times 4 at the onset of the shift. She was watching TV and associating well with her peers in the dayroom. She appeared well kempt and was smiling. She denied active thoughts to harm herself or others and talked about her plans for the future upon discharge. Denied pain or discomfort. Though patient stated that she was craving cigarettes. Refuses order for nicotine patch. Staff will continue to monitor on every 15 minute checks. In bed with eyes closed at this time with even and unlabored breathing.

## 2021-06-02 NOTE — BH NOTES
Patient given her personal belongings and discharge instructions, patient verbalized understanding and left unit ambulatory to [employer] cab.

## 2021-06-02 NOTE — PROGRESS NOTES
Problem: Suicide  Goal: *STG: Remains safe in hospital  Outcome: Not Progressing Towards Goal  Goal: *STG: Attends activities and groups  Outcome: Not Progressing Towards Goal

## 2021-06-02 NOTE — BH NOTES
Patient case discussed in the treatment team at the patient doing well mood better energy better and reflected upon why she did have impulsive but was how dangerous it was. A family session was held with the mom mom later shared with the  that patient is not ready manipulative putting. Per mom that is the patient given the children away to children's father she suspect because that she is done it so that she will not around when she kill herself and that she will need to be taken care. Patient did not give that the impression to me patient is eager to go go home and go back to work here in [place] where she was been working for the last 2 months and which she gives her income to keep the children with her the children are right now with the mother apparently the legal document says patient can have the children are doing the weekend and the holidays.   Her vital signs today temperature 98.9 pulse 84 blood pressure 109/76 respiration 18 we will reassess the case labs reviewed no new labs resulted thank you continued inpatient level of care indicated we will try to get collateral information from the children's father other family member

## 2021-06-02 NOTE — GROUP NOTE
IP  GROUP DOCUMENTATION INDIVIDUAL                                                                          Group Therapy Note    Date: 6/1/2021    Group Start Time: 1900  Group End Time: 2000  Group Topic: Recreational/Music Therapy    SRM 2  NON ACUTE    Trevor Byrd    IP 1[address] [facility] GROUP DOCUMENTATION GROUP    Group Therapy Note    Attendees: 6/10  Introduced healthy leisure task as positive way to cope and manage mood. Attendance: Attended    Patient's Goal:  STG: Attends activities and groups      Interventions/techniques: Art integration, Provide feedback and Supported    Follows Directions: Followed directions    Interactions: Interacted appropriately    Mental Status: Calm and Flat    Behavior/appearance: Attentive and Cooperative    Goals Achieved: Able to engage in interactions and Able to listen to others    Additional Notes: Attended group and actively participated. Received leisure packet and discussed benefits of leisure in improving mood, and as way to improve self care. Was receptive to intervention and verbalized enjoyment. Selected songs to listen to in group and worked on puzzles and art task. Interacted with staff and peers.     Consuelo Brown

## 2021-06-02 NOTE — GROUP NOTE
[facility] GROUP DOCUMENTATION INDIVIDUAL                                                                          Group Therapy Note    Date: 6/2/2021    Group Start Time: 1300  Group End Time: 1350  Group Topic: Process Group - Inpatient    SRM 2  NON ACUTE    Osval[address] Castillo [address] [facility] GROUP DOCUMENTATION GROUP    Group Therapy Note    Attendees: 5 out of 9    Patients were invited to group and encouraged to share their thoughts, feelings, and emotions. A conversation ball was used in order to further facilitate discussion within the group. Patients were asked to answer whatever question their thumb landed on after catching the ball, from there each member of the group was also asked to comment on the question that their peer had received. Patients were also asked to listen respectfully to and be supportive of their peers. Attendance: Did not attend    Patient's Goal:  N/A    Interventions/techniques: Other N/A    Follows Directions: Other N/A    Interactions: Other N/A    Mental Status: Other N/A    Behavior/appearance: N/A    Goals Achieved: N/A      Additional Notes:  Pt did not attend group despite having been encouraged to do so.      [place]

## 2021-06-02 NOTE — SUICIDE SAFETY PLAN
SAFETY PLAN    A suicide Safety Plan is a document that supports someone when they are having thoughts of suicide. Warning Signs that indicate a suicidal crisis may be developing: What (situations, thoughts, feelings, body sensations, behaviors, etc.) do you experience that lets you know you are beginning to think about suicide? 1. Crying  2. sleeping  3. Yelling and cussing    Internal Coping Strategies:  What things can I do (relaxation techniques, hobbies, physical activities, etc.) to take my mind off my problems without contacting another person? 1. Listening to music   2. Taking a walk  3. Writing things down    People and social settings that provide distraction: Who can I call or where can I go to distract me? 1. Name: Mick Mccarthy  Phone: [phone]  2. Name: Rolando Mixonu  Phone: number in phone   3. Place: [address] [place] [address] Street: [address] [place] [address] whom I can ask for help: Who can I call when I need help - for example, friends, family, clergy, someone else? 1. Name: Whit Vuong                Phone: [phone]  2. Name: Yury Hooker   Phone: [phone]  3. Name: Dax Lopez  Phone: number in phone    Professionals or [address] [facility] I can contact during a crisis: Who can I call for help - for example, my doctor, my psychiatrist, my psychologist, a mental health provider, a suicide hotline? 1. Clinician Name: [facility]   Phone: [phone]      Clinician Pager or Emergency Contact #:     2. Clinician Name: Jessica Barron   Phone: [phone]      Clinician Pager or Emergency Contact #:     3. Suicide Prevention Lifeline: 9-456-691-TALK (5220)    4.  [address] [place] [address] Emergency Services -  for example, [facility] suicide hotline, [facility] Hotline: [address] [facility]      Emergency Services Address: [facility],  Delma Servin, [address] [facility]      Emergency Services Phone: [phone]    Making the environment safe: How can I make my environment (house/apartment/living space) safer? For example, can I remove guns, medications, and other items? 1. Have mother relay meds  2.  Communicate feelings

## 2021-06-02 NOTE — BH NOTES
Patient pleasant upon approach, affect broad, social with peers. Patient was medication compliant. She denied SI, HI, AH, VH, depression and anxiety. Patient anticipating discharge today. She remains on close observation, Q 15 minute checks.
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
## 2022-03-07 ENCOUNTER — NURSE TRIAGE (OUTPATIENT)
Dept: OTHER | Facility: CLINIC | Age: 27
End: 2022-03-07

## 2022-03-07 NOTE — TELEPHONE ENCOUNTER
Received call from Paulino Villegas at [facility] with [employer]. Subjective: Caller states \"abdominal pain\"     Current Symptoms: worse after eating and got worse yesterday, only ate a salad yesterday. Had some bloating. Blood in stool x1 last week, no staining or hemorrhoids. Has nausea off/on, no vomiting. No blood urine or urinary symptoms. Feels like she has lost 7 pounds in the past 2 weeks. Onset: 2 weeks ago; worsening    Associated Symptoms: NA    Pain Severity: 6/10; cramping; constant for the past 1/2 hour    Temperature: no fever by unknown method    What has been tried: zofran-her mom's rx. Pepto    LMP: 2/21/22 Pregnant: No    Recommended disposition: See in Office Today    Care advice provided, patient verbalizes understanding; denies any other questions or concerns; instructed to call back for any new or worsening symptoms. message in chat to     Attention Provider: Thank you for allowing me to participate in the care of your patient. The patient was connected to triage in response to information provided to the [facility]. Please do not respond through this encounter as the response is not directed to a shared pool.         Reason for Disposition   Patient wants to be seen    Protocols used: ABDOMINAL PAIN - [facility] - KHANG KELLEY

## 2022-03-18 PROBLEM — O46.90 ANTEPARTUM HEMORRHAGE: Status: ACTIVE | Noted: 2021-01-12

## 2022-03-18 PROBLEM — T50.902A DRUG OVERDOSE, INTENTIONAL (HCC): Status: ACTIVE | Noted: 2021-05-25

## 2022-03-18 PROBLEM — D64.9 ANEMIA: Status: ACTIVE | Noted: 2019-11-13

## 2022-03-18 PROBLEM — K29.70 GASTRITIS: Status: ACTIVE | Noted: 2021-01-12

## 2022-03-19 PROBLEM — G40.909 SEIZURE DISORDER (HCC): Status: ACTIVE | Noted: 2017-05-19

## 2022-03-19 PROBLEM — F31.32 MODERATE DEPRESSED BIPOLAR I DISORDER (HCC): Status: ACTIVE | Noted: 2019-11-07

## 2022-03-19 PROBLEM — F33.2 DEPRESSION, MAJOR, SEVERE RECURRENCE (HCC): Status: ACTIVE | Noted: 2021-05-27

## 2022-03-19 PROBLEM — J45.20 MILD INTERMITTENT ASTHMA: Status: ACTIVE | Noted: 2017-05-19

## 2022-03-20 PROBLEM — T50.901A DRUG OVERDOSE: Status: ACTIVE | Noted: 2021-05-24

## 2022-03-20 PROBLEM — F32.9 MAJOR DEPRESSION: Status: ACTIVE | Noted: 2021-05-27

## 2022-03-20 PROBLEM — E78.5 HYPERLIPIDEMIA: Status: ACTIVE | Noted: 2017-05-19

## 2022-07-30 ENCOUNTER — HOSPITAL ENCOUNTER (EMERGENCY)
Age: 27
Discharge: HOME | End: 2022-07-30
Attending: STUDENT IN AN ORGANIZED HEALTH CARE EDUCATION/TRAINING PROGRAM
Payer: MEDICAID

## 2022-07-30 VITALS
DIASTOLIC BLOOD PRESSURE: 49 MMHG | WEIGHT: 195 LBS | OXYGEN SATURATION: 96 % | RESPIRATION RATE: 25 BRPM | TEMPERATURE: 98.3 F | HEIGHT: 64 IN | BODY MASS INDEX: 33.29 KG/M2 | SYSTOLIC BLOOD PRESSURE: 110 MMHG | HEART RATE: 92 BPM

## 2022-07-30 DIAGNOSIS — R56.9 SEIZURE (HCC): Primary | ICD-10-CM

## 2022-07-30 LAB
ANION GAP SERPL CALC-SCNC: 9 MMOL/L (ref 5–15)
BASOPHILS # BLD: 0.1 K/UL (ref 0–0.1)
BASOPHILS NFR BLD: 1 % (ref 0–1)
BUN SERPL-MCNC: 6 MG/DL (ref 6–20)
BUN/CREAT SERPL: 18 (ref 12–20)
CA-I BLD-MCNC: 9.2 MG/DL (ref 8.5–10.1)
CHLORIDE SERPL-SCNC: 106 MMOL/L (ref 97–108)
CO2 SERPL-SCNC: 20 MMOL/L (ref 21–32)
CREAT SERPL-MCNC: 0.34 MG/DL (ref 0.55–1.02)
DIFFERENTIAL METHOD BLD: ABNORMAL
EOSINOPHIL # BLD: 0.1 K/UL (ref 0–0.4)
EOSINOPHIL NFR BLD: 1 % (ref 0–7)
ERYTHROCYTE [DISTWIDTH] IN BLOOD BY AUTOMATED COUNT: 15.7 % (ref 11.5–14.5)
GLUCOSE SERPL-MCNC: 87 MG/DL (ref 65–100)
HCT VFR BLD AUTO: 34.2 % (ref 35–47)
HGB BLD-MCNC: 11.2 G/DL (ref 11.5–16)
IMM GRANULOCYTES # BLD AUTO: 0 K/UL (ref 0–0.04)
IMM GRANULOCYTES NFR BLD AUTO: 0 % (ref 0–0.5)
LYMPHOCYTES # BLD: 1.9 K/UL (ref 0.8–3.5)
LYMPHOCYTES NFR BLD: 28 % (ref 12–49)
MCH RBC QN AUTO: 26.5 PG (ref 26–34)
MCHC RBC AUTO-ENTMCNC: 32.7 G/DL (ref 30–36.5)
MCV RBC AUTO: 80.9 FL (ref 80–99)
MONOCYTES # BLD: 0.5 K/UL (ref 0–1)
MONOCYTES NFR BLD: 7 % (ref 5–13)
NEUTS SEG # BLD: 4.2 K/UL (ref 1.8–8)
NEUTS SEG NFR BLD: 63 % (ref 32–75)
NRBC # BLD: 0 K/UL (ref 0–0.01)
NRBC BLD-RTO: 0 PER 100 WBC
PLATELET # BLD AUTO: 289 K/UL (ref 150–400)
PMV BLD AUTO: 11.2 FL (ref 8.9–12.9)
POTASSIUM SERPL-SCNC: 3.8 MMOL/L (ref 3.5–5.1)
RBC # BLD AUTO: 4.23 M/UL (ref 3.8–5.2)
SODIUM SERPL-SCNC: 135 MMOL/L (ref 136–145)
WBC # BLD AUTO: 6.7 K/UL (ref 3.6–11)

## 2022-07-30 PROCEDURE — 99283 EMERGENCY DEPT VISIT LOW MDM: CPT

## 2022-07-30 PROCEDURE — 85025 COMPLETE CBC W/AUTO DIFF WBC: CPT

## 2022-07-30 PROCEDURE — 80048 BASIC METABOLIC PNL TOTAL CA: CPT

## 2022-07-30 PROCEDURE — 36415 COLL VENOUS BLD VENIPUNCTURE: CPT

## 2022-07-30 RX ORDER — ACETAMINOPHEN 325 MG/1
650 TABLET ORAL
Status: DISCONTINUED | OUTPATIENT
Start: 2022-07-30 | End: 2022-07-30 | Stop reason: HOSPADM

## 2022-07-30 NOTE — ED PROVIDER NOTES
EMERGENCY DEPARTMENT HISTORY AND PHYSICAL EXAM      Date: 7/30/2022  Patient Name: Tommy Angulo    History of Presenting Illness     Chief Complaint   Patient presents with   [facility]    Seizure       History Provided By: Patient    HPI: Tommy Angulo, 32 y.o. female with a past medical history significant seizure presents to the ED with cc of seizure today. Patient states that she had a seizure in her sleep, fell off bed and hit head. Patient states she generally has breakthrough seizures in her sleep. Last breakthrough seizure was 1 year ago. Patient is compliant with Keppra and Vimpat. Patient states that she remembers going to sleep, and then remembers waking up in the ambulance. According to EMS and family patient had a seizure standing, struck her head on a dresser. She exhibited some mild postictal confusion on route. Currently patient awake alert oriented and complaining of some mild headache. Denies any weakness in any extremities. Of note patient is 6 weeks pregnant. Has not seen an OB for this pregnancy yet. Patient denies any abdominal pain, cramping, no vaginal discharge or bleeding. There are no other complaints, changes, or physical findings at this time. PCP: Yady Martines MD    No current facility-administered medications on file prior to encounter. Current Outpatient Medications on File Prior to Encounter   Medication Sig Dispense Refill    albuterol (PROVENTIL HFA, VENTOLIN HFA, PROAIR HFA) 90 mcg/actuation inhaler Take 2 Puffs by inhalation every four (4) hours as needed for Wheezing, Shortness of Breath or Respiratory Distress. 1 Inhaler 0    ARIPiprazole (ABILIFY) 5 mg tablet Take 1 Tablet by mouth daily. Indications: additional medications to treat depression 30 Tablet 0    escitalopram oxalate (LEXAPRO) 20 mg tablet Take 1 Tablet by mouth daily.  Indications: major depressive disorder 30 Tablet 0    lacosamide (VIMPAT) 100 mg tab tablet Take 1 Tablet by mouth two (2) times a day. Max Daily Amount: 200 mg. 60 Tablet 0    levETIRAcetam 1,000 mg tablet Take 1 Tablet by mouth two (2) times a day. Indications: additional medication for tonic-clonic epilepsy 60 Tablet 0    traZODone (DESYREL) 50 mg tablet Take 1 Tablet by mouth nightly as needed for Sleep. Indications: insomnia associated with depression 30 Tablet 0    guaiFENesin ER (MUCINEX) 600 mg ER tablet Take 1 Tablet by mouth two (2) times a day. 30 Tablet 0    venlafaxine-SR (EFFEXOR-XR) 150 mg capsule venlafaxine  mg capsule,extended release 24 hr   TK 1 C PO QD      ondansetron (ZOFRAN ODT) 8 mg disintegrating tablet Take 1 Tab by mouth every eight (8) hours as needed for Nausea. 30 Tab 2    omeprazole (PRILOSEC) 20 mg capsule omeprazole 20 mg capsule,delayed release (Patient not taking: Reported on 5/25/2021)      prenatal vit-calcium-iron-fa (PRENATAL PLUS, CALCIUM CARB,) 27 mg iron- 1 mg tab Take 1 Tab by mouth daily. Indications: PREGNANCY (Patient not taking: Reported on 5/25/2021)         Past History     Past Medical History:  Past Medical History:   Diagnosis Date    Anemia 11/13/2019    Asthma     [facility] ([facility]     Seizures ([facility]     last seizure two weeks ago       Past Surgical History:  Past Surgical History:   Procedure Laterality Date    HX OTHER SURGICAL Left 2008    Left eye cyst removal       Family History:  No family history on file. Social History:  Social History     Tobacco Use    Smoking status: Every Day     Packs/day: 5.00     Years: 2.00     Pack years: 10.00     Types: Cigarettes    Smokeless tobacco: Never   Substance Use Topics    Alcohol use: Yes    Drug use: No       Allergies: Allergies   Allergen Reactions    Citrus And Derivatives Hives    Lamictal [Lamotrigine] Nausea and Vomiting         Review of Systems     Review of Systems   Constitutional:  Negative for activity change, chills, fatigue and fever.    HENT:  Negative for congestion and trouble swallowing. Eyes:  Negative for photophobia and visual disturbance. Respiratory:  Negative for cough, chest tightness and shortness of breath. Cardiovascular:  Negative for chest pain and palpitations. Gastrointestinal:  Negative for abdominal distention, abdominal pain, diarrhea, nausea and vomiting. Genitourinary:  Negative for dysuria, flank pain and frequency. Musculoskeletal:  Negative for arthralgias, back pain and myalgias. Skin:  Negative for color change, pallor and rash. Neurological:  Positive for seizures and headaches. Negative for dizziness, tremors and weakness. Psychiatric/Behavioral:  Positive for confusion. Physical Exam     Physical Exam  Vitals and nursing note reviewed. Constitutional:       General: She is not in acute distress. Appearance: Normal appearance. She is normal weight. She is not ill-appearing. HENT:      Head: Normocephalic and atraumatic. Nose: Nose normal.      Mouth/Throat:      Mouth: Mucous membranes are moist.   Eyes:      Extraocular Movements: Extraocular movements intact. Pupils: Pupils are equal, round, and reactive to light. Cardiovascular:      Rate and Rhythm: Normal rate and regular rhythm. Pulses: Normal pulses. Heart sounds: Normal heart sounds. Pulmonary:      Effort: Pulmonary effort is normal.   Abdominal:      General: Abdomen is flat. Bowel sounds are normal.      Palpations: Abdomen is soft. Tenderness: There is no abdominal tenderness. There is no guarding. Musculoskeletal:         General: No tenderness. Normal range of motion. Cervical back: Normal range of motion and neck supple. No muscular tenderness. Skin:     General: Skin is warm and dry. Neurological:      General: No focal deficit present. Mental Status: She is alert and oriented to person, place, and time. Mental status is at baseline. Cranial Nerves: No cranial nerve deficit. Sensory: No sensory deficit. Motor: No weakness. Coordination: Coordination normal.       Diagnostic Study Results     Labs -     Recent Results (from the past 12 hour(s))   METABOLIC PANEL, BASIC    Collection Time: 07/30/22  5:05 AM   Result Value Ref Range    Sodium 135 (L) 136 - 145 mmol/L    Potassium 3.8 3.5 - 5.1 mmol/L    Chloride 106 97 - 108 mmol/L    CO2 20 (L) 21 - 32 mmol/L    Anion gap 9 5 - 15 mmol/L    Glucose 87 65 - 100 mg/dL    BUN 6 6 - 20 mg/dL    Creatinine 0.34 (L) 0.55 - 1.02 mg/dL    BUN/Creatinine ratio 18 12 - 20      GFR est AA >60 >60 ml/min/1.73m2    GFR est non-AA >60 >60 ml/min/1.73m2    Calcium 9.2 8.5 - 10.1 mg/dL   CBC WITH AUTOMATED DIFF    Collection Time: 07/30/22  5:05 AM   Result Value Ref Range    WBC 6.7 3.6 - 11.0 K/uL    RBC 4.23 3.80 - 5.20 M/uL    HGB 11.2 (L) 11.5 - 16.0 g/dL    HCT 34.2 (L) 35.0 - 47.0 %    MCV 80.9 80.0 - 99.0 FL    MCH 26.5 26.0 - 34.0 PG    MCHC 32.7 30.0 - 36.5 g/dL    RDW 15.7 (H) 11.5 - 14.5 %    PLATELET 574 304 - 321 K/uL    MPV 11.2 8.9 - 12.9 FL    NRBC 0.0 0.0  WBC    ABSOLUTE NRBC 0.00 0.00 - 0.01 K/uL    NEUTROPHILS 63 32 - 75 %    LYMPHOCYTES 28 12 - 49 %    MONOCYTES 7 5 - 13 %    EOSINOPHILS 1 0 - 7 %    BASOPHILS 1 0 - 1 %    IMMATURE GRANULOCYTES 0 0 - 0.5 %    ABS. NEUTROPHILS 4.2 1.8 - 8.0 K/UL    ABS. LYMPHOCYTES 1.9 0.8 - 3.5 K/UL    ABS. MONOCYTES 0.5 0.0 - 1.0 K/UL    ABS. EOSINOPHILS 0.1 0.0 - 0.4 K/UL    ABS. BASOPHILS 0.1 0.0 - 0.1 K/UL    ABS. IMM. GRANS. 0.0 0.00 - 0.04 K/UL    DF AUTOMATED         Radiologic Studies -   @lastxrresult@  CT Results  (Last 48 hours)      None          CXR Results  (Last 48 hours)      None              Medical Decision Making   I am the first provider for this patient. I reviewed the vital signs, available nursing notes, past medical history, past surgical history, family history and social history. Vital Signs-Reviewed the patient's vital signs.   Patient Vitals for the past 12 hrs:   Temp Pulse Resp BP SpO2   07/30/22 0512 -- 79 24 111/82 97 %   07/30/22 0457 -- 80 17 112/77 96 %   07/30/22 0427 -- 93 27 116/73 98 %   07/30/22 0405 -- -- -- -- 98 %   07/30/22 0357 98.3 °F (36.8 °C) 89 16 117/64 98 %       Records Reviewed: Nursing Notes    The patient presents with seizure with a differential diagnosis of breakthrough seizure, noncompliance, electrolyte abnormality, intracranial hemorrhage      Provider Notes (Medical Decision Making):     MDM     78-year-old female history of seizure disorder, taking Vimpat and Keppra, presents emergency department after having a seizure this evening. Patient reportedly hit her head on a dresser. Complaining of mild left-sided head pain, otherwise no complaints. Physical exam shows well-appearing female, no distress, stable vitals, HEENT exam shows no signs of significant scalp ecchymosis, swelling, signs of significant trauma, patient neurologically intact. Of note patient is 6 weeks pregnant, at this time do not feel the patient requires a head CT, will draw basic lab work, UA, observe patient. ED Course:   Initial assessment performed. The patients presenting problems have been discussed, and they are in agreement with the care plan formulated and outlined with them. I have encouraged them to ask questions as they arise throughout their visit. ED Course as of 07/30/22 0625   Sat Jul 30, 2022   0618 Patient's lab work resulting, metabolic panel shows no gross electrolyte abnormality, bicarb slightly low at 20, CBC shows no leukocytosis stable H&H. Patient has not provided urine sample yet urinalysis. Patient continues to remain symptom-free, [PZ]   [id number] Reassessed patient asleep, easily arousable. Discussed results with patient. At this time most likely patient had a breakthrough seizure. Will discharge patient home, instructed to follow-up with primary care physician or neurologist within the next week.   Return to emergency department for any worsening seizures weakness dizziness. Patient understands is amenable with discharge. [PZ]      ED Course User Index  [PZ] Diego Calles MD         PROCEDURES  Procedures         PLAN:  1. Current Discharge Medication List        2. Follow-up Information       Follow up With Specialties Details Why Contact Info    Tiffani Strange MD Family Medicine In 3 days  [address] [address] EMERGENCY DEPT Emergency Medicine  If symptoms worsen [address] [address]  [phone]          Return to ED if worse     Diagnosis     Clinical Impression:   1.  Seizure ([address]

## 2022-07-30 NOTE — ED TRIAGE NOTES
Pt w hx of sz, was standing when she had a sz and fell striking head on dresser. Sz lasted approx 2 min per ems. Pt postictal when ems arrived, arrives to ED a/o, complaint of being \"tired\".

## 2022-08-07 ENCOUNTER — HOSPITAL ENCOUNTER (EMERGENCY)
Age: 27
Discharge: HOME | End: 2022-08-07
Attending: EMERGENCY MEDICINE
Payer: MEDICAID

## 2022-08-07 VITALS
RESPIRATION RATE: 21 BRPM | HEART RATE: 70 BPM | DIASTOLIC BLOOD PRESSURE: 82 MMHG | SYSTOLIC BLOOD PRESSURE: 115 MMHG | WEIGHT: 190 LBS | HEIGHT: 64 IN | OXYGEN SATURATION: 100 % | TEMPERATURE: 97.9 F | BODY MASS INDEX: 32.44 KG/M2

## 2022-08-07 DIAGNOSIS — G40.909 SEIZURE DISORDER (HCC): Primary | ICD-10-CM

## 2022-08-07 LAB
ALBUMIN SERPL-MCNC: 3.2 G/DL (ref 3.5–5)
ALBUMIN/GLOB SERPL: 0.9 {RATIO} (ref 1.1–2.2)
ALP SERPL-CCNC: 54 U/L (ref 45–117)
ALT SERPL-CCNC: 19 U/L (ref 12–78)
ANION GAP SERPL CALC-SCNC: 6 MMOL/L (ref 5–15)
AST SERPL W P-5'-P-CCNC: 26 U/L (ref 15–37)
ATRIAL RATE: 71 BPM
BASOPHILS # BLD: 0.1 K/UL (ref 0–0.1)
BASOPHILS NFR BLD: 1 % (ref 0–1)
BILIRUB SERPL-MCNC: 0.3 MG/DL (ref 0.2–1)
BUN SERPL-MCNC: 5 MG/DL (ref 6–20)
BUN/CREAT SERPL: 10 (ref 12–20)
CA-I BLD-MCNC: 9.1 MG/DL (ref 8.5–10.1)
CALCULATED P AXIS, ECG09: 31 DEGREES
CALCULATED R AXIS, ECG10: 30 DEGREES
CALCULATED T AXIS, ECG11: 20 DEGREES
CHLORIDE SERPL-SCNC: 106 MMOL/L (ref 97–108)
CO2 SERPL-SCNC: 22 MMOL/L (ref 21–32)
CREAT SERPL-MCNC: 0.49 MG/DL (ref 0.55–1.02)
DIAGNOSIS, 93000: NORMAL
DIFFERENTIAL METHOD BLD: ABNORMAL
EOSINOPHIL # BLD: 0 K/UL (ref 0–0.4)
EOSINOPHIL NFR BLD: 0 % (ref 0–7)
ERYTHROCYTE [DISTWIDTH] IN BLOOD BY AUTOMATED COUNT: 15.3 % (ref 11.5–14.5)
GLOBULIN SER CALC-MCNC: 3.6 G/DL (ref 2–4)
GLUCOSE SERPL-MCNC: 83 MG/DL (ref 65–100)
HCT VFR BLD AUTO: 33.6 % (ref 35–47)
HGB BLD-MCNC: 11 G/DL (ref 11.5–16)
IMM GRANULOCYTES # BLD AUTO: 0 K/UL (ref 0–0.04)
IMM GRANULOCYTES NFR BLD AUTO: 0 % (ref 0–0.5)
LYMPHOCYTES # BLD: 1.9 K/UL (ref 0.8–3.5)
LYMPHOCYTES NFR BLD: 35 % (ref 12–49)
MCH RBC QN AUTO: 26.8 PG (ref 26–34)
MCHC RBC AUTO-ENTMCNC: 32.7 G/DL (ref 30–36.5)
MCV RBC AUTO: 82 FL (ref 80–99)
MONOCYTES # BLD: 0.4 K/UL (ref 0–1)
MONOCYTES NFR BLD: 8 % (ref 5–13)
NEUTS SEG # BLD: 3.1 K/UL (ref 1.8–8)
NEUTS SEG NFR BLD: 56 % (ref 32–75)
NRBC # BLD: 0 K/UL (ref 0–0.01)
NRBC BLD-RTO: 0 PER 100 WBC
P-R INTERVAL, ECG05: 148 MS
PLATELET # BLD AUTO: 294 K/UL (ref 150–400)
PMV BLD AUTO: 11.8 FL (ref 8.9–12.9)
POTASSIUM SERPL-SCNC: 4.6 MMOL/L (ref 3.5–5.1)
PROT SERPL-MCNC: 6.8 G/DL (ref 6.4–8.2)
Q-T INTERVAL, ECG07: 360 MS
QRS DURATION, ECG06: 76 MS
QTC CALCULATION (BEZET), ECG08: 391 MS
RBC # BLD AUTO: 4.1 M/UL (ref 3.8–5.2)
SODIUM SERPL-SCNC: 134 MMOL/L (ref 136–145)
VENTRICULAR RATE, ECG03: 71 BPM
WBC # BLD AUTO: 5.6 K/UL (ref 3.6–11)

## 2022-08-07 PROCEDURE — 93005 ELECTROCARDIOGRAM TRACING: CPT

## 2022-08-07 PROCEDURE — 80235 DRUG ASSAY LACOSAMIDE: CPT

## 2022-08-07 PROCEDURE — 99284 EMERGENCY DEPT VISIT MOD MDM: CPT

## 2022-08-07 PROCEDURE — 80177 DRUG SCRN QUAN LEVETIRACETAM: CPT

## 2022-08-07 PROCEDURE — 36415 COLL VENOUS BLD VENIPUNCTURE: CPT

## 2022-08-07 PROCEDURE — 80053 COMPREHEN METABOLIC PANEL: CPT

## 2022-08-07 PROCEDURE — 85025 COMPLETE CBC W/AUTO DIFF WBC: CPT

## 2022-08-07 NOTE — DISCHARGE INSTRUCTIONS
Thank you! Thank you for allowing me to care for you in the emergency department. I sincerely hope that you are satisfied with your visit today. It is my goal to provide you with excellent care. Below you will find a list of your labs and imaging from your visit today. Should you have any questions regarding these results please do not hesitate to call the emergency department. Labs -     Recent Results (from the past 12 hour(s))   CBC WITH AUTOMATED DIFF    Collection Time: 08/07/22  9:20 AM   Result Value Ref Range    WBC 5.6 3.6 - 11.0 K/uL    RBC 4.10 3.80 - 5.20 M/uL    HGB 11.0 (L) 11.5 - 16.0 g/dL    HCT 33.6 (L) 35.0 - 47.0 %    MCV 82.0 80.0 - 99.0 FL    MCH 26.8 26.0 - 34.0 PG    MCHC 32.7 30.0 - 36.5 g/dL    RDW 15.3 (H) 11.5 - 14.5 %    PLATELET 519 003 - 089 K/uL    MPV 11.8 8.9 - 12.9 FL    NRBC 0.0 0.0  WBC    ABSOLUTE NRBC 0.00 0.00 - 0.01 K/uL    NEUTROPHILS 56 32 - 75 %    LYMPHOCYTES 35 12 - 49 %    MONOCYTES 8 5 - 13 %    EOSINOPHILS 0 0 - 7 %    BASOPHILS 1 0 - 1 %    IMMATURE GRANULOCYTES 0 0 - 0.5 %    ABS. NEUTROPHILS 3.1 1.8 - 8.0 K/UL    ABS. LYMPHOCYTES 1.9 0.8 - 3.5 K/UL    ABS. MONOCYTES 0.4 0.0 - 1.0 K/UL    ABS. EOSINOPHILS 0.0 0.0 - 0.4 K/UL    ABS. BASOPHILS 0.1 0.0 - 0.1 K/UL    ABS. IMM. GRANS. 0.0 0.00 - 0.04 K/UL    DF AUTOMATED     METABOLIC PANEL, COMPREHENSIVE    Collection Time: 08/07/22  9:20 AM   Result Value Ref Range    Sodium 134 (L) 136 - 145 mmol/L    Potassium 4.6 3.5 - 5.1 mmol/L    Chloride 106 97 - 108 mmol/L    CO2 22 21 - 32 mmol/L    Anion gap 6 5 - 15 mmol/L    Glucose 83 65 - 100 mg/dL    BUN 5 (L) 6 - 20 mg/dL    Creatinine 0.49 (L) 0.55 - 1.02 mg/dL    BUN/Creatinine ratio 10 (L) 12 - 20      GFR est AA >60 >60 ml/min/1.73m2    GFR est non-AA >60 >60 ml/min/1.73m2    Calcium 9.1 8.5 - 10.1 mg/dL    Bilirubin, total 0.3 0.2 - 1.0 mg/dL    AST (SGOT) 26 15 - 37 U/L    ALT (SGPT) 19 12 - 78 U/L    Alk.  phosphatase 54 45 - 117 U/L    Protein, total 6.8 6.4 - 8.2 g/dL    Albumin 3.2 (L) 3.5 - 5.0 g/dL    Globulin 3.6 2.0 - 4.0 g/dL    A-G Ratio 0.9 (L) 1.1 - 2.2         Radiologic Studies -   No orders to display     CT Results  (Last 48 hours)      None          CXR Results  (Last 48 hours)      None               If you feel that you have not received excellent quality care or timely care, please ask to speak to the nurse manager. Please choose us in the future for your continued health care needs. ------------------------------------------------------------------------------------------------------------  The exam and treatment you received in the Emergency Department were for an urgent problem and are not intended as complete care. It is important that you follow-up with a doctor, nurse practitioner, or physician assistant to:  (1) confirm your diagnosis,  (2) re-evaluation of changes in your illness and treatment, and  (3) for ongoing care. If your symptoms become worse or you do not improve as expected and you are unable to reach your usual health care provider, you should return to the Emergency Department. We are available 24 hours a day. Please take your discharge instructions with you when you go to your follow-up appointment. If you have any problem arranging a follow-up appointment, contact the Emergency Department immediately. If a prescription has been provided, please have it filled as soon as possible to prevent a delay in treatment. Read the entire medication instruction sheet provided to you by the pharmacy. If you have any questions or reservations about taking the medication due to side effects or interactions with other medications, please call your primary care physician or contact the ER to speak with the charge nurse. Make an appointment with your family doctor or the physician you were referred to for follow-up of this visit as instructed on your discharge paperwork, as this is a mandatory follow-up.  Return to the ER if you are unable to be seen or if you are unable to be seen in a timely manner. If you have any problem arranging the follow-up visit, contact the Emergency Department immediately.

## 2022-08-07 NOTE — ED PROVIDER NOTES
EMERGENCY DEPARTMENT HISTORY AND PHYSICAL EXAM      Date: 2022  Patient Name: Jarod Spears      History of Presenting Illness     Chief Complaint   Patient presents with    Seizure       History Provided By: Patient    HPI: Jarod Spears, 32 y.o. female with a past medical history significant asthma and seizure presents to the ED with cc of seizure. Patient has a history of seizures. In previous pregnancies she has had significant increase in her seizure burden. She is currently approximately 8 weeks pregnant and has had increased seizure activity with this pregnancy as well. Today she had 4 short\seizures lasting a total of 3 minutes of seizure activity when combined per estimates per EMS. Family gave sublingual Ativan with resolution. Here patient does not recall the event and is alert and oriented. She reports her seizure medicine was increased last week and she has been compliant with the new dosing. Previously she has had elective  due to increased seizures during pregnancy. She also plans to have an  tomorrow due to increased seizures. He is followed by [facility] for seizures and OB/GYN care. There are no other complaints, changes, or physical findings at this time. PCP: Rajiv Villa MD    Current Outpatient Medications   Medication Sig Dispense Refill    albuterol (PROVENTIL HFA, VENTOLIN HFA, PROAIR HFA) 90 mcg/actuation inhaler Take 2 Puffs by inhalation every four (4) hours as needed for Wheezing, Shortness of Breath or Respiratory Distress. 1 Inhaler 0    ARIPiprazole (ABILIFY) 5 mg tablet Take 1 Tablet by mouth daily. Indications: additional medications to treat depression 30 Tablet 0    escitalopram oxalate (LEXAPRO) 20 mg tablet Take 1 Tablet by mouth daily. Indications: major depressive disorder 30 Tablet 0    lacosamide (VIMPAT) 100 mg tab tablet Take 1 Tablet by mouth two (2) times a day.  Max Daily Amount: 200 mg. 60 Tablet 0    levETIRAcetam 1,000 mg tablet Take 1 Tablet by mouth two (2) times a day. Indications: additional medication for tonic-clonic epilepsy 60 Tablet 0    traZODone (DESYREL) 50 mg tablet Take 1 Tablet by mouth nightly as needed for Sleep. Indications: insomnia associated with depression 30 Tablet 0    guaiFENesin ER (MUCINEX) 600 mg ER tablet Take 1 Tablet by mouth two (2) times a day. 30 Tablet 0    venlafaxine-SR (EFFEXOR-XR) 150 mg capsule venlafaxine  mg capsule,extended release 24 hr   TK 1 C PO QD      ondansetron (ZOFRAN ODT) 8 mg disintegrating tablet Take 1 Tab by mouth every eight (8) hours as needed for Nausea. 30 Tab 2    omeprazole (PRILOSEC) 20 mg capsule omeprazole 20 mg capsule,delayed release (Patient not taking: Reported on 5/25/2021)      prenatal vit-calcium-iron-fa (PRENATAL PLUS, CALCIUM CARB,) 27 mg iron- 1 mg tab Take 1 Tab by mouth daily. Indications: PREGNANCY (Patient not taking: Reported on 5/25/2021)         Past History     Past Medical History:  Past Medical History:   Diagnosis Date    Anemia 11/13/2019    Asthma     Epile[facility] ([facility] [address]     Seizures ([facility] [address]     last seizure two weeks ago       Past Surgical History:  Past Surgical History:   Procedure Laterality Date    HX OTHER SURGICAL Left 2008    Left eye cyst removal       Family History:  No family history on file. Social History:  Social History     Tobacco Use    Smoking status: Every Day     Packs/day: 5.00     Years: 2.00     Pack years: 10.00     Types: Cigarettes    Smokeless tobacco: Never   Substance Use Topics    Alcohol use: Yes    Drug use: No       Allergies: Allergies   Allergen Reactions    Citrus And Derivatives Hives    Lamictal [Lamotrigine] Nausea and Vomiting         Review of Systems     Review of Systems   Constitutional:  Negative for activity change and fever. HENT:  Negative for rhinorrhea and sore throat. Eyes:  Negative for visual disturbance. Respiratory:  Negative for cough.     Cardiovascular: Negative for chest pain. Gastrointestinal:  Negative for abdominal pain, diarrhea, nausea and vomiting. Genitourinary:  Negative for dysuria. Musculoskeletal:  Negative for arthralgias and myalgias. Skin:  Negative for rash and wound. Neurological:  Positive for seizures. Negative for syncope and headaches. Psychiatric/Behavioral:  Negative for confusion. All other systems reviewed and are negative. Physical Exam     Physical Exam  Vitals and nursing note reviewed. Constitutional:       Appearance: Normal appearance. She is normal weight. HENT:      Head: Normocephalic and atraumatic. Nose: Nose normal.      Mouth/Throat:      Mouth: Mucous membranes are moist.   Eyes:      Conjunctiva/sclera: Conjunctivae normal.   Cardiovascular:      Rate and Rhythm: Normal rate. Pulses: Normal pulses. Pulmonary:      Effort: Pulmonary effort is normal. No respiratory distress. Musculoskeletal:         General: No swelling or deformity. Normal range of motion. Skin:     General: Skin is warm and dry. Findings: No rash. Neurological:      General: No focal deficit present. Mental Status: She is alert and oriented to person, place, and time. Sensory: No sensory deficit. Motor: No weakness.    Psychiatric:         Mood and Affect: Mood normal.         Behavior: Behavior normal.       Lab and Diagnostic Study Results     Labs -     Recent Results (from the past 12 hour(s))   CBC WITH AUTOMATED DIFF    Collection Time: 08/07/22  9:20 AM   Result Value Ref Range    WBC 5.6 3.6 - 11.0 K/uL    RBC 4.10 3.80 - 5.20 M/uL    HGB 11.0 (L) 11.5 - 16.0 g/dL    HCT 33.6 (L) 35.0 - 47.0 %    MCV 82.0 80.0 - 99.0 FL    MCH 26.8 26.0 - 34.0 PG    MCHC 32.7 30.0 - 36.5 g/dL    RDW 15.3 (H) 11.5 - 14.5 %    PLATELET 331 885 - 275 K/uL    MPV 11.8 8.9 - 12.9 FL    NRBC 0.0 0.0  WBC    ABSOLUTE NRBC 0.00 0.00 - 0.01 K/uL    NEUTROPHILS 56 32 - 75 %    LYMPHOCYTES 35 12 - 49 % MONOCYTES 8 5 - 13 %    EOSINOPHILS 0 0 - 7 %    BASOPHILS 1 0 - 1 %    IMMATURE GRANULOCYTES 0 0 - 0.5 %    ABS. NEUTROPHILS 3.1 1.8 - 8.0 K/UL    ABS. LYMPHOCYTES 1.9 0.8 - 3.5 K/UL    ABS. MONOCYTES 0.4 0.0 - 1.0 K/UL    ABS. EOSINOPHILS 0.0 0.0 - 0.4 K/UL    ABS. BASOPHILS 0.1 0.0 - 0.1 K/UL    ABS. IMM. GRANS. 0.0 0.00 - 0.04 K/UL    DF AUTOMATED     METABOLIC PANEL, COMPREHENSIVE    Collection Time: 08/07/22  9:20 AM   Result Value Ref Range    Sodium 134 (L) 136 - 145 mmol/L    Potassium 4.6 3.5 - 5.1 mmol/L    Chloride 106 97 - 108 mmol/L    CO2 22 21 - 32 mmol/L    Anion gap 6 5 - 15 mmol/L    Glucose 83 65 - 100 mg/dL    BUN 5 (L) 6 - 20 mg/dL    Creatinine 0.49 (L) 0.55 - 1.02 mg/dL    BUN/Creatinine ratio 10 (L) 12 - 20      GFR est AA >60 >60 ml/min/1.73m2    GFR est non-AA >60 >60 ml/min/1.73m2    Calcium 9.1 8.5 - 10.1 mg/dL    Bilirubin, total 0.3 0.2 - 1.0 mg/dL    AST (SGOT) 26 15 - 37 U/L    ALT (SGPT) 19 12 - 78 U/L    Alk. phosphatase 54 45 - 117 U/L    Protein, total 6.8 6.4 - 8.2 g/dL    Albumin 3.2 (L) 3.5 - 5.0 g/dL    Globulin 3.6 2.0 - 4.0 g/dL    A-G Ratio 0.9 (L) 1.1 - 2.2         Radiologic Studies -   [unfilled]  CT Results  (Last 48 hours)      None          CXR Results  (Last 48 hours)      None            Medical Decision Making and ED Course   - I am the first and primary provider for this patient AND AM THE PRIMARY PROVIDER OF RECORD. - I reviewed the vital signs, available nursing notes, past medical history, past surgical history, family history and social history. - Initial assessment performed. The patients presenting problems have been discussed, and the staff are in agreement with the care plan formulated and outlined with them. I have encouraged them to ask questions as they arise throughout their visit. Vital Signs-Reviewed the patient's vital signs.     Patient Vitals for the past 12 hrs:   Temp Pulse Resp BP SpO2   08/07/22 0943 -- 70 21 115/82 100 % 22 0913 97.9 °F (36.6 °C) 79 18 126/84 99 %     Records Reviewed: Nursing Notes    ED Course:       ED Course as of 22 1047   Sun Aug 07, 2022   9811 51-year-old female presents for evaluation of 3 seizures at home which lasted for a total of 3 minutes all combined. Family gave sublingual Ativan with cessation of seizure. Here she is alert and oriented with a normal exam.  Patient states she is approximately 8 weeks pregnant. She reports that she is currently taking antibiotic for UTI. States that her last pregnancy was terminated early due to increased seizure activity. Her AEDs were recently uptitrated. She plans to have a  tomorrow at [facility] due to increased seizure frequency and undesired pregnancy. Here we will obtain labs including a CBC, CMP, lacosamide and Keppra level and UA. Will observe for any further seizure activity. Patient is not any seizures we will discharged home as she is having elective  tomorrow which will likely decrease her seizures as pregnancy seems to be the trigger.  [LW]   1000 EKG performed at 0916, read at 0918. Normal sinus rhythm with sinus arrhythmia. Rate of 71. Normal [place], normal QRS, normal QTC. Normal axis. [LW]   1045 Patient request discharge to home. He is alert and oriented and understands her diagnosis and likely cause of her seizures. She endorses compliance with her medications. She is going home with family. Discussed seizure precautions, return precautions. Discharged home. Encourage close follow-up with neurology, OB/GYN. [LW]      ED Course User Index  [LW] Nicky Romero MD           Disposition     Disposition: DC- Adult Discharges: All of the diagnostic tests were reviewed and questions answered. Diagnosis, care plan and treatment options were discussed. The patient understands the instructions and will follow up as directed. The patients results have been reviewed with them.   They have been counseled regarding their diagnosis. The patient verbally convey understanding and agreement of the signs, symptoms, diagnosis, treatment and prognosis and additionally agrees to follow up as recommended with their PCP in 24 - 48 hours. They also agree with the care-plan and convey that all of their questions have been answered. I have also put together some discharge instructions for them that include: 1) educational information regarding their diagnosis, 2) how to care for their diagnosis at home, as well a 3) list of reasons why they would want to return to the ED prior to their follow-up appointment, should their condition change. Discharged    Remove if not discharged  DISCHARGE PLAN:  1. Current Discharge Medication List        CONTINUE these medications which have NOT CHANGED    Details   albuterol (PROVENTIL HFA, VENTOLIN HFA, PROAIR HFA) 90 mcg/actuation inhaler Take 2 Puffs by inhalation every four (4) hours as needed for Wheezing, Shortness of Breath or Respiratory Distress. Qty: 1 Inhaler, Refills: 0      ARIPiprazole (ABILIFY) 5 mg tablet Take 1 Tablet by mouth daily. Indications: additional medications to treat depression  Qty: 30 Tablet, Refills: 0      escitalopram oxalate (LEXAPRO) 20 mg tablet Take 1 Tablet by mouth daily. Indications: major depressive disorder  Qty: 30 Tablet, Refills: 0      lacosamide (VIMPAT) 100 mg tab tablet Take 1 Tablet by mouth two (2) times a day. Max Daily Amount: 200 mg. Qty: 60 Tablet, Refills: 0    Associated Diagnoses: Seizure disorder (HCC)      levETIRAcetam 1,000 mg tablet Take 1 Tablet by mouth two (2) times a day. Indications: additional medication for tonic-clonic epilepsy  Qty: 60 Tablet, Refills: 0      traZODone (DESYREL) 50 mg tablet Take 1 Tablet by mouth nightly as needed for Sleep. Indications: insomnia associated with depression  Qty: 30 Tablet, Refills: 0      guaiFENesin ER (MUCINEX) 600 mg ER tablet Take 1 Tablet by mouth two (2) times a day.   Qty: 30 Tablet, Refills: 0      venlafaxine-SR (EFFEXOR-XR) 150 mg capsule venlafaxine  mg capsule,extended release 24 hr   TK 1 C PO QD      ondansetron (ZOFRAN ODT) 8 mg disintegrating tablet Take 1 Tab by mouth every eight (8) hours as needed for Nausea. Qty: 30 Tab, Refills: 2      omeprazole (PRILOSEC) 20 mg capsule omeprazole 20 mg capsule,delayed release      prenatal vit-calcium-iron-fa (PRENATAL PLUS, CALCIUM CARB,) 27 mg iron- 1 mg tab Take 1 Tab by mouth daily. Indications: PREGNANCY           2.   Follow-up Information       Follow up With Specialties Details Why Contact Info    [facility]    Keep your follow up appointment tomorrow with [facility]    Follow up with [facility] as soon as possible    [facility] EMERGENCY DEPT Emergency Medicine  As needed [address] [id number] [phone]          3. Return to ED if worse   4. Current Discharge Medication List          Diagnosis     Clinical Impression:   1. Seizure disorder [facility])        Attestations:    Ernie Jaramillo MD    Please note that this dictation was completed with [employer], the [employer] voice recognition software. Quite often unanticipated grammatical, syntax, homophones, and other interpretive errors are inadvertently transcribed by the computer software. Please disregard these errors. Please excuse any errors that have escaped final proofreading. Thank you.

## 2022-08-07 NOTE — ED TRIAGE NOTES
Seizure x 4 this morning, was given sublingual ativan by family. Each seizure was witnessed and approx 3 min in total per family    Recent med changes: keppra increase, vimpat increase, started 2 types of antibiotics on Tuesday.  Pt is 8 weeks pregnant, stated this happened the last time she was pregnant as well

## 2022-08-08 LAB — LEVETIRACETAM SERPL-MCNC: 33.3 UG/ML (ref 10–40)

## 2022-08-10 LAB — LACOSAMIDE SERPL-MCNC: 6.4 UG/ML (ref 5–10)

## 2022-09-26 ENCOUNTER — NURSE TRIAGE (OUTPATIENT)
Dept: OTHER | Facility: CLINIC | Age: 27
End: 2022-09-26

## 2022-09-26 NOTE — TELEPHONE ENCOUNTER
Received call from Eleanor at [facility] with Red Flag Complaint. Subjective: Caller states \"has a swollen lymph node on left under jaw\"     Current Symptoms: painful to swallow    Onset: 1 day ago; worsening    Associated Symptoms: NA    Pain Severity: 5/10; throbbing; intermittent  When swallowing    Temperature: denies fever     What has been tried: Ibuprofen    LMP: NA Pregnant: No    Recommended disposition: See in Office Today or Tomorrow    Care advice provided, patient verbalizes understanding; denies any other questions or concerns; instructed to call back for any new or worsening symptoms. Patient/Caller agrees with recommended disposition; writer provided warm transfer to [address] [place] [address] Ave  at [facility] for appointment scheduling    Attention Provider: Thank you for allowing me to participate in the care of your patient. The patient was connected to triage in response to information provided to the [facility]. Please do not respond through this encounter as the response is not directed to a shared pool.     Reason for Disposition   Very tender to the touch but no fever    Protocols used: Lymph Nodes - Swollen-ADULT-[place]

## 2022-11-11 ENCOUNTER — APPOINTMENT (OUTPATIENT)
Dept: CT IMAGING | Age: 27
DRG: 751 | End: 2022-11-11
Attending: STUDENT IN AN ORGANIZED HEALTH CARE EDUCATION/TRAINING PROGRAM
Payer: MEDICAID

## 2022-11-11 ENCOUNTER — HOSPITAL ENCOUNTER (INPATIENT)
Age: 27
LOS: 1 days | Discharge: ANOTHER HOSPITAL | DRG: 751 | End: 2022-11-13
Attending: STUDENT IN AN ORGANIZED HEALTH CARE EDUCATION/TRAINING PROGRAM | Admitting: PSYCHIATRY & NEUROLOGY
Payer: MEDICAID

## 2022-11-11 DIAGNOSIS — R56.9 SEIZURE (HCC): Primary | ICD-10-CM

## 2022-11-11 LAB
ALBUMIN SERPL-MCNC: 3.4 G/DL (ref 3.5–5)
ALBUMIN/GLOB SERPL: 0.9 {RATIO} (ref 1.1–2.2)
ALP SERPL-CCNC: 70 U/L (ref 45–117)
ALT SERPL-CCNC: 17 U/L (ref 12–78)
ANION GAP SERPL CALC-SCNC: 7 MMOL/L (ref 5–15)
APAP SERPL-MCNC: <10 UG/ML (ref 10–30)
AST SERPL W P-5'-P-CCNC: 14 U/L (ref 15–37)
BASOPHILS # BLD: 0.1 K/UL (ref 0–0.1)
BASOPHILS NFR BLD: 1 % (ref 0–1)
BILIRUB SERPL-MCNC: 0.1 MG/DL (ref 0.2–1)
BUN SERPL-MCNC: 10 MG/DL (ref 6–20)
BUN/CREAT SERPL: 14 (ref 12–20)
CA-I BLD-MCNC: 8.8 MG/DL (ref 8.5–10.1)
CHLORIDE SERPL-SCNC: 109 MMOL/L (ref 97–108)
CK SERPL-CCNC: 95 U/L (ref 26–192)
CO2 SERPL-SCNC: 24 MMOL/L (ref 21–32)
CREAT SERPL-MCNC: 0.71 MG/DL (ref 0.55–1.02)
DIFFERENTIAL METHOD BLD: ABNORMAL
EOSINOPHIL # BLD: 0.1 K/UL (ref 0–0.4)
EOSINOPHIL NFR BLD: 2 % (ref 0–7)
ERYTHROCYTE [DISTWIDTH] IN BLOOD BY AUTOMATED COUNT: 15.3 % (ref 11.5–14.5)
ETHANOL SERPL-MCNC: <10 MG/DL
FLUAV RNA SPEC QL NAA+PROBE: NOT DETECTED
FLUBV RNA SPEC QL NAA+PROBE: NOT DETECTED
GLOBULIN SER CALC-MCNC: 3.8 G/DL (ref 2–4)
GLUCOSE SERPL-MCNC: 91 MG/DL (ref 65–100)
HCT VFR BLD AUTO: 34.1 % (ref 35–47)
HGB BLD-MCNC: 10.9 G/DL (ref 11.5–16)
IMM GRANULOCYTES # BLD AUTO: 0 K/UL (ref 0–0.04)
IMM GRANULOCYTES NFR BLD AUTO: 0 % (ref 0–0.5)
LYMPHOCYTES # BLD: 1.7 K/UL (ref 0.8–3.5)
LYMPHOCYTES NFR BLD: 31 % (ref 12–49)
MAGNESIUM SERPL-MCNC: 2.1 MG/DL (ref 1.6–2.4)
MCH RBC QN AUTO: 26.4 PG (ref 26–34)
MCHC RBC AUTO-ENTMCNC: 32 G/DL (ref 30–36.5)
MCV RBC AUTO: 82.6 FL (ref 80–99)
MONOCYTES # BLD: 0.4 K/UL (ref 0–1)
MONOCYTES NFR BLD: 7 % (ref 5–13)
NEUTS SEG # BLD: 3.3 K/UL (ref 1.8–8)
NEUTS SEG NFR BLD: 59 % (ref 32–75)
NRBC # BLD: 0 K/UL (ref 0–0.01)
NRBC BLD-RTO: 0 PER 100 WBC
PLATELET # BLD AUTO: 330 K/UL (ref 150–400)
PMV BLD AUTO: 11.2 FL (ref 8.9–12.9)
POTASSIUM SERPL-SCNC: 4.2 MMOL/L (ref 3.5–5.1)
PROT SERPL-MCNC: 7.2 G/DL (ref 6.4–8.2)
RBC # BLD AUTO: 4.13 M/UL (ref 3.8–5.2)
SALICYLATES SERPL-MCNC: <1.7 MG/DL (ref 2.8–20)
SARS-COV-2, COV2: NOT DETECTED
SODIUM SERPL-SCNC: 140 MMOL/L (ref 136–145)
WBC # BLD AUTO: 5.6 K/UL (ref 3.6–11)

## 2022-11-11 PROCEDURE — 83735 ASSAY OF MAGNESIUM: CPT

## 2022-11-11 PROCEDURE — 99285 EMERGENCY DEPT VISIT HI MDM: CPT

## 2022-11-11 PROCEDURE — 70450 CT HEAD/BRAIN W/O DYE: CPT

## 2022-11-11 PROCEDURE — 74011250636 HC RX REV CODE- 250/636: Performed by: STUDENT IN AN ORGANIZED HEALTH CARE EDUCATION/TRAINING PROGRAM

## 2022-11-11 PROCEDURE — 93005 ELECTROCARDIOGRAM TRACING: CPT

## 2022-11-11 PROCEDURE — 82550 ASSAY OF CK (CPK): CPT

## 2022-11-11 PROCEDURE — 80179 DRUG ASSAY SALICYLATE: CPT

## 2022-11-11 PROCEDURE — 82077 ASSAY SPEC XCP UR&BREATH IA: CPT

## 2022-11-11 PROCEDURE — 36415 COLL VENOUS BLD VENIPUNCTURE: CPT

## 2022-11-11 PROCEDURE — 87636 SARSCOV2 & INF A&B AMP PRB: CPT

## 2022-11-11 PROCEDURE — 96374 THER/PROPH/DIAG INJ IV PUSH: CPT

## 2022-11-11 PROCEDURE — 80143 DRUG ASSAY ACETAMINOPHEN: CPT

## 2022-11-11 PROCEDURE — 85025 COMPLETE CBC W/AUTO DIFF WBC: CPT

## 2022-11-11 PROCEDURE — 80053 COMPREHEN METABOLIC PANEL: CPT

## 2022-11-11 RX ORDER — ONDANSETRON 4 MG/1
4 TABLET, ORALLY DISINTEGRATING ORAL
Status: COMPLETED | OUTPATIENT
Start: 2022-11-11 | End: 2022-11-11

## 2022-11-11 RX ORDER — ONDANSETRON 2 MG/ML
4 INJECTION INTRAMUSCULAR; INTRAVENOUS
Status: COMPLETED | OUTPATIENT
Start: 2022-11-11 | End: 2022-11-11

## 2022-11-11 RX ADMIN — ONDANSETRON 4 MG: 4 TABLET, ORALLY DISINTEGRATING ORAL at 20:25

## 2022-11-11 RX ADMIN — ONDANSETRON 4 MG: 2 INJECTION INTRAMUSCULAR; INTRAVENOUS at 19:11

## 2022-11-11 NOTE — ED NOTES
PT mother called and reported that she noted pills in vomit in PT's room. PT denies attempting to harm self.

## 2022-11-11 NOTE — ED TRIAGE NOTES
Per EMS, upon arrival pt seizing, unsure of how long seizure took place. Gave 5mg versed intranasally. Mother states pt had a mild seizure. Pt has hx of seizures, takes two medications for seizures daily, however still does have breakthrough seizures. Pt drowsy, but responsive at this time. [phone]: Charge Nurse to bedside, family member calledd, Lennox Canal, states that she found letters stating goodbye and an empty pill bottle of Vimpat. Pt placed on suicide precautions at this time with a one-to-one present with continuous line of sight of patient. monitor BP  cont meds

## 2022-11-11 NOTE — ED PROVIDER NOTES
[id number]  EMERGENCY DEPARTMENT ENCOUNTER NOTE    Date: 11/11/2022  Patient Name: Willie Craig    History of Presenting Illness     Chief Complaint   Patient presents with    Seizure     HPI: Willie Cousins, 32 y.o. female with a past medical history and outpatient medications as listed and reviewed below  presents for seizure. The patient has a history of seizures and has breakthrough seizures. She reports compliance with her medicines. She was noticed to have generalized tonic-clonic seizures by her mom that aborted afterwards after EMS gave her benzos. Her mother did find an note saying goodbye and an empty Vimpat container at the bedside and she is concerned that the patient had a suicide attempt. The patient denies any suicidal ideations or attempts however she reports a history of suicidal ideations. Patient denies any diplopia, dysarthria, facial droop, syncope, seizures, vertigo, or weakness or numbness in the upper or lower extremities.     Medical History   I reviewed the medical, surgical, family, and social history, as well as allergies:    PCP: Lynnette Tan MD    Past Medical History:  Past Medical History:   Diagnosis Date    Anemia 11/13/2019    Asthma     Epile[address] ([facility] [address]     Seizures ([facility] [address]     last seizure two weeks ago     Past Surgical History:  Past Surgical History:   Procedure Laterality Date    HX OTHER SURGICAL Left 2008    Left eye cyst removal     Current Outpatient Medications:  Current Outpatient Medications   Medication Instructions    albuterol (PROVENTIL HFA, VENTOLIN HFA, PROAIR HFA) 90 mcg/actuation inhaler 2 Puffs, Inhalation, EVERY 4 HOURS AS NEEDED    ARIPiprazole (ABILIFY) 5 mg, Oral, DAILY    escitalopram oxalate (LEXAPRO) 20 mg, Oral, DAILY    guaiFENesin ER (MUCINEX) 600 mg, Oral, 2 TIMES DAILY    lacosamide (VIMPAT) 100 mg, Oral, 2 TIMES DAILY    levETIRAcetam 1,000 mg, Oral, 2 TIMES DAILY    omeprazole (PRILOSEC) 20 mg capsule omeprazole 20 mg capsule,delayed release    ondansetron (ZOFRAN ODT) 8 mg, Oral, EVERY 8 HOURS AS NEEDED    prenatal vit-calcium-iron-fa (PRENATAL PLUS, CALCIUM CARB,) 27 mg iron- 1 mg tab 1 Tablet, DAILY    traZODone (DESYREL) 50 mg, Oral, BEDTIME PRN    venlafaxine-SR (EFFEXOR-XR) 150 mg capsule venlafaxine  mg capsule,extended release 24 hr   TK 1 C PO QD      Family History:  No family history on file. Social History:  Social History     Tobacco Use    Smoking status: Every Day     Packs/day: 5.00     Years: 2.00     Pack years: 10.00     Types: Cigarettes    Smokeless tobacco: Never   Substance Use Topics    Alcohol use: Yes    Drug use: No     Allergies: Allergies   Allergen Reactions    Citrus And Derivatives Hives    Lamictal [Lamotrigine] Nausea and Vomiting       Review of Systems     Review of Systems  Negative: Positives and pertinent negatives as per HPI. All other systems were reviewed and are negative. Physical Exam & Vital Signs   Vital Signs - I reviewed the patient's vital signs.     Patient Vitals for the past 12 hrs:   Temp Pulse Resp BP SpO2   11/11/22 2041 -- 70 13 -- 100 %   11/11/22 1810 -- 73 22 122/87 100 %   11/11/22 1740 -- 74 23 115/85 99 %   11/11/22 1710 -- 83 25 124/82 97 %   11/11/22 1655 -- 85 23 (!) 129/91 99 %   11/11/22 1630 -- -- -- 107/76 100 %   11/11/22 1625 97.9 °F (36.6 °C) 86 18 (!) 126/91 99 %     Physical Exam:    GENERAL: awake, alert, cooperative, not in distress  HEENT:  * Pupils equal, EOMI  * Head atraumatic  CV:  * audible heart sounds  * warm and perfused extremities bilaterally  PULMONARY: Good air movement, no wheezes, no crackles  ABDOMEN/: soft, no distension, no guarding, no abdominal tenderness  EXTREMITIES/BACK: warm and perfused, no tenderness, no edema  SKIN: no rashes or signs of trauma  NEURO:   * GCS = 15 (E=4, V=5, M=6)  * Awake, alert, oriented x 3, normal speech  * CNs II-XII intact  * Strength 5/5 in all extremities  * Sensory exam grossly normal  * No pronator drift or dysmetria  * NIHSS 0    Medical Decision Making     Patient is a 32 y.o. female presenting for seizure and concern for Vimpat OD. Vitals reveal no significant abnormalities and physical exam reveals no significant abnormalities. EKG showed no significant abnormalities. Based on the history, physical exam, risk factors, and vital signs, differential includes: Overdose, breakthrough seizure, seizure due to medication noncompliance. Poison contacted and recommended observation till 10 with basic labs. Patient is stable. We will consult psychiatry as well. .    See ED Course and Reassessment for evaluation and discussion. EMR Automatically Imported Results     Labs:  Recent Results (from the past 12 hour(s))   CBC WITH AUTOMATED DIFF    Collection Time: 11/11/22  5:06 PM   Result Value Ref Range    WBC 5.6 3.6 - 11.0 K/uL    RBC 4.13 3.80 - 5.20 M/uL    HGB 10.9 (L) 11.5 - 16.0 g/dL    HCT 34.1 (L) 35.0 - 47.0 %    MCV 82.6 80.0 - 99.0 FL    MCH 26.4 26.0 - 34.0 PG    MCHC 32.0 30.0 - 36.5 g/dL    RDW 15.3 (H) 11.5 - 14.5 %    PLATELET 442 140 - 861 K/uL    MPV 11.2 8.9 - 12.9 FL    NRBC 0.0 0.0  WBC    ABSOLUTE NRBC 0.00 0.00 - 0.01 K/uL    NEUTROPHILS 59 32 - 75 %    LYMPHOCYTES 31 12 - 49 %    MONOCYTES 7 5 - 13 %    EOSINOPHILS 2 0 - 7 %    BASOPHILS 1 0 - 1 %    IMMATURE GRANULOCYTES 0 0 - 0.5 %    ABS. NEUTROPHILS 3.3 1.8 - 8.0 K/UL    ABS. LYMPHOCYTES 1.7 0.8 - 3.5 K/UL    ABS. MONOCYTES 0.4 0.0 - 1.0 K/UL    ABS. EOSINOPHILS 0.1 0.0 - 0.4 K/UL    ABS. BASOPHILS 0.1 0.0 - 0.1 K/UL    ABS. IMM.  GRANS. 0.0 0.00 - 0.04 K/UL    DF AUTOMATED     METABOLIC PANEL, COMPREHENSIVE    Collection Time: 11/11/22  5:06 PM   Result Value Ref Range    Sodium 140 136 - 145 mmol/L    Potassium 4.2 3.5 - 5.1 mmol/L    Chloride 109 (H) 97 - 108 mmol/L    CO2 24 21 - 32 mmol/L    Anion gap 7 5 - 15 mmol/L    Glucose 91 65 - 100 mg/dL    BUN 10 6 - 20 mg/dL    Creatinine 0.71 0.55 - 1.02 mg/dL    BUN/Creatinine ratio 14 12 - 20      eGFR >60 >60 ml/min/1.73m2    Calcium 8.8 8.5 - 10.1 mg/dL    Bilirubin, total 0.1 (L) 0.2 - 1.0 mg/dL    AST (SGOT) 14 (L) 15 - 37 U/L    ALT (SGPT) 17 12 - 78 U/L    Alk. phosphatase 70 45 - 117 U/L    Protein, total 7.2 6.4 - 8.2 g/dL    Albumin 3.4 (L) 3.5 - 5.0 g/dL    Globulin 3.8 2.0 - 4.0 g/dL    A-G Ratio 0.9 (L) 1.1 - 2.2     ACETAMINOPHEN    Collection Time: 11/11/22  5:06 PM   Result Value Ref Range    Acetaminophen level <10 (L) 10 - 30 ug/mL   CK    Collection Time: 11/11/22  5:06 PM   Result Value Ref Range    CK 95 26 - 192 U/L   ETHYL ALCOHOL    Collection Time: 11/11/22  5:06 PM   Result Value Ref Range    ALCOHOL(ETHYL),SERUM <10 <10 mg/dL   MAGNESIUM    Collection Time: 11/11/22  5:06 PM   Result Value Ref Range    Magnesium 2.1 1.6 - 2.4 mg/dL   SALICYLATE    Collection Time: 11/11/22  5:06 PM   Result Value Ref Range    Salicylate level <7.1 (L) 2.8 - 20.0 mg/dL   COVID-19 WITH INFLUENZA A/B    Collection Time: 11/11/22  6:45 PM   Result Value Ref Range    SARS-CoV-2 by PCR Not Detected Not Detected      Influenza A by PCR Not Detected Not Detected      Influenza B by PCR Not Detected Not Detected       Radiologic Studies:  CT Results  (Last 48 hours)      None          CXR Results  (Last 48 hours)      None          Medications ordered:  Medications   ondansetron (ZOFRAN) injection 4 mg (4 mg IntraVENous Given 11/11/22 1911)   ondansetron (ZOFRAN ODT) tablet 4 mg (4 mg Oral Given 11/11/22 2025)       ED Course & Reassessment     ED Course:     ED Course as of 11/11/22 5388   Fri Nov 11, 2022   1737 CBC does not show any evidence of acute process. Leukocytosis not present to suggest infection. Hemoglobin not suggestive of acute anemia. [SS]   1823 No significant electrolyte derangements. Creatinine is not elevated more than baseline range making HERVE unlikely. No significant transaminitis noted.  Normal bilirubin. [SS]   1941 COVID-19 testing is negative. Influenza swab negative.   [SS]   2022 Acetaminophen level not suggestive of acute overdose. CPK not suggestive of significant rhabdomyolysis. Magnesium within normal limits. Salicylate level not suggestive of acute overdose. ETOH level not elevated. [SS]      ED Course User Index  [SS] Ellsi Nj MD       Reassessment:    Pending med clearance at 10 then psych admission. 10:56 PM  Was called in because patient had some imbalance while trying to walk. Repeat exam shows some imbalance when standing up howrver full repeat neuro exam including dysmetria and dysdiadokinesia testing was normal. Will do CTH. 10:57 PM Neuro Exam:  NEURO:   * GCS = 15 (E=4, V=5, M=6)  * Awake, alert, oriented x 3, normal speech  * CNs II-XII intact  * Strength 5/5 in all extremities  * Sensory exam grossly normal  * No pronator drift or dysmetria  * NIHSS 0  * Appears to have imbalance when stands up however has no issues during the process of standing up from bed. Final Disposition     Sign-out: PATIENT SIGNED OUT - FINAL DISPOSITION PENDING    Patient was signed out to incoming provider at 10:56 PM pending CTH, psychiatric assessment, and physical exam for ambulation. Final disposition pending completion of workup including vital signs and reassessment. Procedures, Critical Care, & Clinical Tools   Performed by: Colin Eng MD  Procedures     EKG interpretation (Preliminary):  Rhythm: normal sinus rhythm; and regular . Rate (approx.): 85. Axis: normal;  MI interval: normal;  QRS interval: normal ;  ST/T wave: normal;  Other findings: normal.        CRITICAL CARE DOCUMENTATION  NOT MET: Critical care billing criteria and/or time were NOT met. Diagnosis     Clinical Impression:   1.  Seizure [facility])        Attestations:  Colin Eng MD    Documentation Comments   - I am the first and primary provider for this patient and am the primary provider of record. - Initial assessment performed. The patients presenting problems have been discussed, and the staff are in agreement with the care plan formulated and outlined with them. I have encouraged them to ask questions as they arise throughout their visit. - Available medical records, nursing notes, old EKGs, and EMS run sheets (if patient was EMS transported) were reviewed    Please note that this dictation was completed with [employer], the computer voice recognition software. Quite often unanticipated grammatical, syntax, homophones, and other interpretive errors are inadvertently transcribed by the computer software. Please disregard these errors. Please excuse any errors that have escaped final proofreading.

## 2022-11-11 NOTE — ED NOTES
Poison control called [place] is contact. Reported that PT may have IN elongation, seizures, nausea and vomitting. Plan should be obs for 6 hours from arrival with symptomatic supportive care. CBC CMP, tylenol and aspirin levels.

## 2022-11-12 PROBLEM — R45.89 SUICIDAL BEHAVIOR: Status: ACTIVE | Noted: 2022-11-12

## 2022-11-12 LAB
AMPHET UR QL SCN: NEGATIVE
APPEARANCE UR: CLEAR
ATRIAL RATE: 85 BPM
BACTERIA URNS QL MICRO: NEGATIVE /HPF
BARBITURATES UR QL SCN: NEGATIVE
BENZODIAZ UR QL: POSITIVE
BILIRUB UR QL: NEGATIVE
CALCULATED P AXIS, ECG09: 25 DEGREES
CALCULATED R AXIS, ECG10: 29 DEGREES
CALCULATED T AXIS, ECG11: 10 DEGREES
CANNABINOIDS UR QL SCN: POSITIVE
COCAINE UR QL SCN: NEGATIVE
COLOR UR: ABNORMAL
DIAGNOSIS, 93000: NORMAL
DRUG SCRN COMMENT,DRGCM: ABNORMAL
GLUCOSE UR STRIP.AUTO-MCNC: NEGATIVE MG/DL
HCG UR QL: NEGATIVE
HGB UR QL STRIP: ABNORMAL
KETONES UR QL STRIP.AUTO: NEGATIVE MG/DL
LEUKOCYTE ESTERASE UR QL STRIP.AUTO: NEGATIVE
METHADONE UR QL: NEGATIVE
MUCOUS THREADS URNS QL MICRO: ABNORMAL /LPF
NITRITE UR QL STRIP.AUTO: NEGATIVE
OPIATES UR QL: NEGATIVE
P-R INTERVAL, ECG05: 166 MS
PCP UR QL: NEGATIVE
PH UR STRIP: 6 [PH] (ref 5–8)
PROT UR STRIP-MCNC: 30 MG/DL
Q-T INTERVAL, ECG07: 352 MS
QRS DURATION, ECG06: 84 MS
QTC CALCULATION (BEZET), ECG08: 418 MS
RBC #/AREA URNS HPF: ABNORMAL /HPF (ref 0–5)
SP GR UR REFRACTOMETRY: 1.03 (ref 1–1.03)
UA: UC IF INDICATED,UAUC: ABNORMAL
UROBILINOGEN UR QL STRIP.AUTO: 0.1 EU/DL (ref 0.1–1)
VENTRICULAR RATE, ECG03: 85 BPM
WBC URNS QL MICRO: ABNORMAL /HPF (ref 0–4)

## 2022-11-12 PROCEDURE — 74011250637 HC RX REV CODE- 250/637: Performed by: PSYCHIATRY & NEUROLOGY

## 2022-11-12 PROCEDURE — 65220000003 HC RM SEMIPRIVATE PSYCH

## 2022-11-12 PROCEDURE — 80307 DRUG TEST PRSMV CHEM ANLYZR: CPT

## 2022-11-12 PROCEDURE — 81001 URINALYSIS AUTO W/SCOPE: CPT

## 2022-11-12 PROCEDURE — 81025 URINE PREGNANCY TEST: CPT

## 2022-11-12 RX ORDER — MIRTAZAPINE 15 MG/1
15 TABLET, ORALLY DISINTEGRATING ORAL
Status: DISCONTINUED | OUTPATIENT
Start: 2022-11-12 | End: 2022-11-13 | Stop reason: HOSPADM

## 2022-11-12 RX ORDER — ESCITALOPRAM OXALATE 10 MG/1
20 TABLET ORAL DAILY
Status: DISCONTINUED | OUTPATIENT
Start: 2022-11-13 | End: 2022-11-13 | Stop reason: HOSPADM

## 2022-11-12 RX ORDER — DIPHENHYDRAMINE HYDROCHLORIDE 50 MG/ML
50 INJECTION, SOLUTION INTRAMUSCULAR; INTRAVENOUS
Status: DISCONTINUED | OUTPATIENT
Start: 2022-11-12 | End: 2022-11-13 | Stop reason: HOSPADM

## 2022-11-12 RX ORDER — TRAZODONE HYDROCHLORIDE 50 MG/1
50 TABLET ORAL
Status: DISCONTINUED | OUTPATIENT
Start: 2022-11-12 | End: 2022-11-13 | Stop reason: HOSPADM

## 2022-11-12 RX ORDER — LACOSAMIDE 200 MG/1
200 TABLET ORAL 2 TIMES DAILY
COMMUNITY

## 2022-11-12 RX ORDER — LACOSAMIDE 100 MG/1
200 TABLET ORAL 2 TIMES DAILY
Status: DISCONTINUED | OUTPATIENT
Start: 2022-11-12 | End: 2022-11-13 | Stop reason: HOSPADM

## 2022-11-12 RX ORDER — ARIPIPRAZOLE 5 MG/1
5 TABLET ORAL DAILY
Status: DISCONTINUED | OUTPATIENT
Start: 2022-11-13 | End: 2022-11-13 | Stop reason: HOSPADM

## 2022-11-12 RX ORDER — HYDROXYZINE 50 MG/1
50 TABLET, FILM COATED ORAL
Status: DISCONTINUED | OUTPATIENT
Start: 2022-11-12 | End: 2022-11-13 | Stop reason: HOSPADM

## 2022-11-12 RX ORDER — ADHESIVE BANDAGE
30 BANDAGE TOPICAL DAILY PRN
Status: DISCONTINUED | OUTPATIENT
Start: 2022-11-12 | End: 2022-11-13 | Stop reason: HOSPADM

## 2022-11-12 RX ORDER — IBUPROFEN 200 MG
1 TABLET ORAL DAILY
Status: DISCONTINUED | OUTPATIENT
Start: 2022-11-13 | End: 2022-11-13 | Stop reason: HOSPADM

## 2022-11-12 RX ORDER — MIRTAZAPINE 15 MG/1
15 TABLET, FILM COATED ORAL
COMMUNITY

## 2022-11-12 RX ORDER — ACETAMINOPHEN 325 MG/1
650 TABLET ORAL
Status: DISCONTINUED | OUTPATIENT
Start: 2022-11-12 | End: 2022-11-13 | Stop reason: HOSPADM

## 2022-11-12 RX ADMIN — MIRTAZAPINE 15 MG: 15 TABLET, ORALLY DISINTEGRATING ORAL at 22:14

## 2022-11-12 RX ADMIN — BRIVARACETAM 100 MG: 100 TABLET, FILM COATED ORAL at 22:14

## 2022-11-12 RX ADMIN — LACOSAMIDE 200 MG: 100 TABLET, FILM COATED ORAL at 22:14

## 2022-11-12 NOTE — ED NOTES
Patient resting in room, lying on stretcher with eyes closed appears to be sleeping, easily awakens to voice. Respirations even and unlabored, NAD. Patient encouraged to provide urine specimen, reports unable to so at this time. Denies having any further needs at this time.

## 2022-11-12 NOTE — BH NOTES
Ramonita Terry a 32year old  [place] female, voluntary, admitted under the professional care of Dr. Jonatan Andrade with a diagnosis of Major depression with suicidal ideations. Patient with medical history of Asthma and Seizures. Patient came from home to ED with seizure activity, an empty vimpat bottle found that was filled on 11/08/22, Good bye note found by mother, pills found in patient vomitus by mother in patients room. Patient tearful as she stated \"I can't believe I did this stupid stuff. \"  \"I believe I did this on impulse because I was upset. \"  \"My money didn't come in yet. \"  \"My car is in the process of being impounded. \"  She explained that her bills are piling up. \"I'm just very overwhelmed. 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 Patient has 2 children 6and 10years old. Patient denied SI, HI, AH, and VH. She rated depression at a 10 and anxiety at an 8 on a scale of 0-10. Patient placed on close observation, Q 15 minute checks.

## 2022-11-12 NOTE — BSMART NOTE
Comprehensive Assessment Form Part 1      Section I - Disposition    Axis I - Major  Depression    Axis II - None   Axis III -   Past Medical History:   Diagnosis Date    Anemia 11/13/2019    Asthma     Epilepsy ([facility] [address]     Seizures ([facility] [address]     last seizure two weeks ago            The Medical Doctor to Psychiatrist conference was not completed. The Medical Doctor is in agreement with Psychiatrist disposition because of (reason) pt meets criteria for admission   The plan is voluntary admission upon medical clearance   The on-call Psychiatrist consulted was Dr. Wanda Colón . The admitting Psychiatrist will be Dr. Wanda Colón . The admitting Diagnosis is Major Depression   The Payor source is [place]       Section II - Integrated Summary  Summary:  Pt was brought into the ER via EMS due to having an act seizure. Pt has a history of seizure  disorder currently managed by medications. Pt's mother contacted the ER after she found an empty pill bottle for pt's Vimpat and good-bye letters by her bedside. Upon interviewing the pt she denied that she tried to overdose. Pt reported that she had taken her normal dosage of her mediations. Pt reported that the letters are mother found in a notebook were from a year ago from her [place] [address] attempt last year 5/21. Pt reported that emotionally she was feeling ok. Pt was still dazed from her seizure. Pt did acknowledge that she suffers from depression but stated a few times that she was not trying to harm herself tonight. Pt spoke about her children and her job with no indication that she was struggling. Overall emotionally she stated she felt \" ok\". Pt denied current SI. After talking with pt's mother Kimberly Duff. She reported that the pt's Vimpat was just refilled on 11/7/22 and the entire bubble pack was empty.  She also reported that the house was quarantining  in their rooms because the flu was going around in the house so she thinks the pt wasn't expecting her to come into her room this afternoon. Pt's mother also reported that the pt has been in contact with her younger sister who is away at school and last week she called their mother say that the pt wasn't in a good place but no details were given. When mom went to get on the pt she was on the back porch in tears. Pt told the mom that she feels that she is not where she needs to be in life and that she was feeling down about her recent seizures. Per mother the seizures has been pretty consistent over the past 4 months. Pt has been to epilepsy monitoring at least 2 twice in the past 4 months. After obtaining this new information Nurse Guillermo Garcia spoke with the pt and she agreed to a voluntary admission. Pt is currently practicing as an LPN. The patienthas demonstrated mental capacity to provide informed consent. The information is given by the patient, her mother and previous documentation   The Chief Complaint is attempted SI attempt   The Precipitant Factors are non identified   Previous Hospitalizations: 5/21 [facility] and [facility] as an adolescent   The patient has not previously been in restraints. Current Psychiatrist and/or  is [facility] Joanna Bazan.    Lethality Assessment:    The potential for suicide noted by the following: previous history of attempts which occured on 5/21  in the form(s) of overdose and as an adolescent drank detergent . The potential for homicide is not noted. The patient has not been a perpetrator of sexual or physical abuse. There are not pending charges. The patient is felt to be at risk for self harm or harm to others. The attending nurse was advised to remove potentially harmful or dangerous items from the patient's room . Section III - Psychosocial  The patient's overall mood and attitude is withdrawn . Feelings of helplessness and hopelessness are not observed. Generalized anxiety is not observed. Panic is not observed. Phobias are not observed.   Obsessive compulsive tendencies are not observed. Section IV - Mental Status Exam  The patient's appearance shows no evidence of impairment. The patient's behavior shows poor eye contact. The patient is oriented to time, place, person and situation. The patient's speech is soft. The patient's mood is withdrawn. The range of affect is flat. The patient's thought content demonstrates no evidence of impairment. The thought process shows no evidence of impairment. The patient's perception shows no evidence of impairment. The patient's memory shows no evidence of impairment. The patient's appetite shows no evidence of impairment. The patient's sleep shows no evidence of impairment. The patient shows little insight. The patient's judgement shows no evidence of impairment. Section V - Substance Abuse  The patient is not using substances. The patient is using tobacco vape pen  orally for 5-10 years with last use on unknown . The patient has experienced the following withdrawal symptoms: N/A. Section VI - Living Arrangements  The patient is single. The patient lives with a parent. The patient has 2 children ages 6 & 10. The pt's 6yr old lives with his father and she sees him ever other weekend. The patient does plan to return home upon discharge. The patient does not have legal issues pending. The patient's source of income comes from employment. Restorationist and cultural practices have been noted and include: not noted     The patient's greatest support comes from mother  and this person will be involved with the treatment. The patient has not been in an event described as horrible or outside the realm of ordinary life experience either currently or in the past.  The patient has not been a victim of sexual/physical abuse.     Section VII - Other Areas of Clinical Concern  The highest grade achieved is some college  with the overall quality of school experience being described as ok   The patient is currently employed and speaks [place] as a primary language. The patient has no communication impairments affecting communication. The patient's preference for learning can be described as: can read and write adequately.   The patient's hearing is normal.  The patient's vision is normal.      Xavier Hong LCSW

## 2022-11-12 NOTE — ED NOTES
Patient provided with snacks and drink. Resting in room, lying on stretcher. Respirations even and unlabored, NAD. Patient care attendant remains at bedside for safety.

## 2022-11-12 NOTE — ED NOTES
Received care of patient, currently sleeping at this time, constant observer at bedside. Patient in green gown, currently waiting on  urine to be admitted. Patient unable to use restroom at this time, voluntary.

## 2022-11-12 NOTE — BSMART NOTE
BSMART UPDATE/REASSESSMENT    Pt was seen by this writer in ED room 21. She was laying in the ED bed and resting but was able to be aroused easily. She sat up to speak with this writer. Pt was alert and oriented x4. She reported some recent depression to include crying spells and sleep disturbances but denied SI/HI/AVH. She reported that she had a seizure and was sent to the hospital for that but upon her mother cleaning up the vomit in her room, she came across some concerning things. Pt was adamant about the empty pill bottles being old and the suicide notes being from 2 years ago. Pt gave permission for this writer to call her mother. Pt's mother reported that the bottle had the date of November 7th on it. Nurse called and confirmed with pharmacy that the pills were filled on November 8th. Pt's mother also reported that there for 4 notes written and that they were all to different people. She indicated that they were found in the notebook that she carries daily to work. Mother also reported that she found pills in her vomit which was concerning. Pt reported that she does not want to stay but stated that if it is recommended, she wants to stay voluntary and does not want to be committed. At this time, IP treatment is being recommended. Based on Pt's history of attempted suicides, history of overdosing and increased depression, pt meets criteria for IP hospitalization for stabilization.

## 2022-11-12 NOTE — GROUP NOTE
IP  GROUP DOCUMENTATION INDIVIDUAL                                                                          Group Therapy Note    Date: 11/12/2022    Group Start Time: 2340  Group End Time: 1400  Group Topic: Recreational/Music Therapy    [facility] 2 BH NON ACUTE    Sophie Starks    IP [address] GROUP DOCUMENTATION GROUP    Group Therapy Note    Attendees: 9/13       Attendance: Attended    Patient's Goal:  STG: Attends activities and groups      Interventions/techniques: Art integration and Supported    Follows Directions: Followed directions    Interactions: Interacted appropriately    Mental Status: Calm    Behavior/appearance: Attentive    Goals Achieved: Able to engage in interactions and Able to listen to others      Additional Notes: Attended group and selected songs to listen to with peers. Receptive to intervention and responded to prompts in group.      Alysha Bess, CTRS

## 2022-11-12 NOTE — ED NOTES
TRANSFER - OUT REPORT:    Verbal report given to  rosey on [facility]  being transferred to [facility] for routine progression of care       Report consisted of patients Situation, Background, Assessment and   Recommendations(SBAR). Information from the following report(s) SBAR was reviewed with the receiving nurse. Lines:   Peripheral IV 11/11/22 Posterior;Right Hand (Active)        Opportunity for questions and clarification was provided.       Patient transported with:   [employer]

## 2022-11-12 NOTE — ED NOTES
Patient is staying voluntary. Pt is currently on female cycle. Pt has underwear and pants on. Constant Observer Yes - Name: Radha   Constant Observer Oriented YES   High risk patients are in line of sight at all times Yes   Excess equipment/medical supplies not necessary for the care of the patient removed Yes   All sharp or dangerous objects are removed from room: including but not limited to belts, pens & pencils, needles, medications, cosmetics, lighters, matches, nail files, watches, necklaces, glass objects, razors, razor blades, knives, aerosol sprays, drawstring pants, shoes, cords (telephone, call bells, etc.) cleaning wipes or other cleaning items, aluminum cans, not permanently attached wall décor Yes   Telephone/cell phone removed as well as TV remote (batteries can be swallowed) Yes   Patient belongings removed and labeled at nurses station Yes   Excess linen is removed from room Yes   All plastic bags are removed from the room and replaced with paper trash bags Yes   Patient is in paper scrubs or appropriate gown and using hospital socks with rubber soles No   No metal, hard eating utensils or hard plates are on meal tray Yes   Remove all cleaning agents used by [employer] Yes   If Crucifix is hanging on a nail, remove Crucifix as well as the nail Yes       *If any question above is answered \"No,\" documentation is required.

## 2022-11-13 ENCOUNTER — APPOINTMENT (OUTPATIENT)
Dept: CT IMAGING | Age: 27
DRG: 751 | End: 2022-11-13
Attending: STUDENT IN AN ORGANIZED HEALTH CARE EDUCATION/TRAINING PROGRAM
Payer: MEDICAID

## 2022-11-13 ENCOUNTER — HOSPITAL ENCOUNTER (INPATIENT)
Age: 27
LOS: 1 days | Discharge: HOME | DRG: 751 | End: 2022-11-14
Attending: STUDENT IN AN ORGANIZED HEALTH CARE EDUCATION/TRAINING PROGRAM | Admitting: PSYCHIATRY & NEUROLOGY
Payer: MEDICAID

## 2022-11-13 VITALS
HEIGHT: 64 IN | TEMPERATURE: 98.5 F | BODY MASS INDEX: 35.27 KG/M2 | RESPIRATION RATE: 16 BRPM | WEIGHT: 206.6 LBS | SYSTOLIC BLOOD PRESSURE: 117 MMHG | DIASTOLIC BLOOD PRESSURE: 80 MMHG | HEART RATE: 69 BPM | OXYGEN SATURATION: 100 %

## 2022-11-13 DIAGNOSIS — S09.90XA MINOR HEAD INJURY, INITIAL ENCOUNTER: ICD-10-CM

## 2022-11-13 DIAGNOSIS — R55 SYNCOPE AND COLLAPSE: Primary | ICD-10-CM

## 2022-11-13 PROBLEM — F32.A DEPRESSION WITH SUICIDAL IDEATION: Status: ACTIVE | Noted: 2022-11-13

## 2022-11-13 PROBLEM — R45.851 DEPRESSION WITH SUICIDAL IDEATION: Status: ACTIVE | Noted: 2022-11-13

## 2022-11-13 LAB
GLUCOSE BLD STRIP.AUTO-MCNC: 98 MG/DL (ref 65–100)
PERFORMED BY, TECHID: NORMAL

## 2022-11-13 PROCEDURE — 74011250637 HC RX REV CODE- 250/637: Performed by: STUDENT IN AN ORGANIZED HEALTH CARE EDUCATION/TRAINING PROGRAM

## 2022-11-13 PROCEDURE — 70486 CT MAXILLOFACIAL W/O DYE: CPT

## 2022-11-13 PROCEDURE — 72125 CT NECK SPINE W/O DYE: CPT

## 2022-11-13 PROCEDURE — 70450 CT HEAD/BRAIN W/O DYE: CPT

## 2022-11-13 PROCEDURE — 74011250637 HC RX REV CODE- 250/637: Performed by: PSYCHIATRY & NEUROLOGY

## 2022-11-13 PROCEDURE — 65220000003 HC RM SEMIPRIVATE PSYCH

## 2022-11-13 PROCEDURE — 74011000250 HC RX REV CODE- 250: Performed by: STUDENT IN AN ORGANIZED HEALTH CARE EDUCATION/TRAINING PROGRAM

## 2022-11-13 PROCEDURE — 82962 GLUCOSE BLOOD TEST: CPT

## 2022-11-13 RX ORDER — LACOSAMIDE 100 MG/1
200 TABLET ORAL 2 TIMES DAILY
Status: DISCONTINUED | OUTPATIENT
Start: 2022-11-13 | End: 2022-11-14 | Stop reason: HOSPADM

## 2022-11-13 RX ORDER — ESCITALOPRAM OXALATE 10 MG/1
20 TABLET ORAL DAILY
Status: DISCONTINUED | OUTPATIENT
Start: 2022-11-14 | End: 2022-11-14 | Stop reason: HOSPADM

## 2022-11-13 RX ORDER — ACETAMINOPHEN 325 MG/1
650 TABLET ORAL
Status: DISCONTINUED | OUTPATIENT
Start: 2022-11-13 | End: 2022-11-14 | Stop reason: HOSPADM

## 2022-11-13 RX ORDER — MIRTAZAPINE 15 MG/1
15 TABLET, FILM COATED ORAL
Status: DISCONTINUED | OUTPATIENT
Start: 2022-11-13 | End: 2022-11-14 | Stop reason: HOSPADM

## 2022-11-13 RX ORDER — LIDOCAINE 4 G/100G
1 PATCH TOPICAL EVERY 24 HOURS
Status: DISCONTINUED | OUTPATIENT
Start: 2022-11-13 | End: 2022-11-14 | Stop reason: HOSPADM

## 2022-11-13 RX ORDER — ACETAMINOPHEN 325 MG/1
975 TABLET ORAL
Status: COMPLETED | OUTPATIENT
Start: 2022-11-13 | End: 2022-11-13

## 2022-11-13 RX ORDER — IBUPROFEN 200 MG
1 TABLET ORAL DAILY
Status: DISCONTINUED | OUTPATIENT
Start: 2022-11-14 | End: 2022-11-14 | Stop reason: HOSPADM

## 2022-11-13 RX ORDER — ARIPIPRAZOLE 5 MG/1
5 TABLET ORAL DAILY
Status: DISCONTINUED | OUTPATIENT
Start: 2022-11-14 | End: 2022-11-14 | Stop reason: HOSPADM

## 2022-11-13 RX ORDER — DIPHENHYDRAMINE HYDROCHLORIDE 50 MG/ML
50 INJECTION, SOLUTION INTRAMUSCULAR; INTRAVENOUS
Status: DISCONTINUED | OUTPATIENT
Start: 2022-11-13 | End: 2022-11-14 | Stop reason: HOSPADM

## 2022-11-13 RX ORDER — TRAZODONE HYDROCHLORIDE 50 MG/1
50 TABLET ORAL
Status: DISCONTINUED | OUTPATIENT
Start: 2022-11-13 | End: 2022-11-14 | Stop reason: HOSPADM

## 2022-11-13 RX ORDER — ADHESIVE BANDAGE
30 BANDAGE TOPICAL DAILY PRN
Status: DISCONTINUED | OUTPATIENT
Start: 2022-11-13 | End: 2022-11-14 | Stop reason: HOSPADM

## 2022-11-13 RX ORDER — HYDROXYZINE 50 MG/1
50 TABLET, FILM COATED ORAL
Status: DISCONTINUED | OUTPATIENT
Start: 2022-11-13 | End: 2022-11-14 | Stop reason: HOSPADM

## 2022-11-13 RX ADMIN — LACOSAMIDE 200 MG: 100 TABLET, FILM COATED ORAL at 08:54

## 2022-11-13 RX ADMIN — ARIPIPRAZOLE 5 MG: 5 TABLET ORAL at 08:54

## 2022-11-13 RX ADMIN — ACETAMINOPHEN 975 MG: 325 TABLET ORAL at 18:07

## 2022-11-13 RX ADMIN — ESCITALOPRAM OXALATE 20 MG: 10 TABLET ORAL at 08:54

## 2022-11-13 RX ADMIN — BRIVARACETAM 100 MG: 100 TABLET, FILM COATED ORAL at 08:54

## 2022-11-13 NOTE — PROGRESS NOTES
Problem: Depressed Mood (Adult/Pediatric)  Goal: *STG: Remains safe in hospital  Outcome: Progressing Towards Goal  Goal: *STG: Complies with medication therapy  Outcome: Progressing Towards Goal     Problem: Suicide  Goal: *STG: Remains safe in hospital  Outcome: Progressing Towards Goal  Goal: *STG/LTG: Complies with medication therapy  Outcome: Progressing Towards Goal     Problem: Falls - Risk of  Goal: *Absence of Falls  Description: Document Erwin Fall Risk and appropriate interventions in the flowsheet.   Outcome: Progressing Towards Goal  Note: Fall Risk Interventions:

## 2022-11-13 NOTE — GROUP NOTE
IP  GROUP DOCUMENTATION INDIVIDUAL                                                                          Group Therapy Note    Date: 11/13/2022    Group Start Time: 6475  Group End Time: 1400  Group Topic: Recreational/Music Therapy    SRM 2  NON ACUTE    Sophie Starks    IP [facility] GROUP DOCUMENTATION GROUP    Group Therapy Note    Attendees: 8/13     Facilitated structured group to introduce healthy leisure task skill as positive way to cope and manage stress.      Attendance: Did not attend    Additional Notes:  Did not attend group despite encouragement    Alysha Bess, CTRS

## 2022-11-13 NOTE — GROUP NOTE
IP  GROUP DOCUMENTATION INDIVIDUAL                                                                          Group Therapy Note    Date: 11/13/2022    Group Start Time: 0930  Group End Time: 8717  Group Topic: Education Group - Inpatient    SRM 2 BH NON ACUTE    Estefany Sutton    IP [facility] GROUP DOCUMENTATION GROUP    Group Therapy Note    Attendees: 8/13    Facilitated group discussion on Common Reactions to Trauma and to identify Stress Risk Factors       Attendance: Attended    Patient's Goal:  STG: Attends activities and groups      Interventions/techniques: Informed, Provide feedback, and Supported    Follows Directions: Followed directions    Interactions: Interacted appropriately    Mental Status: Calm    Behavior/appearance: Attentive and Cooperative    Goals Achieved: Able to engage in interactions and Able to listen to others      Additional Notes: Attended group and actively engaged in group discussion. Was receptive to intervention and participated in discussion with cues and encouragement. PT was able to assess personal risk factors and identify needs for change in managing stressors. Verbalized need to set better boundaries with the need to please people especially her parents.      Jourdan Pain[place], CTRS

## 2022-11-13 NOTE — BH NOTES
PSYCHOSOCIAL ASSESSMENT  :Patient identifying info:   Pablito Acosta is a 32 y.o., female admitted 11/11/2022  4:17 PM     Presenting problem and precipitating factors: The patient reported that she has had a lot of things going on that have caused a lot of stress including work and taking care of her kids. Which then prompted her to attempt suicide by overdosing on pills. After doing so she realized what she had done and tried to make herself throw up. From there her mom called 911 and she was brought to the ED. Mental status assessment: The patient denied SI/HI/AVH at this time and was oriented x4, she described her mood as \"tired\" and presented a sad mood with a tearful affect. She expressed regret for what she did and had good insight into her mental health. Saying that she needs to work on her coping skills and have a therapist to speak with. Strengths: good at her job, book smart, and a fast learner       Collateral information: [facility] (mom) [phone]       Current psychiatric /substance abuse providers and contact info:   Dr. Thomas Esparza with [facility]     Previous psychiatric/substance abuse providers and response to treatment: This is the third hospitalization and was last at [facility] 2 years ago     Family history of mental illness or substance abuse:   Mom: Bipolar  Dad: Alcoholism  Grandmother: Anxiety  Sister: Depression       Substance abuse history:  Patient denied   Social History     Tobacco Use    Smoking status: Every Day     Packs/day: 5.00     Years: 2.00     Pack years: 10.00     Types: Cigarettes    Smokeless tobacco: Never   Substance Use Topics    Alcohol use: Yes       History of biomedical complications associated with substance abuse: This is the second OD attempt       Patient's current acceptance of treatment or motivation for change:  \"Need someone to talk to\"       Family constellation: The patient has a sister and lives with her mom       Is significant other involved?    The patient reports she has been talking with someone for 5 months but they're not official. And has 2 kids, Patience Parish (6) and Milton (6)     Describe support system: \"usually call my grandparents\"       Describe living arrangements and home environment: The patient lives with her mom, sister, step father, and daughter Mela Zambrano issues: Epilepsy- Last seizure was on 2022  Hospital Problems  Date Reviewed: 2015            Codes Class Noted POA    Suicidal behavior ICD-10-CM: R45.89  ICD-9-CM: V62.89  2022 Unknown        Major depression ICD-10-CM: F32.9  ICD-9-CM: 296.20  2021 Unknown           Trauma history: Verbal abuse       Legal issues: Patient denied       History of [place] service:  Patient denied      Financial status: Connect RN      Church/cultural factors:  Patient denied      Education/work history: Diploma       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

## 2022-11-13 NOTE — ED TRIAGE NOTES
Pt was in [address] [place] [address], had an unwitnessed seizure, [facility] in room, nurse said she saw seizure like activity, rapid response was called,  sent down to ED to be checked out, pt has a history of seizures, states she didn't get much sleep last night, but has been taking her seizure meds. Pt complains of chin, neck, and lower back pain.      Pt moves all extremities follows commands and is A&OX4 on arrival to ED

## 2022-11-13 NOTE — BH NOTES
PSA PART II ADDITIONAL INFORMATION        Access To Fire Arms: No    Substance Use: NO    Last Use:     Type of Substance: no substance use    Frequency of Use:     Request to See : NO    If yes, notified :     Release of Information Signed: YES    Release of Information Signed For:  Trey [place] (mom) [phone]     GUARDIAN/POA: NO    The patient signed and reviewed treatment plan

## 2022-11-13 NOTE — CONSULTS
Consult    Subjective:     Patient is a 32y.o. year old female history of asthma and seizures came to emergency room because of depression and overdose of Vimpat, took 4 pills and then throw up admitted to psychiatric floor for further evaluation treatment she denies any chest pain shortness with nausea vomiting diarrhea constipation    Past Medical History:   Diagnosis Date    Anemia 11/13/2019    Asthma     Epilepsy ([address]     Seizures ([address]     last seizure two weeks ago      Past Surgical History:   Procedure Laterality Date    HX OTHER SURGICAL Left 2008    Left eye cyst removal     No family history on file.    Social History     Tobacco Use    Smoking status: Every Day     Packs/day: 5.00     Years: 2.00     Pack years: 10.00     Types: Cigarettes    Smokeless tobacco: Never   Substance Use Topics    Alcohol use: Yes       Current Facility-Administered Medications   Medication Dose Route Frequency Provider Last Rate Last Admin    diphenhydrAMINE (BENADRYL) injection 50 mg  50 mg IntraMUSCular BID PRN Kerrie Hilton MD        hydrOXYzine HCL (ATARAX) tablet 50 mg  50 mg Oral TID PRN Kerrie Hilton MD        traZODone (DESYREL) tablet 50 mg  50 mg Oral QHS PRN Kerrie Hilton MD        acetaminophen (TYLENOL) tablet 650 mg  650 mg Oral Q4H PRN Flip Jasmine MD        magnesium hydroxide (MILK OF MAGNESIA) 400 mg/5 mL oral suspension 30 mL  30 mL Oral DAILY PRN Kerrie Hilton MD        nicotine (NICODERM CQ) 14 mg/24 hr patch 1 Patch  1 Patch TransDERmal DAILY Kerrie Hilton MD   1 Patch at 11/13/22 0853    ARIPiprazole (ABILIFY) tablet 5 mg  5 mg Oral DAILY Kerrie Hilton MD   5 mg at 11/13/22 0854    lacosamide (VIMPAT) tablet 200 mg  200 mg Oral BID Kerrie Hilton MD   200 mg at 11/13/22 0854    brivaracetam (BRIVIACT) tablet 100 mg  100 mg Oral BID Kerrie Hilton MD   100 mg at 11/13/22 0854    escitalopram oxalate (LEXAPRO) tablet 20 mg  20 mg Oral DAILY Kerrie Hilton MD   20 mg at 11/13/22 0854    mirtazapine (REMERON SOL-TAB) disintegrating tablet 15 mg  15 mg Oral QHS Dafne WINSLOW MD   15 mg at 11/12/22 9447        Allergies   Allergen Reactions    Citrus And Derivatives Hives    Lamictal [Lamotrigine] Nausea and Vomiting        Review of Systems:  Constitutional: Negative for chills and fever. HENT: Negative. Eyes: Negative. Respiratory: Negative. Cardiovascular: Negative. Gastrointestinal: Negative for abdominal pain and nausea. Skin: Negative. Neurological: Negative. Objective: Intake and Output:    No intake/output data recorded. No intake/output data recorded. Physical Exam:   Constitutional: pt is oriented to person, place, and time. HENT:   Head: Normocephalic and atraumatic. Eyes: Pupils are equal, round, and reactive to light. EOM are normal.   Cardiovascular: Normal rate, regular rhythm and normal heart sounds. Pulmonary/Chest: Breath sounds normal. No wheezes. No rales. Exhibits no tenderness. Abdominal: Soft. Bowel sounds are normal. There is no abdominal tenderness. There is no rebound and no guarding. Musculoskeletal: Normal range of motion. Neurological: pt is alert and oriented to person, place, and time. Alert. Normal strength. No cranial nerve deficit or sensory deficit. Displays a negative Romberg sign. Data Review:   No results found for this or any previous visit (from the past 24 hour(s)). CT HEAD WO CONT   Final Result   No acute intracranial abnormality. Dictation performed during downtime. No access to EMR. Any delay is due to   network issues.            Assessment:     Depression  Suicidal attempt  Asthma  Seizure disorder      Plan:   Follow-up with psychiatrist continue current medications

## 2022-11-13 NOTE — ED NOTES
Pt will be readmitted to [facility] once medically clear from seizure activity, pt belongings still at [facility].

## 2022-11-13 NOTE — BH NOTES
Patient pleasant, cooperative, and medication compliant. Patient denied SI, HI, AH, VH, and depression. She rated anxiety at a 5 on a scale of 0-10. She stated she was tired \"my room mate snores like a man. \"  Patient remains on close observation, Q 15 minute checks.

## 2022-11-13 NOTE — BH NOTES
The patient has been visible in the melieu with no signs of distress. She denies anxiety, depression, A/V hallucinations and SI/HI. The resident slept throughout the night with no issues.

## 2022-11-13 NOTE — ED PROVIDER NOTES
EMERGENCY DEPARTMENT HISTORY AND PHYSICAL EXAM      Date: 11/13/2022  Patient Name: Asher Leventhal    History of Presenting Illness     Chief Complaint   Patient presents with    Seizure       History Provided By: Patient    HPI: Asher Leventhal, 32 y.o. female with past medical history of cervical few below presents for evaluation following a syncopal episode. Patient endorses pain in her chin and lower teeth as well as generally in her neck and low back. Patient was admitted this hospital 2 days ago for seizure and was found facedown in her hospital room. Patient remembers walking into her room and states that she \"blacked out \"thereafter. Patient was placed in a c-collar and sent down to the emergency department. She states that her pain is improving significantly since the initial episode, no interventions thus far. She denies any motor weakness or change in sensation in her distal extremities. No witnessed seizure-like activity      There are no other complaints, changes, or physical findings at this time.     PCP: Ena Garrison MD    Current Facility-Administered Medications on File Prior to Encounter   Medication Dose Route Frequency Provider Last Rate Last Admin    [DISCONTINUED] diphenhydrAMINE (BENADRYL) injection 50 mg  50 mg IntraMUSCular BID PRN Giselle Garsia MD        [DISCONTINUED] hydrOXYzine HCL (ATARAX) tablet 50 mg  50 mg Oral TID PRN Giselle Garsia MD        [DISCONTINUED] traZODone (DESYREL) tablet 50 mg  50 mg Oral QHS PRN Giselle Garsia MD        [DISCONTINUED] acetaminophen (TYLENOL) tablet 650 mg  650 mg Oral Q4H PRN Giselle Garsia MD        [DISCONTINUED] magnesium hydroxide (MILK OF MAGNESIA) 400 mg/5 mL oral suspension 30 mL  30 mL Oral DAILY PRN Giselle Garsia MD        [DISCONTINUED] nicotine (NICODERM CQ) 14 mg/24 hr patch 1 Patch  1 Patch TransDERmal DAILY Giselle Garsia MD   1 Patch at 11/13/22 0853    [DISCONTINUED] ARIPiprazole (ABILIFY) tablet 5 mg  5 mg Oral DAILY Rohit WINSLOW MD   5 mg at 11/13/22 0854    [DISCONTINUED] lacosamide (VIMPAT) tablet 200 mg  200 mg Oral BID Rohit WINSLOW MD   200 mg at 11/13/22 0854    [DISCONTINUED] brivaracetam (BRIVIACT) tablet 100 mg  100 mg Oral BID Rohit WINSLOW MD   100 mg at 11/13/22 0854    [DISCONTINUED] escitalopram oxalate (LEXAPRO) tablet 20 mg  20 mg Oral DAILY Rohit WINSLOW MD   20 mg at 11/13/22 0854    [DISCONTINUED] mirtazapine (REMERON SOL-TAB) disintegrating tablet 15 mg  15 mg Oral QHS Rohit WINSLOW MD   15 mg at 11/12/22 2214     Current Outpatient Medications on File Prior to Encounter   Medication Sig Dispense Refill    lacosamide (Vimpat) 200 mg tab tablet Take 200 mg by mouth two (2) times a day.  mirtazapine (Remeron) 15 mg tablet Take 15 mg by mouth nightly.  brivaracetam (BRIVIACT) 100 mg tablet Take 100 mg by mouth two (2) times a day.  albuterol (PROVENTIL HFA, VENTOLIN HFA, PROAIR HFA) 90 mcg/actuation inhaler Take 2 Puffs by inhalation every four (4) hours as needed for Wheezing, Shortness of Breath or Respiratory Distress. 1 Inhaler 0    ARIPiprazole (ABILIFY) 5 mg tablet Take 1 Tablet by mouth daily. Indications: additional medications to treat depression 30 Tablet 0    escitalopram oxalate (LEXAPRO) 20 mg tablet Take 1 Tablet by mouth daily. Indications: major depressive disorder 30 Tablet 0    lacosamide (VIMPAT) 100 mg tab tablet Take 1 Tablet by mouth two (2) times a day. Max Daily Amount: 200 mg. 60 Tablet 0    levETIRAcetam 1,000 mg tablet Take 1 Tablet by mouth two (2) times a day. Indications: additional medication for tonic-clonic epilepsy (Patient not taking: Reported on 11/12/2022) 60 Tablet 0    traZODone (DESYREL) 50 mg tablet Take 1 Tablet by mouth nightly as needed for Sleep.  Indications: insomnia associated with depression (Patient not taking: Reported on 11/12/2022) 30 Tablet 0    guaiFENesin ER (MUCINEX) 600 mg ER tablet Take 1 Tablet by mouth two (2) times a day. (Patient not taking: Reported on 11/12/2022) 30 Tablet 0    venlafaxine-SR (EFFEXOR-XR) 150 mg capsule venlafaxine  mg capsule,extended release 24 hr   TK 1 C PO QD (Patient not taking: Reported on 11/12/2022)      ondansetron (ZOFRAN ODT) 8 mg disintegrating tablet Take 1 Tab by mouth every eight (8) hours as needed for Nausea. (Patient not taking: Reported on 11/12/2022) 30 Tab 2    omeprazole (PRILOSEC) 20 mg capsule omeprazole 20 mg capsule,delayed release (Patient not taking: No sig reported)      prenatal vit-calcium-iron-fa (PRENATAL PLUS with CALCIUM) 27 mg iron- 1 mg tab Take 1 Tab by mouth daily. Indications: PREGNANCY (Patient not taking: No sig reported)         Past History     Past Medical History:  Past Medical History:   Diagnosis Date    Anemia 11/13/2019    Asthma     [facility]     Seizures ([facility]     last seizure two weeks ago       Past Surgical History:  Past Surgical History:   Procedure Laterality Date    HX OTHER SURGICAL Left 2008    Left eye cyst removal       Family History:  History reviewed. No pertinent family history. Social History:  Social History     Tobacco Use    Smoking status: Every Day     Packs/day: 5.00     Years: 2.00     Pack years: 10.00     Types: Cigarettes    Smokeless tobacco: Never   Substance Use Topics    Alcohol use: Yes    Drug use: No       Allergies: Allergies   Allergen Reactions    Citrus And Derivatives Hives    Lamictal [Lamotrigine] Nausea and Vomiting       Review of Systems   Review of Systems   Constitutional:  Negative for fever. HENT:  Negative for congestion. Respiratory:  Negative for cough and shortness of breath. Cardiovascular:  Negative for chest pain. Gastrointestinal:  Negative for abdominal pain, constipation, nausea and vomiting. Genitourinary:  Negative for dysuria. Musculoskeletal:  Negative for arthralgias and myalgias. Skin:  Negative for rash. Allergic/Immunologic: Negative for immunocompromised state. Neurological:  Positive for seizures, syncope and headaches. Psychiatric/Behavioral:  Negative for confusion. Physical Exam   Physical Exam  Constitutional:       Appearance: Normal appearance. HENT:      Head: Normocephalic and atraumatic. Nose: Nose normal.      Mouth/Throat:      Mouth: Mucous membranes are moist.   Eyes:      Conjunctiva/sclera: Conjunctivae normal.      Pupils: Pupils are equal, round, and reactive to light. Neck:      Comments: C-collar in place  Cardiovascular:      Rate and Rhythm: Normal rate and regular rhythm. Heart sounds: Normal heart sounds. Pulmonary:      Effort: Pulmonary effort is normal.      Breath sounds: Normal breath sounds. Abdominal:      General: There is no distension. Palpations: Abdomen is soft. Tenderness: There is no abdominal tenderness. Musculoskeletal:         General: No tenderness or deformity. Normal range of motion. Cervical back: Normal range of motion and neck supple. Skin:     General: Skin is warm and dry. Neurological:      General: No focal deficit present. Mental Status: She is alert and oriented to person, place, and time. Sensory: No sensory deficit. Motor: No weakness. Psychiatric:         Mood and Affect: Mood normal.         Behavior: Behavior normal.        Lab and Diagnostic Study Results   Labs -     Recent Results (from the past 12 hour(s))   GLUCOSE, POC    Collection Time: 11/13/22  4:15 PM   Result Value Ref Range    Glucose (POC) 98 65 - 100 mg/dL    Performed by Jaron Lee        Radiologic Studies -   @lastxrresult@  CT Results  (Last 48 hours)                 11/11/22 3276  CT HEAD WO CONT Final result    Impression:  No acute intracranial abnormality. Dictation performed during downtime. No access to EMR. Any delay is due to   network issues. Narrative:  INDICATION: Seizure.        EXAM: CT HEAD WITHOUT CONTRAST.        COMPARISON: None. PROCEDURE: Sequential axial images of the head were performed without   intravenous contrast. Soft tissue and bone windows were examined. Images were   reformatted in the sagittal and coronal planes. .CT dose reduction was achieved   through use of a standardized protocol tailored for this examination and   automatic exposure control for dose modulation. FINDINGS: The brain parenchyma and ventricular system are unremarkable in   appearance for age. There is no parenchymal mass or hemorrhage and no shift of   midline structures or extra-axial collection. No obvious acute ischemia. No bony   abnormality. CXR Results  (Last 48 hours)      None            Medical Decision Making and ED Course   Differential Diagnosis & Medical Decision Making Provider Note:   49-year-old female presents for evaluation following a syncopal event. Suspect likely seizure given that this is what she is currently admitted for. Will rule out intracranial fracture/bleed or facial fracture with CT of the head and max face. Evaluating for cervical injury with CT of the cervical spine. Low suspicion for spinal injury given intact neurologic exam.  Treating pain with Lidoderm patch and Tylenol    - I am the first provider for this patient. I reviewed the vital signs, available nursing notes, past medical history, past surgical history, family history and social history. The patients presenting problems have been discussed, and they are in agreement with the care plan formulated and outlined with them. I have encouraged them to ask questions as they arise throughout their visit. Vital Signs-Reviewed the patient's vital signs.   Patient Vitals for the past 12 hrs:   Temp Pulse Resp BP SpO2   11/13/22 1640 98.1 °F (36.7 °C) 79 15 127/88 98 %       ED Course:   ED Course as of 11/13/22 2019   Sun Nov 13, 2022 1818 No acute pathology seen on CT scans of the head and neck [BQ]   1835 Medically cleared [BQ]      ED Course User Index  [BQ] Pancho Sanchez MD         Procedures   Performed by: Steph Brandt MD  Procedures      Disposition   Disposition: Admitted to Floor Medical Floor   Diagnosis/Clinical Impression     Clinical Impression:   1. Syncope and collapse    2. Minor head injury, initial encounter        Attestations: Steph NEW MD, am the primary clinician of record. Please note that this dictation was completed with [employer], the computer voice recognition software. Quite often unanticipated grammatical, syntax, homophones, and other interpretive errors are inadvertently transcribed by the computer software. Please disregard these errors. Please excuse any errors that have escaped final proofreading. Thank you.

## 2022-11-13 NOTE — BH NOTES
AT 16:10 patient was walking in her room, Lynette Mercer from [employer] [facility] was walking by and patient asked her the time. At 16:12 Kapil Landry LPN noticed patient lying prone on the floor, writer called, patient was unresponsive with pulse, Patient had a seizure while trying to obtain vital signs. Blood pressure 126/71, pulse 96, respirations 18, pulse ox 100%. Blood sugar noted at 98. Rapid response called,Patient had another seizure. Code team arrived at 16:15. Dr. Jessee Hollins ordered patient to go to ED. AT 16:19 patient started talking, complaining of neck and mouth pain. Blood pressure 125/84, pulse 82, respirations 20, pulse ox 100%. Renetta Waller RN assessed patient ablility to move extremities, able to move legs bilaterally, and able to squeeze Yessenia's fingers with both hands. 16:23  blood pressure noted at 123/80, pulse 84, respirations 20, temperature 97.7, and pulse ox 100%. 16:26 Patient turned over by Renetta Waller RN and Viktoriya Guerrero RN, nurse supervisor all while supporting the patient's C Spine. Patient tearful, c/o9 neck and mouth pain. Red areas noted to left side of her nose, mid to left chin, and mid to left lips. AT 16:29 C Collar placed and patient put on back board by Lauren Coelho and Alessandro RN's from ED. At 16:32 patient left unit via stretcher to the ED. Patient discharged from system.

## 2022-11-13 NOTE — PROGRESS NOTES
Spiritual Care Assessment/Progress Note  [facility]      NAME: Jeni Wyatt      MRN: [id number]  AGE: 32 y.o.  SEX: female  Denominational Affiliation: No preference   Language: English     11/13/2022     Total Time (in minutes): 35     Spiritual Assessment begun in [facility] 2  NON ACUTE through conversation with:         [x]Patient        [] Family    [] Friend(s)        Reason for Consult: Initial/Spiritual assessment, patient floor     Spiritual beliefs: (Please include comment if needed)     [] Identifies with a maddi tradition:         [] Supported by a maddi community:            [] Claims no spiritual orientation:         [x] Seeking spiritual identity:                [] Adheres to an individual form of spirituality:           [] Not able to assess:                           Identified resources for coping:      [] Prayer                               [] Music                  [] Guided Imagery     [x] Family/friends                 [] Pet visits     [] Devotional reading                         [] Unknown     [] Other:                                               Interventions offered during this visit: (See comments for more details)    Patient Interventions: Catharsis/review of pertinent events in supportive environment, Guidance concerning next steps/process to be expected, Initial/Spiritual assessment, patient floor, Bridging, Normalization of emotional/spiritual concerns, Affirmation of emotions/emotional suffering, Other (comment) (Meditation)           Plan of Care:     [] Support spiritual and/or cultural needs    [] Support AMD and/or advance care planning process      [] Support grieving process   [] Coordinate Rites and/or Rituals    [] Coordination with community clergy   [] No spiritual needs identified at this time   [] Detailed Plan of Care below (See Comments)  [] Make referral to Music Therapy  [] Make referral to Pet Therapy     [] Make referral to Addiction services  [] Make referral to [facility]  [] Make referral to Spiritual Care Partner  [] No future visits requested        [x] Contact Spiritual Care for further referrals     Comments: I went to visit with pt this afternoon to provide support and a spiritual assessment. Pt expressed feelings of overwhelm and fear, and we tied that to a spiritual need for life and freedom. I explored with pt what freedom might look like for her and explored a plan of action to find her freedom from pleasing others. Pt identified themselves as open to other beliefs and ideas spiritually, and I led pt in a meditation for support and self-love. I informed pt of  availability.     Please [facility]  in order to get in touch with  for any Spiritual Care Needs   [phone]    Signed by: Chaplain Lisandra

## 2022-11-13 NOTE — H&P
[facility] HISTORY AND PHYSICAL    Name:  Lan Blankenship  MR#:  [id number]  :  1995  ACCOUNT #:  [de-identified]  ADMIT DATE:  2022      This is a 80-year-old single  female patient admitted to [facility] Unit voluntarily from [address] [place] [address] ED.    CHIEF COMPLAINT:  Overdose of Vimpat. HISTORY OF PRESENT ILLNESS:  The patient says she was impulsive, overdosed, started throwing up pills, passed out. Next thing she remembers is her mother and EMS. Apparently, she left four different notes about saying goodbye to people, that she wanted to kill herself and took the pills and threw up. There were still pill fragments in the vomitus. Issues were living with her mother and financial issues. She has two children to raise, 6and 10years old, and has seizures. Boyfriend problems. She felt that mom was very controlling and sarcastic. Apparently, she was seeing a neurologist, taking V, and Lexapro. Also mirtazapine. She is still feeling suicidal, but also feels like it was a stupid behavior, critical of her. PAST HISTORY:  She has at least two admissions, overdoses in the past.  She was here one time. ALLERGIES TO MEDICATION:  CITRUS AND DERIVATIVES, LAMICTAL. PHYSICAL PROBLEMS:  Seizures. According to the mother, seizures are more consistent lately. Apparently, she had an overnight study. TRAUMA HISTORY:  Unknown. FAMILY HISTORY:  Unknown. MENTAL STATUS:  Average height, medium-built female patient, neat, clean, polite, but then she wonders why did she do this stupid thing and impulsive. No hallucination, no delusions. Thoughts are linear, logical, goal directed. Depressed. Basically felt that mom is critical of her, hard on her, and sometimes sarcastic, etc., and denied any further suicidal thoughts. Memory recall is fair. IQ about average. Insight is limited. Judgment is obviously poor by history, fair by testing.   No agitation. LABORATORY DATA:  Pregnancy test is negative. Drug screen:  Positive for THC, benzodiazepine. Urinalysis:  Blood large, protein 30, wbc's 5 to 10. Culture not indicated. COVID and influenza A and B not detected. Salicylate 1.7. Magnesium 2.1. Alcohol level 10. CK 95, within the range. DIAGNOSES:  Major depression, recurrent, acute, severe; overdose with pills; seizure disorder. DISPOSITION:  The patient needs inpatient level of care, close observation, Medical consult. Placed her on an antidepressant, seizure medication, stabilized. Individual therapy, group therapy, help learning coping skills, stress management. LENGTH OF STAY:  Five to seven days. Chart reviewed. Medication will be reconciled.         Haylie Garcia MD      [id number]  D:  11/13/2022 1:20  T:  11/13/2022 6:59  JOB #:  [id number]

## 2022-11-14 VITALS
TEMPERATURE: 98.3 F | DIASTOLIC BLOOD PRESSURE: 77 MMHG | BODY MASS INDEX: 35.15 KG/M2 | RESPIRATION RATE: 18 BRPM | HEART RATE: 71 BPM | WEIGHT: 205.91 LBS | HEIGHT: 64 IN | OXYGEN SATURATION: 100 % | SYSTOLIC BLOOD PRESSURE: 122 MMHG

## 2022-11-14 PROCEDURE — 74011250637 HC RX REV CODE- 250/637: Performed by: PSYCHIATRY & NEUROLOGY

## 2022-11-14 RX ADMIN — BRIVARACETAM 100 MG: 100 TABLET, FILM COATED ORAL at 10:34

## 2022-11-14 RX ADMIN — BRIVARACETAM 100 MG: 100 TABLET, FILM COATED ORAL at 01:23

## 2022-11-14 RX ADMIN — ARIPIPRAZOLE 5 MG: 5 TABLET ORAL at 10:35

## 2022-11-14 RX ADMIN — MIRTAZAPINE 15 MG: 15 TABLET, FILM COATED ORAL at 01:23

## 2022-11-14 RX ADMIN — ESCITALOPRAM OXALATE 20 MG: 10 TABLET ORAL at 10:34

## 2022-11-14 RX ADMIN — LACOSAMIDE 200 MG: 100 TABLET, FILM COATED ORAL at 10:34

## 2022-11-14 RX ADMIN — LACOSAMIDE 200 MG: 100 TABLET, FILM COATED ORAL at 01:23

## 2022-11-14 NOTE — PROGRESS NOTES
Problem: Suicide  Goal: *STG: Remains safe in hospital  Outcome: Progressing Towards Goal     Patient has been safe so far this shift.

## 2022-11-14 NOTE — ED NOTES
TRANSFER - OUT REPORT:    Verbal report given to [facility] RN(name) on Kiko Farley  being transferred to [address] [place] [address] (unit) for routine progression of care       Report consisted of patients Situation, Background, Assessment and   Recommendations(SBAR). Information from the following report(s) SBAR and ED Summary was reviewed with the receiving nurse. Lines:       Opportunity for questions and clarification was provided.       Patient transported with:   [employer]

## 2022-11-14 NOTE — GROUP NOTE
IP  GROUP DOCUMENTATION INDIVIDUAL                                                                          Group Therapy Note    Date: 11/14/2022    Group Start Time: 2176  Group End Time: 1475  Group Topic: Recreational/Music Therapy    [facility] 2  NON ACUTE    Sina Kussmaul    IP [facility] GROUP DOCUMENTATION GROUP    Group Therapy Note    Facilitated leisure skills group to reinforce positive coping and to manage mood through music, social interaction, group activities and art task    Attendees: 9/13       Attendance: Did not attend    Additional Notes:  Encouraged but did not attend    JUANITA Martinez

## 2022-11-14 NOTE — BH NOTES
Behavioral Health Transition Record to Provider    Patient Name: Cecelia Trevizo  YOB: 1995  Medical Record Number: [id number]  Date of Admission: 11/13/2022  Date of Discharge: 11.14.22    Attending Provider: Madalyn Maedows MD  Discharging Provider: Dr Ran Sandoval  To contact this individual call [phone] and ask the  to page. If unavailable, ask to be transferred to [facility] Provider on call. [facility] Provider will be available on call 24/7 and during holidays. Primary Care Provider: Stephanie Sanchez MD    Allergies   Allergen Reactions    Citrus And Derivatives Hives    Lamictal [Lamotrigine] Nausea and Vomiting       Reason for Admission: Pt was admitted after taking an overdose and then making herself throw up afterwards. Admission Diagnosis: Depression with suicidal ideation [F32. A, R45.851]    * No surgery found *    Results for orders placed or performed during the hospital encounter of 11/11/22   COVID-19 WITH INFLUENZA A/B   Result Value Ref Range    SARS-CoV-2 by PCR Not Detected Not Detected      Influenza A by PCR Not Detected Not Detected      Influenza B by PCR Not Detected Not Detected     CBC WITH AUTOMATED DIFF   Result Value Ref Range    WBC 5.6 3.6 - 11.0 K/uL    RBC 4.13 3.80 - 5.20 M/uL    HGB 10.9 (L) 11.5 - 16.0 g/dL    HCT 34.1 (L) 35.0 - 47.0 %    MCV 82.6 80.0 - 99.0 FL    MCH 26.4 26.0 - 34.0 PG    MCHC 32.0 30.0 - 36.5 g/dL    RDW 15.3 (H) 11.5 - 14.5 %    PLATELET 300 556 - 445 K/uL    MPV 11.2 8.9 - 12.9 FL    NRBC 0.0 0.0  WBC    ABSOLUTE NRBC 0.00 0.00 - 0.01 K/uL    NEUTROPHILS 59 32 - 75 %    LYMPHOCYTES 31 12 - 49 %    MONOCYTES 7 5 - 13 %    EOSINOPHILS 2 0 - 7 %    BASOPHILS 1 0 - 1 %    IMMATURE GRANULOCYTES 0 0 - 0.5 %    ABS. NEUTROPHILS 3.3 1.8 - 8.0 K/UL    ABS. LYMPHOCYTES 1.7 0.8 - 3.5 K/UL    ABS. MONOCYTES 0.4 0.0 - 1.0 K/UL    ABS. EOSINOPHILS 0.1 0.0 - 0.4 K/UL    ABS. BASOPHILS 0.1 0.0 - 0.1 K/UL    ABS. IMM. GRANS. 0.0 0.00 - 0.04 K/UL    DF AUTOMATED     METABOLIC PANEL, COMPREHENSIVE   Result Value Ref Range    Sodium 140 136 - 145 mmol/L    Potassium 4.2 3.5 - 5.1 mmol/L    Chloride 109 (H) 97 - 108 mmol/L    CO2 24 21 - 32 mmol/L    Anion gap 7 5 - 15 mmol/L    Glucose 91 65 - 100 mg/dL    BUN 10 6 - 20 mg/dL    Creatinine 0.71 0.55 - 1.02 mg/dL    BUN/Creatinine ratio 14 12 - 20      eGFR >60 >60 ml/min/1.73m2    Calcium 8.8 8.5 - 10.1 mg/dL    Bilirubin, total 0.1 (L) 0.2 - 1.0 mg/dL    AST (SGOT) 14 (L) 15 - 37 U/L    ALT (SGPT) 17 12 - 78 U/L    Alk.  phosphatase 70 45 - 117 U/L    Protein, total 7.2 6.4 - 8.2 g/dL    Albumin 3.4 (L) 3.5 - 5.0 g/dL    Globulin 3.8 2.0 - 4.0 g/dL    A-G Ratio 0.9 (L) 1.1 - 2.2     URINALYSIS W/ REFLEX CULTURE    Specimen: Urine   Result Value Ref Range    Color Yellow/Straw      Appearance Clear Clear      Specific gravity 1.028 1.003 - 1.030      pH (UA) 6.0 5.0 - 8.0      Protein 30 (A) Negative mg/dL    Glucose Negative Negative mg/dL    Ketone Negative Negative mg/dL    Bilirubin Negative Negative      Blood Large (A) Negative      Urobilinogen 0.1 0.1 - 1.0 EU/dL    Nitrites Negative Negative      Leukocyte Esterase Negative Negative      WBC 5-10 0 - 4 /hpf    RBC  0 - 5 /hpf    Bacteria Negative Negative /hpf    UA:UC IF INDICATED Culture not indicated by UA result Culture not indicated by UA result      Mucus 3+ (A) Negative /lpf   DRUG SCREEN, URINE   Result Value Ref Range    AMPHETAMINES Negative Negative      BARBITURATES Negative Negative      BENZODIAZEPINES Positive (A) Negative      COCAINE Negative Negative      METHADONE Negative Negative      OPIATES Negative Negative      PCP(PHENCYCLIDINE) Negative Negative      THC (TH-CANNABINOL) Positive (A) Negative      Drug screen comment        This test is a screen for drugs of abuse in a medical setting only (i.e., they are unconfirmed results and as such must not be used for non-medical purposes, e.g.,employment testing, legal testing). Due to its inherent nature, false positive (FP) and false negative (FN) results may be obtained. Therefore, if necessary for medical care, recommend confirmation of positive findings by GC/MS. ACETAMINOPHEN   Result Value Ref Range    Acetaminophen level <10 (L) 10 - 30 ug/mL   CK   Result Value Ref Range    CK 95 26 - 192 U/L   ETHYL ALCOHOL   Result Value Ref Range    ALCOHOL(ETHYL),SERUM <10 <10 mg/dL   MAGNESIUM   Result Value Ref Range    Magnesium 2.1 1.6 - 2.4 mg/dL   SALICYLATE   Result Value Ref Range    Salicylate level <5.2 (L) 2.8 - 20.0 mg/dL   HCG URINE, QL   Result Value Ref Range    HCG urine, QL Negative Negative     GLUCOSE, POC   Result Value Ref Range    Glucose (POC) 98 65 - 100 mg/dL    Performed by Juancho Kyle    EKG, 12 LEAD, INITIAL   Result Value Ref Range    Ventricular Rate 85 BPM    Atrial Rate 85 BPM    P-R Interval 166 ms    QRS Duration 84 ms    Q-T Interval 352 ms    QTC Calculation (Bezet) 418 ms    Calculated P Axis 25 degrees    Calculated R Axis 29 degrees    Calculated T Axis 10 degrees    Diagnosis       Normal sinus rhythm  Normal ECG  When compared with ECG of 07-AUG-2022 09:16,  No significant change was found  Confirmed by Humphrey Wells MD, Malinda Ward ([id number]) on 11/12/2022 3:08:38 PM         Immunizations administered during this encounter:   Immunization History   Administered Date(s) Administered    Influenza High Dose Vaccine PF 12/15/2017    Influenza Vaccine PF 10/19/2010    Influenza, AFLURIA (age 10-32 mo), IM, MDV, 0.25 mL, Fluzone (age 10 mo+), AFLURIA (age 1 y+), IM, MDV, 0.5mL 11/07/2019       Screening for Metabolic Disorders for Patients on Antipsychotic Medications  (Data obtained from the EMR)    Estimated Body Mass Index  Estimated body mass index is 35.34 kg/m² as calculated from the following:    Height as of this encounter: 5' 4\" (1.626 m). Weight as of this encounter: 93.4 kg (205 lb 14.6 oz).      Vital Signs/Blood Pressure  Visit Vitals  /77 (BP 1 Location: Left upper arm, BP Patient Position: At rest;Sitting)   Pulse 71   Temp 98.3 °F (36.8 °C)   Resp 18   Ht 5' 4\" (1.626 m)   Wt 93.4 kg (205 lb 14.6 oz)   LMP 07/11/2022 (Exact Date)   SpO2 100%   Breastfeeding No   BMI 35.34 kg/m²       Blood Glucose/Hemoglobin A1c  Lab Results   Component Value Date/Time    Glucose 91 11/11/2022 05:06 PM    Glucose (POC) 98 11/13/2022 04:15 PM       No results found for: HBA1C, [id number]     Lipid Panel  No results found for: CHOL, CHOLX, CHLST, CHOLV, 060366, HDL, HDLP, LDL, LDLC, DLDLP, TGLX, TRIGL, TRIGP, CHHD, CHHDX     Discharge Diagnosis: depression with suicidal ideation    Discharge Plan: Pt is leaving AMA after not wanting further inpatient treatment. Pts mother, Yazan Gillespie, verbally contracted for safety for pt for next 72hrs. Discharge Medication List and Instructions:   Current Discharge Medication List          Unresulted Labs (24h ago, onward)      None          To obtain results of studies pending at discharge, please contact [phone]    Follow-up Information       Follow up With Specialties Details [place] [address]  Call As needed [phone] [address] [facility] [address]            Advanced Directive:   Does the patient have an appointed surrogate decision maker? No  Does the patient have a Medical Advance Directive? No  Does the patient have a Psychiatric Advance Directive? No  If the patient does not have a surrogate or Medical Advance Directive AND Psychiatric Advance Directive, the patient was offered information on these advance directives Patient will complete at a later time    Patient Instructions: Please continue all medications until otherwise directed by physician. Tobacco Cessation Discharge Plan:   Is the patient a smoker and needs referral for smoking cessation? Not applicable  Patient referred to the following for smoking cessation with an appointment?  Not applicable Patient was offered medication to assist with smoking cessation at discharge? Not applicable  Was education for smoking cessation added to the discharge instructions? Not applicable    Alcohol/Substance Abuse Discharge Plan:   Does the patient have a history of substance/alcohol abuse and requires a referral for treatment? Not applicable  Patient referred to the following for substance/alcohol abuse treatment with an appointment? Not applicable  Patient was offered medication to assist with alcohol cessation at discharge? Not applicable  Was education for substance/alcohol abuse added to discharge instructions? Not applicable    Patient discharged to Home; provided to the patient/caregiver either in hard copy or electronically.

## 2022-11-14 NOTE — BH NOTES
Dayshift/Discharge AMA    Pt up ad laurent calm, cooperative. Pt states she wants to leave AMA.  called pt mother and mother contracted to safety of pt and pt also contracted to safety. Per Dr. Paige Thapa, pt safe to discharge AMA. Writer explained process to pt. Pt denied SI/HI/AVH/depression/anxiety/pain. Pt has fair insight to behavior and states that she has a history of borderline personality disorder and suicide attempts. Pt identified coping skills and support systems. Pt understands risk of signing out AMA and agreed. Pt signed [place] [employer] paperwork and is placed in pt chart. Pt signed for items in storage and confirmed she had no items in safe. Pt had no home medications in pharmacy. Pt walked out the main entrance of hospital by writer at 1806 by Perez Moore and pt was picked up by mother.
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
## 2022-11-14 NOTE — BH NOTES
Writer spoke with pts mother, Magali Davis. She reports that she wants the pt home and that her kids are missing her. Van Fernandez lives with the pt and verbally contracted for safety for the next 72hrs.  Writer spoke with Dr Meghann Coronado who said to let pt leave AMA

## 2022-11-14 NOTE — PROGRESS NOTES
Ramila Lockett actively participated in Spirituality Group about what helps your spirit on non-acute [address]  [place]  Please [place]  in order to get in touch with  for any Spiritual Care Needs   [phone]

## 2022-11-14 NOTE — GROUP NOTE
IP  GROUP DOCUMENTATION INDIVIDUAL                                                                          Group Therapy Note    Date: 11/14/2022    Group Start Time: 0937  Group End Time: 5594  Group Topic: Education Group - Inpatient    [facility] 2  NON ACUTE    Tristan Dennis    IP [facility] GROUP DOCUMENTATION GROUP    Group Therapy Note    Facilitated group to introduce information on the cognitive triangle and discuss how thoughts about a situation, emotions and behaviors affect one another    Attendees: 11/13       Attendance: Attended    Patient's Goal:  Attend group daily     Interventions/techniques: Informed and Supported    Follows Directions: Followed directions    Interactions: Interacted appropriately    Mental Status: Calm    Behavior/appearance: Cooperative    Goals Achieved: Able to engage in interactions, Able to listen to others, Able to reflect/comment on own behavior, Able to self-disclose, and Discussed coping      Additional Notes:  Receptive to information discussed and engaged.  Acknowledged thinking negative and engaged in negative behavior prior to admission    Merna Alvarado

## 2022-11-14 NOTE — BH NOTES
DISCHARGE SUMMARY    NAME:Xochilt Stark  : 1995  MRN: [id number]    The patient Nayeli Parrish exhibits the ability to control behavior in a less restrictive environment. Patient's level of functioning is improving. No assaultive/destructive behavior has been observed for the past 24 hours. No suicidal/homicidal threat or behavior has been observed for the past 24 hours. There is no evidence of serious medication side effects. Patient has not been in physical or protective restraints for at least the past 24 hours. If weapons involved, how are they secured? N/a    Is patient aware of and in agreement with discharge plan? Yes, pt is leaving AMA due to not wanting to participate in inpatient treatment at this [facility] 157 for medication:  Prescriptions will be sent to pharmacy in chart    Copy of discharge instructions to provider?:  yes    Arrangements for transportation home:  pt will arrange own transportation    Keep all follow up appointments as scheduled, continue to take prescribed medications per physician instructions.   Mental health crisis number:  [phone] or your local mental health crisis line number at [place] [address] Emergency WARM LINE      [phone] ([phone])      M-F: 9am to 9pm      Sat & Sun: 5pm - 9pm  National suicide prevention lines:                             4-503-FMKCETP (6-080-272-0993)       [phone]-TALK ([phone])    Crisis Text Line:  Text HOME to [phone]

## 2022-11-14 NOTE — BH NOTES
Patient was admitted to [address] [place] [address] from the ER. Patient was previously on [address] [place] [address]. She had a seizure and was sent to the ER. Patient was originally admitted to [address] [place] [address] for Major Depression, Acute, Severe, Suicidal Ideation and Seizure Disorder. Patient had stated she was impulsive and overdosed. She left 4 different suicide notes saying good-bye to people. Patient has two children, ages 6 and six. On 11/13/2022 patient was found on the floor, in the prone position, on [address] [place] [address]. She started to have seizure-activity symptoms. She was taken the ER for treatment and was eventually medically cleared. On this second admission to [address] [place] [address], patient was searched by 2 female staff. Patient was alert, oriented, cooperative and pleasant. Good eye contact. Speech was WNL. Thoughts were logical.    History of hyperlipidemia, asthma, seizure disorder, Bipolar Disorder, anemia, antepartum hemorrhaging, gastritis, drug abuse and Major Depressive Disorder. Allergies to Citrus and Lamictal.    Patient vapes about the equivalent of 1/2 pack of cigarettes per day.

## 2022-11-24 ENCOUNTER — HOSPITAL ENCOUNTER (EMERGENCY)
Age: 27
Discharge: HOME | End: 2022-11-25
Attending: STUDENT IN AN ORGANIZED HEALTH CARE EDUCATION/TRAINING PROGRAM
Payer: MEDICAID

## 2022-11-24 VITALS
BODY MASS INDEX: 33.8 KG/M2 | HEART RATE: 74 BPM | RESPIRATION RATE: 17 BRPM | HEIGHT: 64 IN | WEIGHT: 198 LBS | DIASTOLIC BLOOD PRESSURE: 78 MMHG | OXYGEN SATURATION: 97 % | SYSTOLIC BLOOD PRESSURE: 105 MMHG | TEMPERATURE: 97.7 F

## 2022-11-24 DIAGNOSIS — G40.919 BREAKTHROUGH SEIZURE (HCC): Primary | ICD-10-CM

## 2022-11-24 LAB
ALBUMIN SERPL-MCNC: 3.1 G/DL (ref 3.5–5)
ALBUMIN/GLOB SERPL: 0.8 {RATIO} (ref 1.1–2.2)
ALP SERPL-CCNC: 81 U/L (ref 45–117)
ALT SERPL-CCNC: ABNORMAL U/L (ref 12–78)
ANION GAP SERPL CALC-SCNC: 0 MMOL/L (ref 5–15)
AST SERPL W P-5'-P-CCNC: ABNORMAL U/L (ref 15–37)
BASOPHILS # BLD: 0.1 K/UL (ref 0–0.1)
BASOPHILS NFR BLD: 1 % (ref 0–1)
BILIRUB SERPL-MCNC: 0.2 MG/DL (ref 0.2–1)
BUN SERPL-MCNC: 9 MG/DL (ref 6–20)
BUN/CREAT SERPL: 11 (ref 12–20)
CA-I BLD-MCNC: 8.7 MG/DL (ref 8.5–10.1)
CHLORIDE SERPL-SCNC: 103 MMOL/L (ref 97–108)
CO2 SERPL-SCNC: 27 MMOL/L (ref 21–32)
CREAT SERPL-MCNC: 0.85 MG/DL (ref 0.55–1.02)
DIFFERENTIAL METHOD BLD: ABNORMAL
EOSINOPHIL # BLD: 0.2 K/UL (ref 0–0.4)
EOSINOPHIL NFR BLD: 3 % (ref 0–7)
ERYTHROCYTE [DISTWIDTH] IN BLOOD BY AUTOMATED COUNT: 17.1 % (ref 11.5–14.5)
GLOBULIN SER CALC-MCNC: 4.1 G/DL (ref 2–4)
GLUCOSE SERPL-MCNC: 85 MG/DL (ref 65–100)
HCG SERPL QL: NEGATIVE
HCT VFR BLD AUTO: 34.2 % (ref 35–47)
HGB BLD-MCNC: 10.6 G/DL (ref 11.5–16)
IMM GRANULOCYTES # BLD AUTO: 0 K/UL (ref 0–0.04)
IMM GRANULOCYTES NFR BLD AUTO: 0 % (ref 0–0.5)
LYMPHOCYTES # BLD: 2.1 K/UL (ref 0.8–3.5)
LYMPHOCYTES NFR BLD: 30 % (ref 12–49)
MCH RBC QN AUTO: 25.9 PG (ref 26–34)
MCHC RBC AUTO-ENTMCNC: 31 G/DL (ref 30–36.5)
MCV RBC AUTO: 83.6 FL (ref 80–99)
MONOCYTES # BLD: 0.4 K/UL (ref 0–1)
MONOCYTES NFR BLD: 6 % (ref 5–13)
NEUTS SEG # BLD: 4.2 K/UL (ref 1.8–8)
NEUTS SEG NFR BLD: 60 % (ref 32–75)
NRBC # BLD: 0 K/UL (ref 0–0.01)
NRBC BLD-RTO: 0 PER 100 WBC
PLATELET # BLD AUTO: 331 K/UL (ref 150–400)
PMV BLD AUTO: 11.5 FL (ref 8.9–12.9)
POTASSIUM SERPL-SCNC: ABNORMAL MMOL/L (ref 3.5–5.1)
PROT SERPL-MCNC: 7.2 G/DL (ref 6.4–8.2)
RBC # BLD AUTO: 4.09 M/UL (ref 3.8–5.2)
SODIUM SERPL-SCNC: 130 MMOL/L (ref 136–145)
WBC # BLD AUTO: 7 K/UL (ref 3.6–11)

## 2022-11-24 PROCEDURE — 36415 COLL VENOUS BLD VENIPUNCTURE: CPT

## 2022-11-24 PROCEDURE — 74011250637 HC RX REV CODE- 250/637: Performed by: STUDENT IN AN ORGANIZED HEALTH CARE EDUCATION/TRAINING PROGRAM

## 2022-11-24 PROCEDURE — 80053 COMPREHEN METABOLIC PANEL: CPT

## 2022-11-24 PROCEDURE — 93005 ELECTROCARDIOGRAM TRACING: CPT

## 2022-11-24 PROCEDURE — 85025 COMPLETE CBC W/AUTO DIFF WBC: CPT

## 2022-11-24 PROCEDURE — 74011250636 HC RX REV CODE- 250/636: Performed by: STUDENT IN AN ORGANIZED HEALTH CARE EDUCATION/TRAINING PROGRAM

## 2022-11-24 PROCEDURE — 84703 CHORIONIC GONADOTROPIN ASSAY: CPT

## 2022-11-24 PROCEDURE — 99284 EMERGENCY DEPT VISIT MOD MDM: CPT

## 2022-11-24 RX ORDER — ACETAMINOPHEN 500 MG
1000 TABLET ORAL
Status: COMPLETED | OUTPATIENT
Start: 2022-11-24 | End: 2022-11-24

## 2022-11-24 RX ADMIN — SODIUM CHLORIDE 1000 ML: 9 INJECTION, SOLUTION INTRAVENOUS at 22:21

## 2022-11-24 RX ADMIN — ACETAMINOPHEN 1000 MG: 500 TABLET ORAL at 22:21

## 2022-11-25 ENCOUNTER — HOSPITAL ENCOUNTER (EMERGENCY)
Age: 27
Discharge: HOME | End: 2022-11-25
Attending: EMERGENCY MEDICINE
Payer: MEDICAID

## 2022-11-25 VITALS
RESPIRATION RATE: 18 BRPM | OXYGEN SATURATION: 99 % | TEMPERATURE: 98.2 F | DIASTOLIC BLOOD PRESSURE: 89 MMHG | BODY MASS INDEX: 33.8 KG/M2 | HEIGHT: 64 IN | HEART RATE: 60 BPM | SYSTOLIC BLOOD PRESSURE: 128 MMHG | WEIGHT: 198 LBS

## 2022-11-25 DIAGNOSIS — R56.9 SEIZURE (HCC): Primary | ICD-10-CM

## 2022-11-25 LAB
ALBUMIN SERPL-MCNC: 2.8 G/DL (ref 3.5–5)
ALBUMIN/GLOB SERPL: 0.9 {RATIO} (ref 1.1–2.2)
ALP SERPL-CCNC: 73 U/L (ref 45–117)
ALT SERPL-CCNC: 14 U/L (ref 12–78)
ANION GAP SERPL CALC-SCNC: 5 MMOL/L (ref 5–15)
AST SERPL W P-5'-P-CCNC: 10 U/L (ref 15–37)
ATRIAL RATE: 80 BPM
BILIRUB SERPL-MCNC: <0.1 MG/DL (ref 0.2–1)
BUN SERPL-MCNC: 8 MG/DL (ref 6–20)
BUN/CREAT SERPL: 11 (ref 12–20)
CA-I BLD-MCNC: 8.8 MG/DL (ref 8.5–10.1)
CALCULATED P AXIS, ECG09: 23 DEGREES
CALCULATED R AXIS, ECG10: 31 DEGREES
CALCULATED T AXIS, ECG11: 14 DEGREES
CHLORIDE SERPL-SCNC: 107 MMOL/L (ref 97–108)
CO2 SERPL-SCNC: 28 MMOL/L (ref 21–32)
CREAT SERPL-MCNC: 0.72 MG/DL (ref 0.55–1.02)
DIAGNOSIS, 93000: NORMAL
GLOBULIN SER CALC-MCNC: 3.1 G/DL (ref 2–4)
GLUCOSE SERPL-MCNC: 92 MG/DL (ref 65–100)
P-R INTERVAL, ECG05: 150 MS
POTASSIUM SERPL-SCNC: 4 MMOL/L (ref 3.5–5.1)
PROT SERPL-MCNC: 5.9 G/DL (ref 6.4–8.2)
Q-T INTERVAL, ECG07: 360 MS
QRS DURATION, ECG06: 80 MS
QTC CALCULATION (BEZET), ECG08: 415 MS
SODIUM SERPL-SCNC: 140 MMOL/L (ref 136–145)
VENTRICULAR RATE, ECG03: 80 BPM

## 2022-11-25 PROCEDURE — 74011250636 HC RX REV CODE- 250/636: Performed by: EMERGENCY MEDICINE

## 2022-11-25 PROCEDURE — 93005 ELECTROCARDIOGRAM TRACING: CPT

## 2022-11-25 PROCEDURE — 74011250637 HC RX REV CODE- 250/637: Performed by: EMERGENCY MEDICINE

## 2022-11-25 RX ORDER — LEVETIRACETAM 500 MG/1
500 TABLET ORAL 2 TIMES DAILY
Qty: 60 TABLET | Refills: 0 | Status: SHIPPED | OUTPATIENT
Start: 2022-11-25 | End: 2022-12-25

## 2022-11-25 RX ORDER — ACETAMINOPHEN 500 MG
1000 TABLET ORAL ONCE
Status: COMPLETED | OUTPATIENT
Start: 2022-11-25 | End: 2022-11-25

## 2022-11-25 RX ORDER — LEVETIRACETAM 500 MG/5ML
1000 INJECTION, SOLUTION, CONCENTRATE INTRAVENOUS ONCE
Status: COMPLETED | OUTPATIENT
Start: 2022-11-25 | End: 2022-11-25

## 2022-11-25 RX ORDER — LACOSAMIDE 100 MG/1
100 TABLET ORAL
Status: DISCONTINUED | OUTPATIENT
Start: 2022-11-25 | End: 2022-11-25

## 2022-11-25 RX ADMIN — SODIUM CHLORIDE 1000 ML: 9 INJECTION, SOLUTION INTRAVENOUS at 14:53

## 2022-11-25 RX ADMIN — LEVETIRACETAM 1000 MG: 100 INJECTION INTRAVENOUS at 14:54

## 2022-11-25 RX ADMIN — ACETAMINOPHEN 1000 MG: 500 TABLET ORAL at 14:46

## 2022-11-25 NOTE — ED NOTES
Assumed care of patient. Bedside shift report received from Dany Bernard RN. Pt in bed resting quietly with no complaints at this time.

## 2022-11-25 NOTE — ED PROVIDER NOTES
EMERGENCY DEPARTMENT HISTORY AND PHYSICAL EXAM      Date: 11/25/2022  Patient Name: Alicia Hightower    History of Presenting Illness     Chief Complaint   Patient presents with    Seizure       History Provided By: Patient    HPI: Alicia Hightower, 32 y.o. female with history of epilepsy, anemia, depression, and asthma who presents with seizure-like activity. States it happened just prior to arrival.  She was walking to the bathroom. She was in the family room. She suddenly lost consciousness. She was on the floor for about 10 minutes and had intermittent seizures that were witnessed by her sister. She was shaking all over. When she woke up she was tired and had a headache on the left side that is constant, nonradiating, and without exacerbating symptoms. Denies any fevers. States that she did have multiple seizures yesterday as well and was seen in the ER. There are no other complaints, changes, or physical findings at this time. PCP: Alex Paul MD    Current Outpatient Medications   Medication Sig Dispense Refill    levETIRAcetam (Keppra) 500 mg tablet Take 1 Tablet by mouth two (2) times a day for 30 days. 60 Tablet 0    lacosamide (VIMPAT) 200 mg tab tablet Take 200 mg by mouth two (2) times a day.  mirtazapine (REMERON) 15 mg tablet Take 15 mg by mouth nightly.  brivaracetam (BRIVIACT) 100 mg tablet Take 100 mg by mouth two (2) times a day.  albuterol (PROVENTIL HFA, VENTOLIN HFA, PROAIR HFA) 90 mcg/actuation inhaler Take 2 Puffs by inhalation every four (4) hours as needed for Wheezing, Shortness of Breath or Respiratory Distress. (Patient not taking: Reported on 11/13/2022) 1 Inhaler 0    ARIPiprazole (ABILIFY) 5 mg tablet Take 1 Tablet by mouth daily. Indications: additional medications to treat depression 30 Tablet 0    escitalopram oxalate (LEXAPRO) 20 mg tablet Take 1 Tablet by mouth daily.  Indications: major depressive disorder 30 Tablet 0    lacosamide (VIMPAT) 100 mg tab tablet Take 1 Tablet by mouth two (2) times a day. Max Daily Amount: 200 mg. 60 Tablet 0    levETIRAcetam 1,000 mg tablet Take 1 Tablet by mouth two (2) times a day. Indications: additional medication for tonic-clonic epilepsy (Patient not taking: No sig reported) 60 Tablet 0    traZODone (DESYREL) 50 mg tablet Take 1 Tablet by mouth nightly as needed for Sleep. Indications: insomnia associated with depression (Patient not taking: No sig reported) 30 Tablet 0    guaiFENesin ER (MUCINEX) 600 mg ER tablet Take 1 Tablet by mouth two (2) times a day. (Patient not taking: No sig reported) 30 Tablet 0    venlafaxine-SR (EFFEXOR-XR) 150 mg capsule venlafaxine  mg capsule,extended release 24 hr   TK 1 C PO QD (Patient not taking: No sig reported)      ondansetron (ZOFRAN ODT) 8 mg disintegrating tablet Take 1 Tab by mouth every eight (8) hours as needed for Nausea. (Patient not taking: No sig reported) 30 Tab 2    omeprazole (PRILOSEC) 20 mg capsule omeprazole 20 mg capsule,delayed release (Patient not taking: No sig reported)      prenatal vit-calcium-iron-fa (PRENATAL PLUS with CALCIUM) 27 mg iron- 1 mg tab Take 1 Tab by mouth daily. Indications: PREGNANCY (Patient not taking: No sig reported)         Past History   Past Medical History:  Past Medical History:   Diagnosis Date    Anemia 11/13/2019    Asthma     Depression     Epilepsy ([facility] [address]     Mood disorder (HCC)     Seizures (HCC)     last seizure two weeks ago    Sleep disorder     Suicidal thoughts         Past Surgical History:  Past Surgical History:   Procedure Laterality Date    HX OTHER SURGICAL Left 2008    Left eye cyst removal       Family History:  No family history on file. Social History:  Social History     Tobacco Use    Smoking status: Every Day    Smokeless tobacco: Current   Substance Use Topics    Alcohol use: Yes    Drug use: No       Allergies:   Allergies   Allergen Reactions    Citrus And Derivatives Hives  Lamictal [Lamotrigine] Nausea and Vomiting        Review of Systems   Review of Systems   Constitutional:  Negative for fever. HENT:  Negative for congestion. Eyes:  Negative for visual disturbance. Respiratory:  Negative for shortness of breath. Cardiovascular:  Negative for chest pain. Gastrointestinal:  Negative for abdominal pain. Genitourinary:  Negative for dysuria. Musculoskeletal:  Negative for arthralgias. Skin:  Negative for rash. Neurological:  Positive for seizures and headaches. Physical Exam   Constitutional: No acute distress. Well-nourished. Skin: No rash. ENT: No rhinorrhea. No cough. Head is normocephalic and atraumatic. Eye: No proptosis or conjunctival injections. Respiratory: No apparent respiratory distress. Lung sounds are clear. Gastrointestinal: Nondistended. Soft and nontender. Musculoskeletal: No obvious bony deformities. Cardiac: Regular rate and rhythm. 2+ radial pulses. No murmurs. Neurological: Alert but slightly drowsy. Oriented. No focal deficits. Moves all 4 extremities normal.  Cranial nerves intact. Lab and Diagnostic Study Results   Labs -   No results found for this or any previous visit (from the past 12 hour(s)). Radiologic Studies -   [unfilled]  CT Results  (Last 48 hours)      None          CXR Results  (Last 48 hours)      None            Medical Decision Making and ED Course   - I am the first and primary provider for this patient AND AM THE PRIMARY PROVIDER OF RECORD. I reviewed the vital signs, available nursing notes, past medical history, past surgical history, family history and social history. - Initial assessment performed. The patients presenting problems have been discussed, and the staff are in agreement with the care plan formulated and outlined with them. I have encouraged them to ask questions as they arise throughout their visit. Vital Signs-Reviewed the patient's vital signs.   Patient Vitals for the past 12 hrs:   Temp Pulse Resp BP SpO2   11/25/22 1503 -- 60 18 128/89 99 %   11/25/22 1448 -- -- -- 117/83 99 %   11/25/22 1433 -- -- -- (!) 127/92 99 %   11/25/22 1418 -- -- -- 126/87 99 %   11/25/22 1403 -- -- -- (!) 122/93 100 %   11/25/22 1348 -- -- -- (!) 123/90 99 %   11/25/22 1333 -- -- -- 115/77 98 %   11/25/22 1332 98.2 °F (36.8 °C) 66 18 115/77 99 %       EKG interpretation: (Preliminary): EKG Interpreted by ED physician. Obtained 11/25/2022 at 1501. Read at 1511. Normal sinus rhythm at rate of 61 bpm.  Normal HI interval, QRS duration, QTc interval.  No ST segment abnormalities. Normal axis. MDM  The differential diagnosis is seizure, syncope, pseudoseizure, noncompliance, subtherapeutic therapy. EKG reassuring. Patient monitored in the ED for nearly 3 hours and had no seizure-like activity. Patient was given 1 g of Keppra and IV fluids. She is taking the max amount of Vimpat and Briviact. I will add on Keppra 500 mg twice daily which she has not been taking. I will have her follow-up with her neurologist.  I told her if she has another seizure she will need to come to the hospital and be admitted. Disposition   Disposition: Discharged    DISCHARGE PLAN:  1. Current Discharge Medication List        CONTINUE these medications which have NOT CHANGED    Details   !! lacosamide (VIMPAT) 200 mg tab tablet Take 200 mg by mouth two (2) times a day. mirtazapine (REMERON) 15 mg tablet Take 15 mg by mouth nightly. brivaracetam (BRIVIACT) 100 mg tablet Take 100 mg by mouth two (2) times a day. albuterol (PROVENTIL HFA, VENTOLIN HFA, PROAIR HFA) 90 mcg/actuation inhaler Take 2 Puffs by inhalation every four (4) hours as needed for Wheezing, Shortness of Breath or Respiratory Distress. Qty: 1 Inhaler, Refills: 0      ARIPiprazole (ABILIFY) 5 mg tablet Take 1 Tablet by mouth daily.  Indications: additional medications to treat depression  Qty: 30 Tablet, Refills: 0 escitalopram oxalate (LEXAPRO) 20 mg tablet Take 1 Tablet by mouth daily. Indications: major depressive disorder  Qty: 30 Tablet, Refills: 0      !! lacosamide (VIMPAT) 100 mg tab tablet Take 1 Tablet by mouth two (2) times a day. Max Daily Amount: 200 mg. Qty: 60 Tablet, Refills: 0    Associated Diagnoses: Seizure disorder (HCC)      levETIRAcetam 1,000 mg tablet Take 1 Tablet by mouth two (2) times a day. Indications: additional medication for tonic-clonic epilepsy  Qty: 60 Tablet, Refills: 0      traZODone (DESYREL) 50 mg tablet Take 1 Tablet by mouth nightly as needed for Sleep. Indications: insomnia associated with depression  Qty: 30 Tablet, Refills: 0      guaiFENesin ER (MUCINEX) 600 mg ER tablet Take 1 Tablet by mouth two (2) times a day. Qty: 30 Tablet, Refills: 0      venlafaxine-SR (EFFEXOR-XR) 150 mg capsule venlafaxine  mg capsule,extended release 24 hr   TK 1 C PO QD      ondansetron (ZOFRAN ODT) 8 mg disintegrating tablet Take 1 Tab by mouth every eight (8) hours as needed for Nausea. Qty: 30 Tab, Refills: 2      omeprazole (PRILOSEC) 20 mg capsule omeprazole 20 mg capsule,delayed release      prenatal vit-calcium-iron-fa (PRENATAL PLUS with CALCIUM) 27 mg iron- 1 mg tab Take 1 Tab by mouth daily. Indications: PREGNANCY       !! - Potential duplicate medications found. Please discuss with provider. 2.   Follow-up Information       Follow up With Specialties Details Why Contact Info    Your neurology doctor              3.  Return to ED if worse   4. Current Discharge Medication List        START taking these medications    Details   !! levETIRAcetam (Keppra) 500 mg tablet Take 1 Tablet by mouth two (2) times a day for 30 days. Qty: 60 Tablet, Refills: 0  Start date: 11/25/2022, End date: 12/25/2022       !! - Potential duplicate medications found. Please discuss with provider.         CONTINUE these medications which have NOT CHANGED    Details   !! levETIRAcetam 1,000 mg tablet Take 1 Tablet by mouth two (2) times a day. Indications: additional medication for tonic-clonic epilepsy  Qty: 60 Tablet, Refills: 0       !! - Potential duplicate medications found. Please discuss with provider. Diagnosis/Clinical Impression   Clinical Impression:   1. Seizure [facility])           Attestations:  Héctor Gonzalez, DO    Please note that this dictation was completed with [employer], the computer voice recognition software. Quite often unanticipated grammatical, syntax, homophones, and other interpretive errors are inadvertently transcribed by the computer software. Please disregard these errors. Please excuse any errors that have escaped final proofreading. Thank you.

## 2022-11-25 NOTE — ED PROVIDER NOTES
[id number]  EMERGENCY DEPARTMENT ENCOUNTER NOTE    Date: 11/24/2022  Patient Name: Rosana Brown    History of Presenting Illness     Chief Complaint   Patient presents with    Seizure     HPI: Rosana Brown, 32 y.o. female with a past medical history and outpatient medications as listed and reviewed below  presents for seizure-like activity. Patient was reported having seizure-like activity:    - Seizure description: GTC, back to back, multiple, twice earlier today then a \"cluster\" PTP, then one on EMS requiring Versed. Had around 30min postictal phase after the cluster at home, and returned to baseline prior to having another seizure on the ambulance. Currently at baseline, not postictal  - Trauma: Patient fell down with a first seizure and hit her left buttock, complains of localized left buttock pain but is able to ambulate. No other trauma. - Current symptoms: mild L buttock dahiana  - History of seizures: yes  - Seizure medications: Briviact started 1 month ago and Vimpat max dose    Currently the patient does not have any weakness, numbness, focal deficits, significant headache, vomiting, or other complaints. Post-ictal state not currently present. She follows with [facility]. Patient had a breakthrough seizure 2 weeks ago, was seen by teleneurology, they did not recommend changing any of the doses of her medicines.     Medical History   I reviewed the medical, surgical, family, and social history, as well as allergies:    PCP: Marjorie Russell MD    Past Medical History:  Past Medical History:   Diagnosis Date    Anemia 11/13/2019    Asthma     Depression     [facility]     Mood disorder ([facility] [facility]     Seizures ([facility] [address] [facility]     last seizure two weeks ago    Sleep disorder     Suicidal thoughts      Past Surgical History:  Past Surgical History:   Procedure Laterality Date    HX OTHER SURGICAL Left 2008    Left eye cyst removal     Current Outpatient Medications:  Current Outpatient Medications   Medication Instructions    albuterol (PROVENTIL HFA, VENTOLIN HFA, PROAIR HFA) 90 mcg/actuation inhaler 2 Puffs, Inhalation, EVERY 4 HOURS AS NEEDED    ARIPiprazole (ABILIFY) 5 mg, Oral, DAILY    brivaracetam (BRIVIACT) 100 mg, Oral, 2 TIMES DAILY    escitalopram oxalate (LEXAPRO) 20 mg, Oral, DAILY    guaiFENesin ER (MUCINEX) 600 mg, Oral, 2 TIMES DAILY    lacosamide (VIMPAT) 100 mg, Oral, 2 TIMES DAILY    lacosamide (VIMPAT) 200 mg, Oral, 2 TIMES DAILY    levETIRAcetam 1,000 mg, Oral, 2 TIMES DAILY    mirtazapine (REMERON) 15 mg, Oral, EVERY BEDTIME    omeprazole (PRILOSEC) 20 mg capsule omeprazole 20 mg capsule,delayed release    ondansetron (ZOFRAN ODT) 8 mg, Oral, EVERY 8 HOURS AS NEEDED    prenatal vit-calcium-iron-fa (PRENATAL PLUS with CALCIUM) 27 mg iron- 1 mg tab 1 Tablet, DAILY    traZODone (DESYREL) 50 mg, Oral, BEDTIME PRN    venlafaxine-SR (EFFEXOR-XR) 150 mg capsule venlafaxine  mg capsule,extended release 24 hr   TK 1 C PO QD      Family History:  No family history on file. Social History:  Social History     Tobacco Use    Smoking status: Every Day    Smokeless tobacco: Current   Substance Use Topics    Alcohol use: Yes    Drug use: No     Allergies: Allergies   Allergen Reactions    Citrus And Derivatives Hives    Lamictal [Lamotrigine] Nausea and Vomiting       Review of Systems     Negative: Positives and pertinent negatives as per HPI. All other systems were reviewed and are negative. Physical Exam and Vital Signs   Vital Signs - Reviewed the patient's vital signs.     Patient Vitals for the past 12 hrs:   Temp Pulse Resp BP SpO2   11/24/22 2330 -- 74 17 105/78 --   11/24/22 2315 -- 75 22 109/66 --   11/24/22 2300 -- 77 23 113/79 --   11/24/22 2245 -- 74 20 118/76 --   11/24/22 2230 -- 74 24 113/75 --   11/24/22 2107 97.7 °F (36.5 °C) 80 15 119/84 97 %     Physical Exam:    GENERAL: awake, alert, cooperative, not in distress  HEENT:  * Pupils equal, EOMI  * Head atraumatic  CV:  * audible heart sounds  * warm and perfused extremities bilaterally  PULMONARY: Good air movement, no wheezes, no crackles  ABDOMEN/: soft, no distension, no guarding, no abdominal tenderness  EXTREMITIES/BACK: warm and perfused, no tenderness, no edema  BACK  * No obvious trauma to the back, no ecchymosis, scoliosis, lacerations, or abrasions  * No midline back tenderness  * Noted L buttock and mild left lumbar paravertebral back tenderness  * No step-offs or deformities  * Normal power and sensation in bilateral lower extremities  * Normal and equal DP/PT pulses in bilateral lower extremities  * Negative straight leg raise test  * Normal bilateral hip and knee ROM. No hip tenderness. SKIN: no rashes or signs of trauma  NEURO:   * GCS = 15 (E=4, V=5, M=6)  * Awake, alert, oriented x 3, normal speech  * CNs II-XII intact  * Strength 5/5 in all extremities  * Sensory exam grossly normal  * No pronator drift or dysmetria  * NIHSS 0    Medical Decision Making   - I am the first and primary provider for this patient and am the primary provider of record. - I reviewed the vital signs, available nursing notes, past medical history, past surgical history, family history and social history. - Initial assessment performed. The patients presenting problems have been discussed, and the staff are in agreement with the care plan formulated and outlined with them. I have encouraged them to ask questions as they arise throughout their visit. - Available medical records, nursing notes, old EKGs, and EMS run sheets (if patient was EMS transported) were reviewed    MDM:   Patient is a 32 y.o. female presenting for seizures. Vitals reveal no significant abnormalities and physical exam reveals no significant abnormalities.  Based on the history, physical exam, risk factors, and vital signs, I favor the following differential diagnoses: breakthrough seizure, electrolyte abnormalities, drug intoxication. I doubt CVA due to lack of physical exam findings. I doubt ICH as patient has normal return of mentation and no complaints with prior similar seizure episodes. No concern for trauma. See ED Course and Reassessment for discussion and interpretations. Results     Labs:  Recent Results (from the past 12 hour(s))   CBC WITH AUTOMATED DIFF    Collection Time: 11/24/22  9:24 PM   Result Value Ref Range    WBC 7.0 3.6 - 11.0 K/uL    RBC 4.09 3.80 - 5.20 M/uL    HGB 10.6 (L) 11.5 - 16.0 g/dL    HCT 34.2 (L) 35.0 - 47.0 %    MCV 83.6 80.0 - 99.0 FL    MCH 25.9 (L) 26.0 - 34.0 PG    MCHC 31.0 30.0 - 36.5 g/dL    RDW 17.1 (H) 11.5 - 14.5 %    PLATELET 117 367 - 616 K/uL    MPV 11.5 8.9 - 12.9 FL    NRBC 0.0 0.0  WBC    ABSOLUTE NRBC 0.00 0.00 - 0.01 K/uL    NEUTROPHILS 60 32 - 75 %    LYMPHOCYTES 30 12 - 49 %    MONOCYTES 6 5 - 13 %    EOSINOPHILS 3 0 - 7 %    BASOPHILS 1 0 - 1 %    IMMATURE GRANULOCYTES 0 0 - 0.5 %    ABS. NEUTROPHILS 4.2 1.8 - 8.0 K/UL    ABS. LYMPHOCYTES 2.1 0.8 - 3.5 K/UL    ABS. MONOCYTES 0.4 0.0 - 1.0 K/UL    ABS. EOSINOPHILS 0.2 0.0 - 0.4 K/UL    ABS. BASOPHILS 0.1 0.0 - 0.1 K/UL    ABS. IMM. GRANS. 0.0 0.00 - 0.04 K/UL    DF AUTOMATED     METABOLIC PANEL, COMPREHENSIVE    Collection Time: 11/24/22  9:24 PM   Result Value Ref Range    Sodium 130 (L) 136 - 145 mmol/L    Potassium Hemolyzed, recollect requested 3.5 - 5.1 mmol/L    Chloride 103 97 - 108 mmol/L    CO2 27 21 - 32 mmol/L    Anion gap 0 (L) 5 - 15 mmol/L    Glucose 85 65 - 100 mg/dL    BUN 9 6 - 20 mg/dL    Creatinine 0.85 0.55 - 1.02 mg/dL    BUN/Creatinine ratio 11 (L) 12 - 20      eGFR >60 >60 ml/min/1.73m2    Calcium 8.7 8.5 - 10.1 mg/dL    Bilirubin, total 0.2 0.2 - 1.0 mg/dL    AST (SGOT) Hemolyzed, recollect requested 15 - 37 U/L    ALT (SGPT) Hemolyzed, recollect requested 12 - 78 U/L    Alk.  phosphatase 81 45 - 117 U/L    Protein, total 7.2 6.4 - 8.2 g/dL    Albumin 3.1 (L) 3.5 - 5.0 g/dL    Globulin 4.1 (H) 2.0 - 4.0 g/dL    A-G Ratio 0.8 (L) 1.1 - 2.2     HCG QL SERUM    Collection Time: 11/24/22  9:24 PM   Result Value Ref Range    HCG, Ql. Negative Negative     METABOLIC PANEL, COMPREHENSIVE    Collection Time: 11/24/22 11:07 PM   Result Value Ref Range    Sodium 140 136 - 145 mmol/L    Potassium 4.0 3.5 - 5.1 mmol/L    Chloride 107 97 - 108 mmol/L    CO2 28 21 - 32 mmol/L    Anion gap 5 5 - 15 mmol/L    Glucose 92 65 - 100 mg/dL    BUN 8 6 - 20 mg/dL    Creatinine 0.72 0.55 - 1.02 mg/dL    BUN/Creatinine ratio 11 (L) 12 - 20      eGFR >60 >60 ml/min/1.73m2    Calcium 8.8 8.5 - 10.1 mg/dL    Bilirubin, total <0.1 (L) 0.2 - 1.0 mg/dL    AST (SGOT) 10 (L) 15 - 37 U/L    ALT (SGPT) 14 12 - 78 U/L    Alk. phosphatase 73 45 - 117 U/L    Protein, total 5.9 (L) 6.4 - 8.2 g/dL    Albumin 2.8 (L) 3.5 - 5.0 g/dL    Globulin 3.1 2.0 - 4.0 g/dL    A-G Ratio 0.9 (L) 1.1 - 2.2       Radiologic Studies:  CT Results  (Last 48 hours)      None          CXR Results  (Last 48 hours)      None          Medications ordered:  Medications   acetaminophen (TYLENOL) tablet 1,000 mg (1,000 mg Oral Given 11/24/22 2221)   sodium chloride 0.9 % bolus infusion 1,000 mL (1,000 mL IntraVENous New Bag 11/24/22 2221)     ED Course and Reassessment     ED Course:     ED Course as of 11/25/22 0008   u Nov 24, 2022 2228 CBC does not show any evidence of acute process. Leukocytosis not present to suggest infection. Hemoglobin not suggestive of acute anemia. [SS]   4707 Noted Hypokalemia. No other significant electrolyte derangements. Creatinine is not elevated more than baseline range making HERVE unlikely. No significant transaminitis noted. Normal bilirubin. BHCG testing rules out pregnancy. [SS]   7332 Her neurologist at [facility]: Nia Self. Contacted the transfer center to consult neurology. [SS]   8824 Discussed the case with a [facility] neurologist who went through her notes.   He discussed the results of her last EEG with me in some clinic notes. 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 The last time she had the seizures, she was told not to change her meds. Due to the high dose of her medicines and the fact that she follows in [facility], we will not change the dose of her medicines and will tell her to follow-up promptly with her neurologist in the next week and to return if her seizures happen again. This was also the recommendation by [facility] neurology now. 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 [SS]   Fri Nov 25, 2022   0002 No significant electrolyte derangements. Creatinine is not elevated more than baseline range making HERVE unlikely. No significant transaminitis noted. Normal bilirubin. [SS]   0006 No seizures after 3 hours of observation. The patient is asymptomatic. ANO x3, and normal repeat exam with normal vital signs. [SS]      ED Course User Index  [SS] Frederic Verdin MD       Reassessment:    Patient had no recurrent seizure-like activity after period of observation in the emergency department. Repeat physical exam is normal and vital signs are reassuring. Patient is tolerating PO intake. Evaluation did not show any significant abnormalities requiring admission as detailed above. Picture of seizure due to breakthrough seizures and complex epilepsy. Will discharge with follow-up and return precautions. Understanding was insured that at this time there is no evidence for a more malignant underlying process, but that early in the process of an illness, an emergency department workup can be falsely reassuring. Routine discharge counseling was given including the fact that any worsening, changing or persistent symptoms should prompt an immediate call or follow up with their primary physician or the emergency department. The importance of appropriate follow up was also discussed.  More extensive discharge instructions were given in the patient's discharge paperwork. After completion of evaluation and discussion of results and diagnoses, all the questions were answered. If required, all follow up appointments and treatments were discussed and explained. Understanding was insured prior to discharge. Final Disposition     DISCHARGED FROM EMERGENCY DEPARTMENT    Patient will be discharged from the Emergency Department in stable condition. All of the diagnostic tests were reviewed and any questions were answered. Diagnosis, results, follow up if applicable, and return precautions were discussed. I have also put together printed discharge instructions for them that include: 1) educational information regarding their diagnosis, 2) how to care for their diagnosis at home, as well a 3) list of reasons why they would want to return to the ED prior to their follow-up appointment, should their condition change. Any labs or imaging done in the ED will be either printed with the discharge paperwork or available through [address] DISCHARGE PLAN:  1. Current Discharge Medication List        CONTINUE these medications which have NOT CHANGED    Details   !! lacosamide (VIMPAT) 200 mg tab tablet Take 200 mg by mouth two (2) times a day. mirtazapine (REMERON) 15 mg tablet Take 15 mg by mouth nightly. brivaracetam (BRIVIACT) 100 mg tablet Take 100 mg by mouth two (2) times a day. albuterol (PROVENTIL HFA, VENTOLIN HFA, PROAIR HFA) 90 mcg/actuation inhaler Take 2 Puffs by inhalation every four (4) hours as needed for Wheezing, Shortness of Breath or Respiratory Distress. Qty: 1 Inhaler, Refills: 0      ARIPiprazole (ABILIFY) 5 mg tablet Take 1 Tablet by mouth daily. Indications: additional medications to treat depression  Qty: 30 Tablet, Refills: 0      escitalopram oxalate (LEXAPRO) 20 mg tablet Take 1 Tablet by mouth daily.  Indications: major depressive disorder  Qty: 30 Tablet, Refills: 0      !! lacosamide (VIMPAT) 100 mg tab tablet Take 1 Tablet by mouth two (2) times a day. Max Daily Amount: 200 mg. Qty: 60 Tablet, Refills: 0    Associated Diagnoses: Seizure disorder (HCC)      levETIRAcetam 1,000 mg tablet Take 1 Tablet by mouth two (2) times a day. Indications: additional medication for tonic-clonic epilepsy  Qty: 60 Tablet, Refills: 0      traZODone (DESYREL) 50 mg tablet Take 1 Tablet by mouth nightly as needed for Sleep. Indications: insomnia associated with depression  Qty: 30 Tablet, Refills: 0      guaiFENesin ER (MUCINEX) 600 mg ER tablet Take 1 Tablet by mouth two (2) times a day. Qty: 30 Tablet, Refills: 0      venlafaxine-SR (EFFEXOR-XR) 150 mg capsule venlafaxine  mg capsule,extended release 24 hr   TK 1 C PO QD      ondansetron (ZOFRAN ODT) 8 mg disintegrating tablet Take 1 Tab by mouth every eight (8) hours as needed for Nausea. Qty: 30 Tab, Refills: 2      omeprazole (PRILOSEC) 20 mg capsule omeprazole 20 mg capsule,delayed release      prenatal vit-calcium-iron-fa (PRENATAL PLUS with CALCIUM) 27 mg iron- 1 mg tab Take 1 Tab by mouth daily. Indications: PREGNANCY       !! - Potential duplicate medications found. Please discuss with provider. 2.   Follow-up Information       Follow up With Specialties Details Why Contact Info    Dr. Luigi Etienne an appointment as soon as possible for a visit in 3 days      [facility] EMERGENCY DEPT Emergency Medicine Go to  If symptoms worsen [address] [address]  [phone]          3. Return to ED if worse    4. Current Discharge Medication List          Procedures, Critical Care, & Clinical Tools   Performed by: Fabio Frias MD  Procedures       CRITICAL CARE DOCUMENTATION  NOT MET: Critical care billing criteria and/or time were NOT met. Diagnosis     Clinical Impression:   1.  Breakthrough seizure [facility])      Attestations:    Fabio Frias MD    Please note that this dictation was completed with [employer], the computer voice recognition software. Quite often unanticipated grammatical, syntax, homophones, and other interpretive errors are inadvertently transcribed by the computer software. Please disregard these errors. Please excuse any errors that have escaped final proofreading. Thank you.

## 2022-11-25 NOTE — ED TRIAGE NOTES
5 epileptic seizures today. Back pain from fall from last seizure. Family states seizure lasted approx 30 minutes.

## 2022-11-25 NOTE — DISCHARGE INSTRUCTIONS
Thank you! Thank you for allowing me to care for you in the emergency department. I sincerely hope that you are satisfied with your visit today. It is my goal to provide you with excellent care. Below you will find a list of your labs and imaging from your visit today if applicable. Should you have any questions regarding these results please do not hesitate to call the emergency department. Please review [employer] for a more detailed result list since the below list may not be comprehensive. Instructions on how to sign up to [facility] should be provided in this packet. Labs -     Recent Results (from the past 12 hour(s))   CBC WITH AUTOMATED DIFF    Collection Time: 11/24/22  9:24 PM   Result Value Ref Range    WBC 7.0 3.6 - 11.0 K/uL    RBC 4.09 3.80 - 5.20 M/uL    HGB 10.6 (L) 11.5 - 16.0 g/dL    HCT 34.2 (L) 35.0 - 47.0 %    MCV 83.6 80.0 - 99.0 FL    MCH 25.9 (L) 26.0 - 34.0 PG    MCHC 31.0 30.0 - 36.5 g/dL    RDW 17.1 (H) 11.5 - 14.5 %    PLATELET 975 643 - 738 K/uL    MPV 11.5 8.9 - 12.9 FL    NRBC 0.0 0.0  WBC    ABSOLUTE NRBC 0.00 0.00 - 0.01 K/uL    NEUTROPHILS 60 32 - 75 %    LYMPHOCYTES 30 12 - 49 %    MONOCYTES 6 5 - 13 %    EOSINOPHILS 3 0 - 7 %    BASOPHILS 1 0 - 1 %    IMMATURE GRANULOCYTES 0 0 - 0.5 %    ABS. NEUTROPHILS 4.2 1.8 - 8.0 K/UL    ABS. LYMPHOCYTES 2.1 0.8 - 3.5 K/UL    ABS. MONOCYTES 0.4 0.0 - 1.0 K/UL    ABS. EOSINOPHILS 0.2 0.0 - 0.4 K/UL    ABS. BASOPHILS 0.1 0.0 - 0.1 K/UL    ABS. IMM.  GRANS. 0.0 0.00 - 0.04 K/UL    DF AUTOMATED     METABOLIC PANEL, COMPREHENSIVE    Collection Time: 11/24/22  9:24 PM   Result Value Ref Range    Sodium 130 (L) 136 - 145 mmol/L    Potassium Hemolyzed, recollect requested 3.5 - 5.1 mmol/L    Chloride 103 97 - 108 mmol/L    CO2 27 21 - 32 mmol/L    Anion gap 0 (L) 5 - 15 mmol/L    Glucose 85 65 - 100 mg/dL    BUN 9 6 - 20 mg/dL    Creatinine 0.85 0.55 - 1.02 mg/dL    BUN/Creatinine ratio 11 (L) 12 - 20      eGFR >60 >60 ml/min/1.73m2 Calcium 8.7 8.5 - 10.1 mg/dL    Bilirubin, total 0.2 0.2 - 1.0 mg/dL    AST (SGOT) Hemolyzed, recollect requested 15 - 37 U/L    ALT (SGPT) Hemolyzed, recollect requested 12 - 78 U/L    Alk. phosphatase 81 45 - 117 U/L    Protein, total 7.2 6.4 - 8.2 g/dL    Albumin 3.1 (L) 3.5 - 5.0 g/dL    Globulin 4.1 (H) 2.0 - 4.0 g/dL    A-G Ratio 0.8 (L) 1.1 - 2.2     HCG QL SERUM    Collection Time: 11/24/22  9:24 PM   Result Value Ref Range    HCG, Ql. Negative Negative     METABOLIC PANEL, COMPREHENSIVE    Collection Time: 11/24/22 11:07 PM   Result Value Ref Range    Sodium 140 136 - 145 mmol/L    Potassium 4.0 3.5 - 5.1 mmol/L    Chloride 107 97 - 108 mmol/L    CO2 28 21 - 32 mmol/L    Anion gap 5 5 - 15 mmol/L    Glucose 92 65 - 100 mg/dL    BUN 8 6 - 20 mg/dL    Creatinine 0.72 0.55 - 1.02 mg/dL    BUN/Creatinine ratio 11 (L) 12 - 20      eGFR >60 >60 ml/min/1.73m2    Calcium 8.8 8.5 - 10.1 mg/dL    Bilirubin, total <0.1 (L) 0.2 - 1.0 mg/dL    AST (SGOT) 10 (L) 15 - 37 U/L    ALT (SGPT) 14 12 - 78 U/L    Alk. phosphatase 73 45 - 117 U/L    Protein, total 5.9 (L) 6.4 - 8.2 g/dL    Albumin 2.8 (L) 3.5 - 5.0 g/dL    Globulin 3.1 2.0 - 4.0 g/dL    A-G Ratio 0.9 (L) 1.1 - 2.2         Radiologic Studies -   No orders to display     CT Results  (Last 48 hours)      None          CXR Results  (Last 48 hours)      None               If you feel that you have not received excellent quality care or timely care, please ask to speak to the nurse manager. Please choose us in the future for your continued health care needs. ------------------------------------------------------------------------------------------------------------  The exam and treatment you received in the Emergency Department were for an urgent problem and are not intended as complete care.  It is important that you follow-up with a doctor, nurse practitioner, or physician assistant to:  (1) confirm your diagnosis,  (2) re-evaluation of changes in your illness and treatment, and  (3) for ongoing care. If your symptoms become worse or you do not improve as expected and you are unable to reach your usual health care provider, you should return to the Emergency Department. We are available 24 hours a day. Please take your discharge instructions with you when you go to your follow-up appointment. If a prescription has been provided, please have it filled as soon as possible to prevent a delay in treatment. Read the entire medication instruction sheet provided to you by the pharmacy. If you have any questions or reservations about taking the medication due to side effects or interactions with other medications, please call your primary care physician or contact the ER to speak with the charge nurse. Make an appointment with your family doctor or the physician you were referred to for follow-up of this visit as instructed on your discharge paperwork, as this is a mandatory follow-up. Return to the ER if you are unable to be seen or if you are unable to be seen in a timely manner. If you have any problem arranging the follow-up visit, contact the Emergency Department immediately.

## 2022-11-25 NOTE — ED TRIAGE NOTES
Patient here after having \" 9 witnessed seizures per family\" this am. EMS gave Versed 2.5 mg for 2 min grand mal seizure. Family attempted to give patient Lorazepam nasal drops for seizure prior to calling EMS. Here last night for same and called EMS yesterday for seizures but patient refused transport.

## 2022-11-26 ENCOUNTER — APPOINTMENT (OUTPATIENT)
Dept: CT IMAGING | Age: 27
End: 2022-11-26
Attending: EMERGENCY MEDICINE
Payer: MEDICAID

## 2022-11-26 ENCOUNTER — HOSPITAL ENCOUNTER (EMERGENCY)
Age: 27
Discharge: HOME | End: 2022-11-26
Attending: EMERGENCY MEDICINE
Payer: MEDICAID

## 2022-11-26 VITALS
HEIGHT: 64 IN | TEMPERATURE: 98.3 F | DIASTOLIC BLOOD PRESSURE: 80 MMHG | OXYGEN SATURATION: 100 % | HEART RATE: 73 BPM | RESPIRATION RATE: 20 BRPM | SYSTOLIC BLOOD PRESSURE: 122 MMHG | WEIGHT: 197 LBS | BODY MASS INDEX: 33.63 KG/M2

## 2022-11-26 DIAGNOSIS — R56.9 SEIZURE (HCC): Primary | ICD-10-CM

## 2022-11-26 LAB
ALBUMIN SERPL-MCNC: 3.1 G/DL (ref 3.5–5)
ALBUMIN/GLOB SERPL: 0.8 {RATIO} (ref 1.1–2.2)
ALP SERPL-CCNC: 69 U/L (ref 45–117)
ALT SERPL-CCNC: 16 U/L (ref 12–78)
ANION GAP SERPL CALC-SCNC: 3 MMOL/L (ref 5–15)
AST SERPL W P-5'-P-CCNC: 10 U/L (ref 15–37)
ATRIAL RATE: 61 BPM
BASOPHILS # BLD: 0.1 K/UL (ref 0–0.1)
BASOPHILS NFR BLD: 1 % (ref 0–1)
BILIRUB SERPL-MCNC: 0.2 MG/DL (ref 0.2–1)
BUN SERPL-MCNC: 4 MG/DL (ref 6–20)
BUN/CREAT SERPL: 5 (ref 12–20)
CA-I BLD-MCNC: 9 MG/DL (ref 8.5–10.1)
CALCULATED P AXIS, ECG09: 41 DEGREES
CALCULATED R AXIS, ECG10: 43 DEGREES
CALCULATED T AXIS, ECG11: 40 DEGREES
CHLORIDE SERPL-SCNC: 106 MMOL/L (ref 97–108)
CO2 SERPL-SCNC: 29 MMOL/L (ref 21–32)
CREAT SERPL-MCNC: 0.73 MG/DL (ref 0.55–1.02)
DIAGNOSIS, 93000: NORMAL
DIFFERENTIAL METHOD BLD: ABNORMAL
EOSINOPHIL # BLD: 0.3 K/UL (ref 0–0.4)
EOSINOPHIL NFR BLD: 5 % (ref 0–7)
ERYTHROCYTE [DISTWIDTH] IN BLOOD BY AUTOMATED COUNT: 16.2 % (ref 11.5–14.5)
GLOBULIN SER CALC-MCNC: 3.9 G/DL (ref 2–4)
GLUCOSE SERPL-MCNC: 98 MG/DL (ref 65–100)
HCT VFR BLD AUTO: 34.4 % (ref 35–47)
HGB BLD-MCNC: 10.8 G/DL (ref 11.5–16)
IMM GRANULOCYTES # BLD AUTO: 0 K/UL (ref 0–0.04)
IMM GRANULOCYTES NFR BLD AUTO: 0 % (ref 0–0.5)
LYMPHOCYTES # BLD: 1.8 K/UL (ref 0.8–3.5)
LYMPHOCYTES NFR BLD: 34 % (ref 12–49)
MCH RBC QN AUTO: 25.7 PG (ref 26–34)
MCHC RBC AUTO-ENTMCNC: 31.4 G/DL (ref 30–36.5)
MCV RBC AUTO: 81.7 FL (ref 80–99)
MONOCYTES # BLD: 0.3 K/UL (ref 0–1)
MONOCYTES NFR BLD: 6 % (ref 5–13)
NEUTS SEG # BLD: 2.9 K/UL (ref 1.8–8)
NEUTS SEG NFR BLD: 54 % (ref 32–75)
NRBC # BLD: 0 K/UL (ref 0–0.01)
NRBC BLD-RTO: 0 PER 100 WBC
P-R INTERVAL, ECG05: 164 MS
PLATELET # BLD AUTO: 351 K/UL (ref 150–400)
PMV BLD AUTO: 10.7 FL (ref 8.9–12.9)
POTASSIUM SERPL-SCNC: 3.9 MMOL/L (ref 3.5–5.1)
PROT SERPL-MCNC: 7 G/DL (ref 6.4–8.2)
Q-T INTERVAL, ECG07: 400 MS
QRS DURATION, ECG06: 76 MS
QTC CALCULATION (BEZET), ECG08: 402 MS
RBC # BLD AUTO: 4.21 M/UL (ref 3.8–5.2)
SODIUM SERPL-SCNC: 138 MMOL/L (ref 136–145)
VENTRICULAR RATE, ECG03: 61 BPM
WBC # BLD AUTO: 5.3 K/UL (ref 3.6–11)

## 2022-11-26 PROCEDURE — 99284 EMERGENCY DEPT VISIT MOD MDM: CPT

## 2022-11-26 PROCEDURE — 96374 THER/PROPH/DIAG INJ IV PUSH: CPT

## 2022-11-26 PROCEDURE — 70450 CT HEAD/BRAIN W/O DYE: CPT

## 2022-11-26 PROCEDURE — 80177 DRUG SCRN QUAN LEVETIRACETAM: CPT

## 2022-11-26 PROCEDURE — 80053 COMPREHEN METABOLIC PANEL: CPT

## 2022-11-26 PROCEDURE — 74011250636 HC RX REV CODE- 250/636: Performed by: EMERGENCY MEDICINE

## 2022-11-26 PROCEDURE — 36415 COLL VENOUS BLD VENIPUNCTURE: CPT

## 2022-11-26 PROCEDURE — 85025 COMPLETE CBC W/AUTO DIFF WBC: CPT

## 2022-11-26 RX ORDER — LEVETIRACETAM 500 MG/5ML
1000 INJECTION, SOLUTION, CONCENTRATE INTRAVENOUS EVERY 12 HOURS
Status: DISCONTINUED | OUTPATIENT
Start: 2022-11-26 | End: 2022-11-26 | Stop reason: HOSPADM

## 2022-11-26 RX ADMIN — LEVETIRACETAM 1000 MG: 100 INJECTION INTRAVENOUS at 12:35

## 2022-11-26 NOTE — DISCHARGE INSTRUCTIONS
Thank you! Thank you for allowing me to care for you in the emergency department. It is my goal to provide you with excellent care. If you have not received excellent quality care, please ask to speak to the nurse manager. Please fill out the survey that will come to you by mail or email since we listen to your feedback! Below you will find a list of your tests from today's visit. Should you have any questions, please do not hesitate to call the emergency department. Labs  Recent Results (from the past 12 hour(s))   CBC WITH AUTOMATED DIFF    Collection Time: 11/26/22 11:49 AM   Result Value Ref Range    WBC 5.3 3.6 - 11.0 K/uL    RBC 4.21 3.80 - 5.20 M/uL    HGB 10.8 (L) 11.5 - 16.0 g/dL    HCT 34.4 (L) 35.0 - 47.0 %    MCV 81.7 80.0 - 99.0 FL    MCH 25.7 (L) 26.0 - 34.0 PG    MCHC 31.4 30.0 - 36.5 g/dL    RDW 16.2 (H) 11.5 - 14.5 %    PLATELET 941 479 - 933 K/uL    MPV 10.7 8.9 - 12.9 FL    NRBC 0.0 0.0  WBC    ABSOLUTE NRBC 0.00 0.00 - 0.01 K/uL    NEUTROPHILS 54 32 - 75 %    LYMPHOCYTES 34 12 - 49 %    MONOCYTES 6 5 - 13 %    EOSINOPHILS 5 0 - 7 %    BASOPHILS 1 0 - 1 %    IMMATURE GRANULOCYTES 0 0 - 0.5 %    ABS. NEUTROPHILS 2.9 1.8 - 8.0 K/UL    ABS. LYMPHOCYTES 1.8 0.8 - 3.5 K/UL    ABS. MONOCYTES 0.3 0.0 - 1.0 K/UL    ABS. EOSINOPHILS 0.3 0.0 - 0.4 K/UL    ABS. BASOPHILS 0.1 0.0 - 0.1 K/UL    ABS. IMM. GRANS. 0.0 0.00 - 0.04 K/UL    DF AUTOMATED     METABOLIC PANEL, COMPREHENSIVE    Collection Time: 11/26/22 11:49 AM   Result Value Ref Range    Sodium 138 136 - 145 mmol/L    Potassium 3.9 3.5 - 5.1 mmol/L    Chloride 106 97 - 108 mmol/L    CO2 29 21 - 32 mmol/L    Anion gap 3 (L) 5 - 15 mmol/L    Glucose 98 65 - 100 mg/dL    BUN 4 (L) 6 - 20 mg/dL    Creatinine 0.73 0.55 - 1.02 mg/dL    BUN/Creatinine ratio 5 (L) 12 - 20      eGFR >60 >60 ml/min/1.73m2    Calcium 9.0 8.5 - 10.1 mg/dL    Bilirubin, total 0.2 0.2 - 1.0 mg/dL    AST (SGOT) 10 (L) 15 - 37 U/L    ALT (SGPT) 16 12 - 78 U/L    Alk. phosphatase 69 45 - 117 U/L    Protein, total 7.0 6.4 - 8.2 g/dL    Albumin 3.1 (L) 3.5 - 5.0 g/dL    Globulin 3.9 2.0 - 4.0 g/dL    A-G Ratio 0.8 (L) 1.1 - 2.2         Radiologic Studies  CT HEAD WO CONT   Final Result   1. No evidence of acute intracranial abnormality. CT Results  (Last 48 hours)                 11/26/22 1215  CT HEAD WO CONT Final result    Impression:  1. No evidence of acute intracranial abnormality. Narrative:  EXAM:  CT HEAD WO CONT       INDICATION:   seizure       COMPARISON: CT head 11/13/2022. TECHNIQUE: Unenhanced CT of the head was performed using 5 mm images. Brain and   bone windows were generated. CT dose reduction was achieved through use of a   standardized protocol tailored for this examination and automatic exposure   control for dose modulation. FINDINGS:   The ventricles are normal in size and position. Basilar cisterns are patent. No   midline shift. There is no evidence of acute infarct, hemorrhage, or extraaxial   fluid collection. Mild mucosal thickening in the right maxillary sinus. The mastoid air cells and   middle ears are clear. The orbital contents are within normal limits. There are   no significant osseous or extracranial soft tissue lesions. CXR Results  (Last 48 hours)      None          ------------------------------------------------------------------------------------------------------------  The exam and treatment you received in the Emergency Department were for an urgent problem and are not intended as complete care. It is important that you follow-up with a doctor, nurse practitioner, or physician assistant to:  (1) confirm your diagnosis,  (2) re-evaluation of changes in your illness and treatment, and  (3) for ongoing care. Please take your discharge instructions with you when you go to your follow-up appointment.      If you have any problem arranging a follow-up appointment, contact the Emergency Department. If your symptoms become worse or you do not improve as expected and you are unable to reach your health care provider, please return to the Emergency Department. We are available 24 hours a day. If a prescription has been provided, please have it filled as soon as possible to prevent a delay in treatment. If you have any questions or reservations about taking the medication due to side effects or interactions with other medications, please call your primary care provider or contact the ER.

## 2022-11-26 NOTE — Clinical Note
[facility] EMERGENCY DEPT  [address] [place] [id number]  [phone]    Work/School Note    Date: 11/26/2022    To Whom It May concern:      Jono Cortés was seen and treated today in the emergency room by the following provider(s):  Attending Provider: Cecile Chacko MD.      Jono Cortés is excused from work/school on 11/26/22. She is clear to return to work/school on 11/27/22.         Sincerely,          Tahira Steve MD

## 2022-11-26 NOTE — ED TRIAGE NOTES
Started with seizure like activity this am. Two witness by first ems, lasted 20 seconds. One witnessed by second ems. Per patient she is compliant with medications. Currenlty alert and oriented, complaining of headache. Seizure medication. Vimpat, keppra.

## 2022-11-27 NOTE — ED PROVIDER NOTES
EMERGENCY DEPARTMENT HISTORY AND PHYSICAL EXAM      Date: 11/26/2022  Patient Name: Paul Todd    History of Presenting Illness     Chief Complaint   Patient presents with    Seizure       History Provided By: Patient    HPI: Paul Todd, 32 y.o. female with a past medical history significant seizure presents to the ED with chief complaint of Seizure  . 20-year-old female with a history of seizures. On Keppra and Vimpat. Unclear if she has missed any doses. Patient presents after seizure today. No headache. No nausea or vomiting. No trauma. Feeling much better after arrival to the ER. There are no other complaints, changes, or physical findings at this time. PCP: Nadeem Enamorado MD    Current Outpatient Medications   Medication Sig Dispense Refill    levETIRAcetam (Keppra) 500 mg tablet Take 1 Tablet by mouth two (2) times a day for 30 days. 60 Tablet 0    lacosamide (VIMPAT) 200 mg tab tablet Take 200 mg by mouth two (2) times a day.  mirtazapine (REMERON) 15 mg tablet Take 15 mg by mouth nightly.  brivaracetam (BRIVIACT) 100 mg tablet Take 100 mg by mouth two (2) times a day.  albuterol (PROVENTIL HFA, VENTOLIN HFA, PROAIR HFA) 90 mcg/actuation inhaler Take 2 Puffs by inhalation every four (4) hours as needed for Wheezing, Shortness of Breath or Respiratory Distress. (Patient not taking: Reported on 11/13/2022) 1 Inhaler 0    ARIPiprazole (ABILIFY) 5 mg tablet Take 1 Tablet by mouth daily. Indications: additional medications to treat depression 30 Tablet 0    escitalopram oxalate (LEXAPRO) 20 mg tablet Take 1 Tablet by mouth daily. Indications: major depressive disorder 30 Tablet 0    lacosamide (VIMPAT) 100 mg tab tablet Take 1 Tablet by mouth two (2) times a day. Max Daily Amount: 200 mg. 60 Tablet 0    levETIRAcetam 1,000 mg tablet Take 1 Tablet by mouth two (2) times a day.  Indications: additional medication for tonic-clonic epilepsy (Patient not taking: No sig reported) 60 Tablet 0    traZODone (DESYREL) 50 mg tablet Take 1 Tablet by mouth nightly as needed for Sleep. Indications: insomnia associated with depression (Patient not taking: No sig reported) 30 Tablet 0    guaiFENesin ER (MUCINEX) 600 mg ER tablet Take 1 Tablet by mouth two (2) times a day. (Patient not taking: No sig reported) 30 Tablet 0    venlafaxine-SR (EFFEXOR-XR) 150 mg capsule venlafaxine  mg capsule,extended release 24 hr   TK 1 C PO QD (Patient not taking: No sig reported)      ondansetron (ZOFRAN ODT) 8 mg disintegrating tablet Take 1 Tab by mouth every eight (8) hours as needed for Nausea. (Patient not taking: No sig reported) 30 Tab 2    omeprazole (PRILOSEC) 20 mg capsule omeprazole 20 mg capsule,delayed release (Patient not taking: No sig reported)      prenatal vit-calcium-iron-fa (PRENATAL PLUS with CALCIUM) 27 mg iron- 1 mg tab Take 1 Tab by mouth daily. Indications: PREGNANCY (Patient not taking: No sig reported)         Past History     Past Medical History:  Past Medical History:   Diagnosis Date    Anemia 11/13/2019    Asthma     Depression     Epilepsy ([facility] [address]     Mood disorder (HCC)     Seizures (HCC)     last seizure two weeks ago    Sleep disorder     Suicidal thoughts        Past Surgical History:  Past Surgical History:   Procedure Laterality Date    HX OTHER SURGICAL Left 2008    Left eye cyst removal       Family History:  History reviewed. No pertinent family history. Social History:  Social History     Tobacco Use    Smoking status: Every Day    Smokeless tobacco: Current   Substance Use Topics    Alcohol use: Yes    Drug use: No       Allergies: Allergies   Allergen Reactions    Citrus And Derivatives Hives    Lamictal [Lamotrigine] Nausea and Vomiting         Review of Systems   Review of Systems   Constitutional: Negative. Negative for chills, fatigue and fever. HENT: Negative.   Negative for congestion, nosebleeds and sore throat. Eyes: Negative. Negative for pain, discharge and visual disturbance. Respiratory: Negative. Negative for cough, chest tightness and shortness of breath. Cardiovascular:  Negative for chest pain, palpitations and leg swelling. Gastrointestinal:  Negative for abdominal pain, blood in stool, constipation, diarrhea, nausea and vomiting. Endocrine: Negative. Genitourinary: Negative. Negative for difficulty urinating, dysuria, pelvic pain and vaginal bleeding. Musculoskeletal: Negative. Negative for arthralgias, back pain and myalgias. Skin: Negative. Negative for rash and wound. Allergic/Immunologic: Negative. Neurological:  Positive for seizures. Negative for dizziness, syncope, weakness, numbness and headaches. Hematological: Negative. Psychiatric/Behavioral: Negative. Negative for agitation, confusion and suicidal ideas. All other systems reviewed and are negative. Physical Exam   No data found. Physical Exam  Vitals and nursing note reviewed. Exam conducted with a chaperone present. Constitutional:       Appearance: Normal appearance. She is normal weight. HENT:      Head: Normocephalic and atraumatic. Nose: Nose normal.      Mouth/Throat:      Mouth: Mucous membranes are moist.      Pharynx: Oropharynx is clear. Eyes:      Extraocular Movements: Extraocular movements intact. Conjunctiva/sclera: Conjunctivae normal.      Pupils: Pupils are equal, round, and reactive to light. Cardiovascular:      Rate and Rhythm: Normal rate and regular rhythm. Pulses: Normal pulses. Heart sounds: Normal heart sounds. Pulmonary:      Effort: Pulmonary effort is normal. No respiratory distress. Breath sounds: Normal breath sounds. Abdominal:      General: Abdomen is flat. Bowel sounds are normal. There is no distension. Palpations: Abdomen is soft. Tenderness: There is no abdominal tenderness. There is no guarding.    Musculoskeletal: General: No swelling, tenderness, deformity or signs of injury. Normal range of motion. Cervical back: Normal range of motion and neck supple. Right lower leg: No edema. Left lower leg: No edema. Skin:     General: Skin is warm and dry. Capillary Refill: Capillary refill takes less than 2 seconds. Findings: No lesion or rash. Neurological:      General: No focal deficit present. Mental Status: She is alert and oriented to person, place, and time. Mental status is at baseline. Cranial Nerves: No cranial nerve deficit. Psychiatric:         Mood and Affect: Mood normal.         Behavior: Behavior normal.         Thought Content: Thought content normal.         Judgment: Judgment normal.       Diagnostic Study Results     Labs -No results found for this or any previous visit (from the past 24 hour(s)). 11/26/22 1255  LEVETIRACETAM (KEPPRA)   Collected: 11/26/22 1239  In process  Specimen: Blood from Serum            96/33/73 [id number]  METABOLIC PANEL, COMPREHENSIVE   Collected: 11/26/22 1149  Final result  Specimen: Serum or Plasma     Sodium 138 mmol/L   Potassium 3.9 mmol/L   Chloride 106 mmol/L   CO2 29 mmol/L   Anion gap 3 Low  mmol/L   Glucose 98 mg/dL   BUN 4 Low  mg/dL   Creatinine 0.73 mg/dL   BUN/Creatinine ratio 5 Low      eGFR >60 ml/min/1.73m2    Calcium 9.0 mg/dL   Bilirubin, total 0.2 mg/dL   AST (SGOT) 10 Low  U/L   ALT (SGPT) 16 U/L   Alk.  phosphatase 69 U/L   Protein, total 7.0 g/dL   Albumin 3.1 Low  g/dL   Globulin 3.9 g/dL   A-G Ratio 0.8 Low             11/26/22 1204  CBC WITH AUTOMATED DIFF   Collected: 11/26/22 1149  Final result  Specimen: Whole Blood     WBC 5.3 K/uL   RBC 4.21 M/uL   HGB 10.8 Low  g/dL   HCT 34.4 Low  %   MCV 81.7 FL   MCH 25.7 Low  PG   MCHC 31.4 g/dL   RDW 16.2 High  %   PLATELET 743 K/uL   MPV 10.7 FL   NRBC 0.0  WBC   ABSOLUTE NRBC 0.00 K/uL   NEUTROPHILS 54 %   LYMPHOCYTES 34 %   MONOCYTES 6 %   EOSINOPHILS 5 % BASOPHILS 1 %   IMMATURE GRANULOCYTES 0 %   ABS. NEUTROPHILS 2.9 K/UL   ABS. LYMPHOCYTES 1.8 K/UL   ABS. MONOCYTES 0.3 K/UL   ABS. EOSINOPHILS 0.3 K/UL   ABS. BASOPHILS 0.1 K/UL   ABS. IMM. GRANS. 0.0 K/UL   DF AUTOMATED              Radiologic Studies -   CT HEAD WO CONT   Final Result   1. No evidence of acute intracranial abnormality. CT Results  (Last 48 hours)                 11/26/22 1215  CT HEAD WO CONT Final result    Impression:  1. No evidence of acute intracranial abnormality. Narrative:  EXAM:  CT HEAD WO CONT       INDICATION:   seizure       COMPARISON: CT head 11/13/2022. TECHNIQUE: Unenhanced CT of the head was performed using 5 mm images. Brain and   bone windows were generated. CT dose reduction was achieved through use of a   standardized protocol tailored for this examination and automatic exposure   control for dose modulation. FINDINGS:   The ventricles are normal in size and position. Basilar cisterns are patent. No   midline shift. There is no evidence of acute infarct, hemorrhage, or extraaxial   fluid collection. Mild mucosal thickening in the right maxillary sinus. The mastoid air cells and   middle ears are clear. The orbital contents are within normal limits. There are   no significant osseous or extracranial soft tissue lesions. CXR Results  (Last 48 hours)      None            Medical Decision Making and ED Course   I am the first provider for this patient. I reviewed the vital signs, available nursing notes, past medical history, past surgical history, family history and social history. Vital Signs-Reviewed the patient's vital signs. No data found. EKG interpretation:         Records Reviewed: Previous Hospital chart. EMS run report      ED Course:   Initial assessment performed. The patients presenting problems have been discussed, and they are in agreement with the care plan formulated and outlined with them.   I have encouraged them to ask questions as they arise throughout their visit. Orders Placed This Encounter    CT HEAD WO CONT     Standing Status:   Standing     Number of Occurrences:   1     Order Specific Question:   Transport     Answer:   BED [2]     Order Specific Question:   Is Patient Pregnant? Answer:   No     Order Specific Question:   Reason for Exam     Answer:   seizure     Order Specific Question:   Decision Support Exception     Answer:   Emergency Medical Condition (MA) [1]    CBC WITH AUTOMATED DIFF     Standing Status:   Standing     Number of Occurrences:   1    METABOLIC PANEL, COMPREHENSIVE     Standing Status:   Standing     Number of Occurrences:   1    LEVETIRACETAM (KEPPRA)     Standing Status:   Standing     Number of Occurrences:   1    DISCONTD: levETIRAcetam (KEPPRA) injection 1,000 mg                 Provider Notes (Medical Decision Making):   59-year-old female with a history of seizures. Patient has seizure today. We will give 1 dose of Keppra sent Keppra levels for her neurologist.  Patient stable for discharge. Consults              Discharged    Procedures               Disposition       Emergency Department Disposition:  Discharged      Diagnosis     Clinical Impression:   1. Seizure [facility])        Attestations:    Douglas Villanueva MD    Please note that this dictation was completed with Typekit, the computer voice recognition software. Quite often unanticipated grammatical, syntax, homophones, and other interpretive errors are inadvertently transcribed by the computer software. Please disregard these errors. Please excuse any errors that have escaped final proofreading. Thank you.

## 2022-11-28 LAB — LEVETIRACETAM SERPL-MCNC: 85.4 UG/ML (ref 10–40)

## 2022-11-28 NOTE — H&P
[facility] HISTORY AND PHYSICAL    Name:  Narcisa Stein  MR#:  [id number]  :  1995  ACCOUNT #:  [de-identified]  ADMIT DATE:  2022    HISTORY OF PRESENT ILLNESS:  This is a 80-year-old single  female patient readmitted to behavioral health unit back from ED after medical clearance for syncopal attack. This patient was originally admitted to behavioral health unit on 2022 for overdose with Vimpat, depression, and seizure disorder. On 2022, around 4 p.m., she was found on the floor in the prone position with left side of the nose and chin looking red. A C-collar was put. Rapid response was called and the internist recommended transfer and discharged to the ED for further care. She was discharged on 2022. After medical clearance from the ED, she was returned back as a new admission on 2022 late night. Please make reference to my initial psychiatric admission H and P dated 2022 and also discharge summary that was done. Essentially, this patient readmitted back from ED for ongoing care for depression. Basically, she was originally admitted for overdose with pill fragments and writing a note to four different people about bye, bye. When she returned on 2022 late in the evening, she was alert, oriented, cooperative, pleasant, good eye contact, speech was within normal, thoughts were logical.    PAST MEDICAL HISTORY:  Past history of asthma, seizure disorder, mood disorder, anemia, history of antepartum hemorrhage, gastritis, past history of drug abuse, depression. ALLERGIES:  TO CITRUS AND LAMICTAL. SUBSTANCE ABUSE HISTORY:  Vapor equivalent to half a pack of cigarettes a day. On 2022, she was alert, verbal, oriented, not suicidal, not homicidal.  From the get-go, she was saying how stupid it was on her . Not agitated, not aggressive, not psychotic.   Apparently, she talked to her mother, Miananamikaleise Pool, wants the patient home and her kids were missing her. The patient said that she was not suicidal.  The patient lives with her mother, verbally contracted for safety for the next 72 hours, and at this point, we felt we could not get a TDO on her. She had an opportunity to be in the hospice on 11/11/2022 and remorseful from the get-go. She states children important, mother for both support and safety, not a case for a TDO. She was let go against medical advice along with her mother, to get ongoing help. At the time of discharge, the patient is not depressed, not suicidal.  Remorseful for suicidal attempt, states that she is agreeing for safety contract, followup. Mother providing supervision and discharged. She has all the medications at home. No new labs were done other than on 11/13/2022. Pregnancy test was negative. COVID-19 and Influenza A and B not detected. Urinalysis, protein 30, blood large, wbc's 5 to 10, rbc's 50 to 100. No cultures are indicated. Blood screen positive for benzodiazepines, positive for THC. Salicylate level 1.7. Magnesium level 2.1. Alcohol level 10.         Romayne Pester, MD      [id number]  D:  11/27/2022 19:37  T:  11/28/2022 5:24  JOB #:  [id number]

## 2022-11-28 NOTE — DISCHARGE SUMMARY
[facility] SUMMARY    Name:  Shruti Carreno  MR#:  [id number]  :  1995  ACCOUNT #:  [de-identified]  ADMIT DATE:  2022  DISCHARGE DATE:  2022    Please make reference to my initial psychiatric H and P for this admission done on 2022. HISTORY OF PRESENT ILLNESS:  This is a 24-year-old single [facility] female patient who was adm, overdosed with Vimpat pills. She lives with her mother and has a seizure disorder, asthma, anemia. Problem with her boyfriend. Felt mother is sarcastic, and there are some financial difficulties. She had a seizure, which was supposed to be last two weeks ago prior to admission. Apparently, she took an overdose of pills, vomited, and there were pill fragments and also reportedly wrote four notes for four different people. She claimed this was from the previous year and the pills were from previous, but the pills were filled on 2022. Later, she admitted it was a stupid mistake. She was admitted and started on medications and participated in individual therapy, group therapy. She was seen in the morning. She was doing okay except complaining of her roommate snoring. However, late in the afternoon on 2022, I believe, around 4 o'clock, the staff found her on the floor prone with some redness of the nose on the left side and the chin. A code was called, and C-collar was applied and internist on code recommended transfer to the ED, and she was transferred. At the ED, they thought it was syncope. The blood pressures on the behavioral health unit before transfer were fairly good, and seizure was not witnessed. Later in the day, around 08:00 p.m., she was transferred back, but technically, the patient was discharged from the behavioral health unit when she was transferred to the ED for further assessment and care.     DISCHARGE DIAGNOSES:  Major depression, recurrent, with overdose with pills; history of seizure disorder; anemia; bronchial asthma, asymptomatic; syncopal attack. Since she was just transferred to the ED, no medications were printed and then case was readmitted to the behavioral health unit after medical clearance the same day.         Jhoana Rosales MD      [id number]  D:  11/27/2022 19:22  T:  11/28/2022 0:34  JOB #:  [id number]

## 2022-11-28 NOTE — DISCHARGE SUMMARY
[facility] SUMMARY    Name:  Zach Sherman  MR#:  [id number]  :  1995  ACCOUNT #:  [de-identified]  ADMIT DATE:  2022  DISCHARGE DATE:  2022      HISTORY OF PRESENT ILLNESS:  This is a 63-year-old single  female patient who was readmitted to behavioral health unit ED for ongoing continuum care. This patient was originally admitted to behavioral health unit on 2022 for depression with overdose with pills with a suicidal note, and she also has seizures, asthma, anemia. However, on 2022 in the evening, the patient was found on the floor in prone position with redness of the left side of the nose and the chin. A rapid response was called, and the patient was moved to ED as a discharge for further care. After assessing the patient, the ED physician felt that it was a syncopal attack. Medically cleared, readmitted to behavioral health unit for ongoing care on the late evening of 2022. The patient was seen on 2022, and she was alert, awake, free or suicidal thoughts. She talked to her mother. They both wished to be discharged against medical advice, and she agreed for no-harm contract for 72 hours. Go for outpatient. From the get-go, the patient was remorseful having made a suicidal attempt stating it was a stupid decision. She loves and cares about her two children, 6year-old and 10year-old. Lives with her mother. Mother provides support, supervision. Wished she be discharged against medical advice. At this point, she is felt to be not a case for a TDO. She has been in the hospital since 2022, and this is 2022. A lot has happened an, learn coping skills and inappropriateness of suicidal attempt. She was discharged. DISCHARGE DIAGNOSES:  Major depression, recurrent, acute, severe, with overdose with pills. Not suicidal at this time of discharge. Learn coping skills, has mom's support.   Has anemia, asthma, seizure disorder, syncope. She has all the medications. No new medications given because of the nature o, against medical advice.   She needs to go back to see her primary care physician, neurologist, and the therapist and psychiatrist.        Xiomara Hathaway MD      [email or web address]  D:  11/27/2022 19:43  T:  11/28/2022 0:43  JOB #:  [id number]

## 2022-11-30 ENCOUNTER — HOSPITAL ENCOUNTER (OUTPATIENT)
Age: 27
Setting detail: OBSERVATION
Discharge: HOME | End: 2022-11-30
Attending: EMERGENCY MEDICINE | Admitting: OBSTETRICS & GYNECOLOGY
Payer: MEDICAID

## 2022-11-30 ENCOUNTER — ANESTHESIA EVENT (OUTPATIENT)
Dept: SURGERY | Age: 27
End: 2022-11-30
Payer: MEDICAID

## 2022-11-30 ENCOUNTER — ANESTHESIA (OUTPATIENT)
Dept: SURGERY | Age: 27
End: 2022-11-30
Payer: MEDICAID

## 2022-11-30 VITALS
OXYGEN SATURATION: 100 % | HEIGHT: 64 IN | SYSTOLIC BLOOD PRESSURE: 111 MMHG | BODY MASS INDEX: 33.29 KG/M2 | WEIGHT: 195 LBS | TEMPERATURE: 97.4 F | HEART RATE: 57 BPM | RESPIRATION RATE: 16 BRPM | DIASTOLIC BLOOD PRESSURE: 71 MMHG

## 2022-11-30 DIAGNOSIS — R10.32 LLQ ABDOMINAL PAIN: Primary | ICD-10-CM

## 2022-11-30 DIAGNOSIS — G89.18 POST-OPERATIVE PAIN: ICD-10-CM

## 2022-11-30 PROCEDURE — 76010000138 HC OR TIME 0.5 TO 1 HR: Performed by: OBSTETRICS & GYNECOLOGY

## 2022-11-30 PROCEDURE — 77030008606 HC TRCR ENDOSC KII AMR -B: Performed by: OBSTETRICS & GYNECOLOGY

## 2022-11-30 PROCEDURE — 74011250636 HC RX REV CODE- 250/636: Performed by: ANESTHESIOLOGY

## 2022-11-30 PROCEDURE — 74011000250 HC RX REV CODE- 250: Performed by: REGISTERED NURSE

## 2022-11-30 PROCEDURE — 74011250636 HC RX REV CODE- 250/636: Performed by: EMERGENCY MEDICINE

## 2022-11-30 PROCEDURE — 74011250636 HC RX REV CODE- 250/636: Performed by: REGISTERED NURSE

## 2022-11-30 PROCEDURE — 77030018684: Performed by: OBSTETRICS & GYNECOLOGY

## 2022-11-30 PROCEDURE — 96374 THER/PROPH/DIAG INJ IV PUSH: CPT

## 2022-11-30 PROCEDURE — 77030002933 HC SUT MCRYL J&J -A: Performed by: OBSTETRICS & GYNECOLOGY

## 2022-11-30 PROCEDURE — 77030010507 HC ADH SKN DERMBND J&J -B: Performed by: OBSTETRICS & GYNECOLOGY

## 2022-11-30 PROCEDURE — 2709999900 HC NON-CHARGEABLE SUPPLY: Performed by: OBSTETRICS & GYNECOLOGY

## 2022-11-30 PROCEDURE — 76210000016 HC OR PH I REC 1 TO 1.5 HR: Performed by: OBSTETRICS & GYNECOLOGY

## 2022-11-30 PROCEDURE — 77030008756 HC TU IRR SUC STRY -B: Performed by: OBSTETRICS & GYNECOLOGY

## 2022-11-30 PROCEDURE — 76060000032 HC ANESTHESIA 0.5 TO 1 HR: Performed by: OBSTETRICS & GYNECOLOGY

## 2022-11-30 PROCEDURE — 77030031139 HC SUT VCRL2 J&J -A: Performed by: OBSTETRICS & GYNECOLOGY

## 2022-11-30 PROCEDURE — 77030019905 HC CATH URETH INTMIT MDII -A: Performed by: OBSTETRICS & GYNECOLOGY

## 2022-11-30 PROCEDURE — 77030012799 HC TRCR GELPRT BLN AMR -B: Performed by: OBSTETRICS & GYNECOLOGY

## 2022-11-30 PROCEDURE — 99285 EMERGENCY DEPT VISIT HI MDM: CPT

## 2022-11-30 PROCEDURE — 74011000250 HC RX REV CODE- 250: Performed by: OBSTETRICS & GYNECOLOGY

## 2022-11-30 PROCEDURE — G0378 HOSPITAL OBSERVATION PER HR: HCPCS

## 2022-11-30 PROCEDURE — 77030016151 HC PROTCTR LNS DFOG COVD -B: Performed by: OBSTETRICS & GYNECOLOGY

## 2022-11-30 RX ORDER — NORETHINDRONE AND ETHINYL ESTRADIOL 0.5-0.035
5 KIT ORAL AS NEEDED
Status: DISCONTINUED | OUTPATIENT
Start: 2022-11-30 | End: 2022-11-30 | Stop reason: HOSPADM

## 2022-11-30 RX ORDER — OXYCODONE HYDROCHLORIDE 5 MG/1
5 TABLET ORAL
Qty: 24 TABLET | Refills: 0 | Status: SHIPPED | OUTPATIENT
Start: 2022-11-30 | End: 2022-12-05

## 2022-11-30 RX ORDER — BUPIVACAINE HYDROCHLORIDE 5 MG/ML
INJECTION, SOLUTION EPIDURAL; INTRACAUDAL AS NEEDED
Status: DISCONTINUED | OUTPATIENT
Start: 2022-11-30 | End: 2022-11-30 | Stop reason: HOSPADM

## 2022-11-30 RX ORDER — MIDAZOLAM HYDROCHLORIDE 1 MG/ML
1 INJECTION, SOLUTION INTRAMUSCULAR; INTRAVENOUS AS NEEDED
Status: DISCONTINUED | OUTPATIENT
Start: 2022-11-30 | End: 2022-11-30 | Stop reason: HOSPADM

## 2022-11-30 RX ORDER — SODIUM CHLORIDE 0.9 % (FLUSH) 0.9 %
5-40 SYRINGE (ML) INJECTION EVERY 8 HOURS
Status: DISCONTINUED | OUTPATIENT
Start: 2022-11-30 | End: 2022-11-30 | Stop reason: HOSPADM

## 2022-11-30 RX ORDER — FENTANYL CITRATE 50 UG/ML
50 INJECTION, SOLUTION INTRAMUSCULAR; INTRAVENOUS AS NEEDED
Status: DISCONTINUED | OUTPATIENT
Start: 2022-11-30 | End: 2022-11-30 | Stop reason: HOSPADM

## 2022-11-30 RX ORDER — PROPOFOL 10 MG/ML
INJECTION, EMULSION INTRAVENOUS AS NEEDED
Status: DISCONTINUED | OUTPATIENT
Start: 2022-11-30 | End: 2022-11-30 | Stop reason: HOSPADM

## 2022-11-30 RX ORDER — ONDANSETRON 2 MG/ML
4 INJECTION INTRAMUSCULAR; INTRAVENOUS
Status: COMPLETED | OUTPATIENT
Start: 2022-11-30 | End: 2022-11-30

## 2022-11-30 RX ORDER — CEFAZOLIN SODIUM 1 G/3ML
INJECTION, POWDER, FOR SOLUTION INTRAMUSCULAR; INTRAVENOUS AS NEEDED
Status: DISCONTINUED | OUTPATIENT
Start: 2022-11-30 | End: 2022-11-30 | Stop reason: HOSPADM

## 2022-11-30 RX ORDER — KETOROLAC TROMETHAMINE 30 MG/ML
INJECTION, SOLUTION INTRAMUSCULAR; INTRAVENOUS AS NEEDED
Status: DISCONTINUED | OUTPATIENT
Start: 2022-11-30 | End: 2022-11-30 | Stop reason: HOSPADM

## 2022-11-30 RX ORDER — FENTANYL CITRATE 50 UG/ML
INJECTION, SOLUTION INTRAMUSCULAR; INTRAVENOUS AS NEEDED
Status: DISCONTINUED | OUTPATIENT
Start: 2022-11-30 | End: 2022-11-30 | Stop reason: HOSPADM

## 2022-11-30 RX ORDER — HYDROMORPHONE HYDROCHLORIDE 1 MG/ML
1 INJECTION, SOLUTION INTRAMUSCULAR; INTRAVENOUS; SUBCUTANEOUS
Status: DISCONTINUED | OUTPATIENT
Start: 2022-11-30 | End: 2022-11-30 | Stop reason: HOSPADM

## 2022-11-30 RX ORDER — LIDOCAINE HYDROCHLORIDE 20 MG/ML
INJECTION, SOLUTION EPIDURAL; INFILTRATION; INTRACAUDAL; PERINEURAL AS NEEDED
Status: DISCONTINUED | OUTPATIENT
Start: 2022-11-30 | End: 2022-11-30 | Stop reason: HOSPADM

## 2022-11-30 RX ORDER — DIPHENHYDRAMINE HYDROCHLORIDE 50 MG/ML
12.5 INJECTION, SOLUTION INTRAMUSCULAR; INTRAVENOUS
Status: DISCONTINUED | OUTPATIENT
Start: 2022-11-30 | End: 2022-11-30 | Stop reason: HOSPADM

## 2022-11-30 RX ORDER — OXYCODONE HYDROCHLORIDE 5 MG/1
5 TABLET ORAL
Status: DISCONTINUED | OUTPATIENT
Start: 2022-11-30 | End: 2022-11-30 | Stop reason: HOSPADM

## 2022-11-30 RX ORDER — KETOROLAC TROMETHAMINE 30 MG/ML
15 INJECTION, SOLUTION INTRAMUSCULAR; INTRAVENOUS
Status: DISCONTINUED | OUTPATIENT
Start: 2022-11-30 | End: 2022-11-30 | Stop reason: HOSPADM

## 2022-11-30 RX ORDER — ONDANSETRON 2 MG/ML
4 INJECTION INTRAMUSCULAR; INTRAVENOUS
Status: DISCONTINUED | OUTPATIENT
Start: 2022-11-30 | End: 2022-11-30 | Stop reason: HOSPADM

## 2022-11-30 RX ORDER — MIDAZOLAM HYDROCHLORIDE 1 MG/ML
0.5 INJECTION, SOLUTION INTRAMUSCULAR; INTRAVENOUS
Status: DISCONTINUED | OUTPATIENT
Start: 2022-11-30 | End: 2022-11-30 | Stop reason: HOSPADM

## 2022-11-30 RX ORDER — DIPHENHYDRAMINE HYDROCHLORIDE 50 MG/ML
INJECTION, SOLUTION INTRAMUSCULAR; INTRAVENOUS AS NEEDED
Status: DISCONTINUED | OUTPATIENT
Start: 2022-11-30 | End: 2022-11-30 | Stop reason: HOSPADM

## 2022-11-30 RX ORDER — SODIUM CHLORIDE 0.9 % (FLUSH) 0.9 %
5-40 SYRINGE (ML) INJECTION AS NEEDED
Status: DISCONTINUED | OUTPATIENT
Start: 2022-11-30 | End: 2022-11-30 | Stop reason: HOSPADM

## 2022-11-30 RX ORDER — MORPHINE SULFATE 2 MG/ML
2 INJECTION, SOLUTION INTRAMUSCULAR; INTRAVENOUS
Status: DISCONTINUED | OUTPATIENT
Start: 2022-11-30 | End: 2022-11-30 | Stop reason: HOSPADM

## 2022-11-30 RX ORDER — SODIUM CHLORIDE, SODIUM LACTATE, POTASSIUM CHLORIDE, CALCIUM CHLORIDE 600; 310; 30; 20 MG/100ML; MG/100ML; MG/100ML; MG/100ML
100 INJECTION, SOLUTION INTRAVENOUS CONTINUOUS
Status: DISCONTINUED | OUTPATIENT
Start: 2022-11-30 | End: 2022-11-30 | Stop reason: HOSPADM

## 2022-11-30 RX ORDER — MIDAZOLAM HYDROCHLORIDE 1 MG/ML
INJECTION, SOLUTION INTRAMUSCULAR; INTRAVENOUS AS NEEDED
Status: DISCONTINUED | OUTPATIENT
Start: 2022-11-30 | End: 2022-11-30 | Stop reason: HOSPADM

## 2022-11-30 RX ORDER — DIPHENHYDRAMINE HYDROCHLORIDE 50 MG/ML
12.5 INJECTION, SOLUTION INTRAMUSCULAR; INTRAVENOUS AS NEEDED
Status: DISCONTINUED | OUTPATIENT
Start: 2022-11-30 | End: 2022-11-30 | Stop reason: HOSPADM

## 2022-11-30 RX ORDER — SODIUM CHLORIDE, SODIUM LACTATE, POTASSIUM CHLORIDE, CALCIUM CHLORIDE 600; 310; 30; 20 MG/100ML; MG/100ML; MG/100ML; MG/100ML
INJECTION, SOLUTION INTRAVENOUS
Status: DISCONTINUED | OUTPATIENT
Start: 2022-11-30 | End: 2022-11-30 | Stop reason: HOSPADM

## 2022-11-30 RX ORDER — ONDANSETRON 2 MG/ML
INJECTION INTRAMUSCULAR; INTRAVENOUS AS NEEDED
Status: DISCONTINUED | OUTPATIENT
Start: 2022-11-30 | End: 2022-11-30 | Stop reason: HOSPADM

## 2022-11-30 RX ORDER — OXYCODONE AND ACETAMINOPHEN 5; 325 MG/1; MG/1
1 TABLET ORAL AS NEEDED
Status: DISCONTINUED | OUTPATIENT
Start: 2022-11-30 | End: 2022-11-30 | Stop reason: HOSPADM

## 2022-11-30 RX ORDER — FENTANYL CITRATE 50 UG/ML
50 INJECTION, SOLUTION INTRAMUSCULAR; INTRAVENOUS
Status: DISCONTINUED | OUTPATIENT
Start: 2022-11-30 | End: 2022-11-30 | Stop reason: HOSPADM

## 2022-11-30 RX ORDER — HYDROMORPHONE HYDROCHLORIDE 1 MG/ML
0.5 INJECTION, SOLUTION INTRAMUSCULAR; INTRAVENOUS; SUBCUTANEOUS
Status: DISCONTINUED | OUTPATIENT
Start: 2022-11-30 | End: 2022-11-30 | Stop reason: HOSPADM

## 2022-11-30 RX ORDER — ROCURONIUM BROMIDE 10 MG/ML
INJECTION, SOLUTION INTRAVENOUS AS NEEDED
Status: DISCONTINUED | OUTPATIENT
Start: 2022-11-30 | End: 2022-11-30 | Stop reason: HOSPADM

## 2022-11-30 RX ORDER — LIDOCAINE HYDROCHLORIDE 10 MG/ML
0.1 INJECTION, SOLUTION EPIDURAL; INFILTRATION; INTRACAUDAL; PERINEURAL AS NEEDED
Status: DISCONTINUED | OUTPATIENT
Start: 2022-11-30 | End: 2022-11-30 | Stop reason: HOSPADM

## 2022-11-30 RX ORDER — ONDANSETRON 2 MG/ML
4 INJECTION INTRAMUSCULAR; INTRAVENOUS AS NEEDED
Status: DISCONTINUED | OUTPATIENT
Start: 2022-11-30 | End: 2022-11-30 | Stop reason: HOSPADM

## 2022-11-30 RX ORDER — OXYCODONE HYDROCHLORIDE 10 MG/1
10 TABLET ORAL
Status: DISCONTINUED | OUTPATIENT
Start: 2022-11-30 | End: 2022-11-30 | Stop reason: HOSPADM

## 2022-11-30 RX ORDER — DEXAMETHASONE SODIUM PHOSPHATE 4 MG/ML
INJECTION, SOLUTION INTRA-ARTICULAR; INTRALESIONAL; INTRAMUSCULAR; INTRAVENOUS; SOFT TISSUE AS NEEDED
Status: DISCONTINUED | OUTPATIENT
Start: 2022-11-30 | End: 2022-11-30 | Stop reason: HOSPADM

## 2022-11-30 RX ADMIN — ONDANSETRON HYDROCHLORIDE 4 MG: 2 SOLUTION INTRAMUSCULAR; INTRAVENOUS at 12:32

## 2022-11-30 RX ADMIN — FENTANYL CITRATE 50 MCG: 0.05 INJECTION, SOLUTION INTRAMUSCULAR; INTRAVENOUS at 16:28

## 2022-11-30 RX ADMIN — CEFAZOLIN SODIUM 2 G: 1 INJECTION, POWDER, FOR SOLUTION INTRAMUSCULAR; INTRAVENOUS at 15:02

## 2022-11-30 RX ADMIN — ONDANSETRON 4 MG: 2 INJECTION INTRAMUSCULAR; INTRAVENOUS at 15:09

## 2022-11-30 RX ADMIN — DIPHENHYDRAMINE HYDROCHLORIDE 25 MG: 50 INJECTION, SOLUTION INTRAMUSCULAR; INTRAVENOUS at 15:07

## 2022-11-30 RX ADMIN — KETOROLAC TROMETHAMINE 30 MG: 30 INJECTION, SOLUTION INTRAMUSCULAR at 15:31

## 2022-11-30 RX ADMIN — FENTANYL CITRATE 50 MCG: 50 INJECTION, SOLUTION INTRAMUSCULAR; INTRAVENOUS at 15:11

## 2022-11-30 RX ADMIN — SUGAMMADEX 300 MG: 100 INJECTION, SOLUTION INTRAVENOUS at 15:34

## 2022-11-30 RX ADMIN — DEXAMETHASONE SODIUM PHOSPHATE 4 MG: 4 INJECTION, SOLUTION INTRA-ARTICULAR; INTRALESIONAL; INTRAMUSCULAR; INTRAVENOUS; SOFT TISSUE at 15:09

## 2022-11-30 RX ADMIN — FENTANYL CITRATE 50 MCG: 0.05 INJECTION, SOLUTION INTRAMUSCULAR; INTRAVENOUS at 16:11

## 2022-11-30 RX ADMIN — PROPOFOL 200 MG: 10 INJECTION, EMULSION INTRAVENOUS at 14:54

## 2022-11-30 RX ADMIN — SODIUM CHLORIDE, POTASSIUM CHLORIDE, SODIUM LACTATE AND CALCIUM CHLORIDE: 600; 310; 30; 20 INJECTION, SOLUTION INTRAVENOUS at 14:45

## 2022-11-30 RX ADMIN — FENTANYL CITRATE 50 MCG: 50 INJECTION, SOLUTION INTRAMUSCULAR; INTRAVENOUS at 15:15

## 2022-11-30 RX ADMIN — MIDAZOLAM HYDROCHLORIDE 2 MG: 2 INJECTION, SOLUTION INTRAMUSCULAR; INTRAVENOUS at 14:47

## 2022-11-30 RX ADMIN — ROCURONIUM BROMIDE 50 MG: 10 INJECTION, SOLUTION INTRAVENOUS at 14:55

## 2022-11-30 RX ADMIN — LIDOCAINE HYDROCHLORIDE 100 MG: 20 INJECTION, SOLUTION EPIDURAL; INFILTRATION; INTRACAUDAL; PERINEURAL at 14:54

## 2022-11-30 NOTE — ANESTHESIA PREPROCEDURE EVALUATION
Relevant Problems   RESPIRATORY SYSTEM   (+) Mild intermittent asthma      NEUROLOGY   (+) Depression with suicidal ideation   (+) Depression, major, severe recurrence (HCC)   (+) Major depression   (+) Moderate depressed bipolar I disorder (HCC)   (+) Seizure disorder (HCC)      HEMATOLOGY   (+) Anemia       Anesthetic History   No history of anesthetic complications            Review of Systems / Medical History  Patient summary reviewed and pertinent labs reviewed    Pulmonary          Smoker         Neuro/Psych     seizures    Psychiatric history     Cardiovascular  Within defined limits                Exercise tolerance: >4 METS     GI/Hepatic/Renal  Within defined limits              Endo/Other        Obesity     Other Findings            Past Medical History:   Diagnosis Date    Anemia 11/13/2019    Asthma     Depression     Epilepsy ([facility] [address]     Mood disorder (HCC)     Seizures (HCC)     last seizure two weeks ago    Sleep disorder     Suicidal thoughts        Past Surgical History:   Procedure Laterality Date    HX OTHER SURGICAL Left 2008    Left eye cyst removal       Current Outpatient Medications   Medication Instructions    albuterol (PROVENTIL HFA, VENTOLIN HFA, PROAIR HFA) 90 mcg/actuation inhaler 2 Puffs, Inhalation, EVERY 4 HOURS AS NEEDED    ARIPiprazole (ABILIFY) 5 mg, Oral, DAILY    brivaracetam (BRIVIACT) 100 mg, Oral, 2 TIMES DAILY    escitalopram oxalate (LEXAPRO) 20 mg, Oral, DAILY    guaiFENesin ER (MUCINEX) 600 mg, Oral, 2 TIMES DAILY    lacosamide (VIMPAT) 100 mg, Oral, 2 TIMES DAILY    lacosamide (VIMPAT) 200 mg, Oral, 2 TIMES DAILY    levETIRAcetam (KEPPRA) 500 mg, Oral, 2 TIMES DAILY    levETIRAcetam 1,000 mg, Oral, 2 TIMES DAILY    mirtazapine (REMERON) 15 mg, Oral, EVERY BEDTIME    omeprazole (PRILOSEC) 20 mg capsule omeprazole 20 mg capsule,delayed release    ondansetron (ZOFRAN ODT) 8 mg, Oral, EVERY 8 HOURS AS NEEDED    prenatal vit-calcium-iron-fa (PRENATAL PLUS with CALCIUM) 27 mg iron- 1 mg tab 1 Tablet, DAILY    traZODone (DESYREL) 50 mg, Oral, BEDTIME PRN    venlafaxine-SR (EFFEXOR-XR) 150 mg capsule venlafaxine  mg capsule,extended release 24 hr   TK 1 C PO QD       No current facility-administered medications for this encounter.        Patient Vitals for the past 24 hrs:   Temp Pulse Resp BP SpO2   11/30/22 1405 -- 65 21 116/74 100 %   11/30/22 1209 36.7 °C (98.1 °F) 64 15 103/69 100 %       Lab Results   Component Value Date/Time    WBC 5.3 11/26/2022 11:49 AM    HGB 10.8 (L) 11/26/2022 11:49 AM    HCT 34.4 (L) 11/26/2022 11:49 AM    PLATELET 391 08/44/2541 11:49 AM    MCV 81.7 11/26/2022 11:49 AM    Hgb, External 10.8 03/29/2016 12:00 AM    Hct, External 32.8 03/29/2016 12:00 AM    Platelet cnt., External 252 03/29/2016 12:00 AM     Lab Results   Component Value Date/Time    Sodium 138 11/26/2022 11:49 AM    Potassium 3.9 11/26/2022 11:49 AM    Chloride 106 11/26/2022 11:49 AM    CO2 29 11/26/2022 11:49 AM    Anion gap 3 (L) 11/26/2022 11:49 AM    Glucose 98 11/26/2022 11:49 AM    BUN 4 (L) 11/26/2022 11:49 AM    Creatinine 0.73 11/26/2022 11:49 AM    BUN/Creatinine ratio 5 (L) 11/26/2022 11:49 AM    GFR est AA >60 08/07/2022 09:20 AM    GFR est non-AA >60 08/07/2022 09:20 AM    Calcium 9.0 11/26/2022 11:49 AM     No results found for: APTT, PTP, INR, INREXT  Lab Results   Component Value Date/Time    Glucose 98 11/26/2022 11:49 AM    Glucose (POC) 98 11/13/2022 04:15 PM         Physical Exam    Airway  Mallampati: II  TM Distance: 4 - 6 cm  Neck ROM: normal range of motion   Mouth opening: Normal     Cardiovascular    Rhythm: regular  Rate: normal         Dental  No notable dental hx       Pulmonary  Breath sounds clear to auscultation               Abdominal  GI exam deferred       Other Findings            Anesthetic Plan    ASA: 2, emergent  Anesthesia type: general    Monitoring Plan: Continuous noninvasive hemodynamic monitoring      Induction: Intravenous  Anesthetic plan and risks discussed with: Patient

## 2022-11-30 NOTE — PROGRESS NOTES
Problem: Surgical Pathway Day of Surgery  Goal: Activity/Safety  11/30/2022 1816 by Nikhil Aguilar RN  Outcome: Resolved/Met  11/30/2022 1657 by Nikhil Aguilar RN  Outcome: Progressing Towards Goal  Goal: Nutrition/Diet  11/30/2022 1816 by Nikhil Aguilar RN  Outcome: Resolved/Met  11/30/2022 1657 by Nikhil Aguilar RN  Outcome: Progressing Towards Goal  Goal: Medications  11/30/2022 1816 by Nikhil Aguilar RN  Outcome: Resolved/Met  11/30/2022 1657 by Nikhil Aguilar RN  Outcome: Progressing Towards Goal  Goal: Respiratory  11/30/2022 1816 by Nikhil Aguilar RN  Outcome: Resolved/Met  11/30/2022 1657 by Nikhil Aguilar RN  Outcome: Progressing Towards Goal  Goal: Treatments/Interventions/Procedures  11/30/2022 1816 by Nikhil Aguilar RN  Outcome: Resolved/Met  11/30/2022 1657 by Nikhil Aguilar RN  Outcome: Progressing Towards Goal  Goal: Psychosocial  11/30/2022 1816 by Nikhil Aguilar, RN  Outcome: Resolved/Met  11/30/2022 1657 by Nikhil Aguilar RN  Outcome: Progressing Towards Goal  Goal: *No signs and symptoms of infection or wound complications  72/61/3397 1816 by Nikhil Aguilar RN  Outcome: Resolved/Met  11/30/2022 1657 by Nikhil Aguilar RN  Outcome: Progressing Towards Goal  Goal: *Optimal pain control at patient's stated goal  11/30/2022 1816 by Nikhil Aguilar RN  Outcome: Resolved/Met  11/30/2022 1657 by Nikhil Aguilar RN  Outcome: Progressing Towards Goal  Goal: *Adequate urinary output (equal to or greater than 30 milliliters/hour)  Description: Ambulatory Surgery patients voiding without difficulty.   11/30/2022 1816 by Nikhil Aguilar RN  Outcome: Resolved/Met  11/30/2022 1657 by Nikhil Aguilar RN  Outcome: Progressing Towards Goal  Goal: *Tolerating diet  11/30/2022 1816 by Nikhil Aguilar RN  Outcome: Resolved/Met  11/30/2022 1657 by Nikhil Aguilar RN  Outcome: Progressing Towards Goal  Goal: *Demonstrates progressive activity  11/30/2022 1816 by Jonah Fink RN  Outcome: Resolved/Met  11/30/2022 1657 by Jonah Fink RN  Outcome: Progressing Towards Goal     Problem: Surgical Pathway: Discharge Outcomes  Goal: *Hemodynamically stable  Outcome: Resolved/Met  Goal: *Lungs clear or at baseline  Outcome: Resolved/Met  Goal: *Demonstrates independent activity or return to baseline  Outcome: Resolved/Met  Goal: *Optimal pain control at patient's stated goal  Outcome: Resolved/Met  Goal: *Verbalizes understanding and describes prescribed diet  Outcome: Resolved/Met  Goal: *Tolerating diet  Outcome: Resolved/Met  Goal: *Verbalizes name, dosage, time, side effects, and number of days to continue medications  Outcome: Resolved/Met  Goal: *No signs and symptoms of infection or wound complications  Outcome: Resolved/Met  Goal: *Anxiety reduced or absent  Outcome: Resolved/Met  Goal: *Understands and describes signs and symptoms to report to providers(Stroke Metric)  Outcome: Resolved/Met  Goal: *Describes follow-up/return visits to physicians  Outcome: Resolved/Met  Goal: *Describes available resources and support systems  Outcome: Resolved/Met    4573 Discharge instructions reviewed with patient. Patient aware Rx's were sent to pharmacy and aware when can take next dose. Aware of warning signs and when to notify MD.     Discharge plan of care/case management plan validated with provider discharge order. 1825 Patient discharged to main entrance via wheelchair in stable condition .

## 2022-11-30 NOTE — H&P
History and Physical    Patient: Chiquita Moran MRN: [id number]  SSN: [id number]    YOB: 1995  Age: 32 y.o. Sex: female      Subjective:      Chiquita Moran is a 32 y.o. female with LLQ pain x 3 days, nausea and vomiting, worsening pain. Seen at outside ER( [facility]) and transferred here for evaluation of left adnexal torsion- records not available. Hx of Epilepsy( last seizure- 3 days ago per pt), no previous abdominal surgeries, 2  in the past. 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 No pertinent family history. Social History     Tobacco Use    Smoking status: Every Day    Smokeless tobacco: Current   Substance Use Topics    Alcohol use: Yes      Prior to Admission medications    Medication Sig Start Date End Date Taking? Authorizing Provider   levETIRAcetam (Keppra) 500 mg tablet Take 1 Tablet by mouth two (2) times a day for 30 days. 22  Nathaniel MA DO   lacosamide (VIMPAT) 200 mg tab tablet Take 200 mg by mouth two (2) times a day. Provider, Historical   mirtazapine (REMERON) 15 mg tablet Take 15 mg by mouth nightly. Provider, Historical   brivaracetam (BRIVIACT) 100 mg tablet Take 100 mg by mouth two (2) times a day. Provider, Historical   albuterol (PROVENTIL HFA, VENTOLIN HFA, PROAIR HFA) 90 mcg/actuation inhaler Take 2 Puffs by inhalation every four (4) hours as needed for Wheezing, Shortness of Breath or Respiratory Distress. Patient not taking: Reported on 2022   Omar Simon MD   ARIPiprazole (ABILIFY) 5 mg tablet Take 1 Tablet by mouth daily.  Indications: additional medications to treat depression 6/2/21   Mario Duncan MD   escitalopram oxalate (LEXAPRO) 20 mg tablet Take 1 Tablet by mouth daily. Indications: major depressive disorder 6/2/21   Mario Duncan MD   lacosamide (VIMPAT) 100 mg tab tablet Take 1 Tablet by mouth two (2) times a day. Max Daily Amount: 200 mg. 6/1/21   Mario Duncan MD   levETIRAcetam 1,000 mg tablet Take 1 Tablet by mouth two (2) times a day. Indications: additional medication for tonic-clonic epilepsy  Patient not taking: No sig reported 6/1/21   Mario Duncan MD   traZODone (DESYREL) 50 mg tablet Take 1 Tablet by mouth nightly as needed for Sleep. Indications: insomnia associated with depression  Patient not taking: No sig reported 6/1/21   Mario Duncan MD   guaiFENesin ER (MUCINEX) 600 mg ER tablet Take 1 Tablet by mouth two (2) times a day. Patient not taking: No sig reported 5/26/21   Isabel Holcomb MD   venlafaxine-SR [facility]) 150 mg capsule venlafaxine  mg capsule,extended release 24 hr   TK 1 C PO QD  Patient not taking: No sig reported    Provider, Historical   ondansetron (ZOFRAN ODT) 8 mg disintegrating tablet Take 1 Tab by mouth every eight (8) hours as needed for Nausea. Patient not taking: No sig reported 1/26/21   Purnima Camara MD   omeprazole (PRILOSEC) 20 mg capsule omeprazole 20 mg capsule,delayed release  Patient not taking: No sig reported    Provider, Historical   prenatal vit-calcium-iron-fa (PRENATAL PLUS with CALCIUM) 27 mg iron- 1 mg tab Take 1 Tab by mouth daily. Indications: PREGNANCY  Patient not taking: No sig reported    Provider, Historical        Allergies   Allergen Reactions    Citrus And Derivatives Hives    Lamictal [Lamotrigine] Nausea and Vomiting       Review of Systems:  A comprehensive review of systems was negative except for that written in the History of Present Illness.     Objective:     Vitals:    11/30/22 1209   BP: 103/69   Pulse: 64   Resp: 15   Temp: 98.1 °F (36.7 °C)   SpO2: 100%   Weight: 195 lb (88.5 kg)   Height: 5' 4\" (1.626 m)        Physical Exam:  GENERAL: alert, cooperative, moderate distress, appears stated age  Abd: Tender LLQ on exam    Assessment:     Hospital Problems  Date Reviewed: 7/9/2015            Codes Class Noted POA    LLQ pain ICD-10-CM: R10.32  ICD-9-CM: 789.04  11/30/2022 Unknown       Anatomy DWP- ovarian torsion DWP Questions addressed    Plan:     Diag.  Lap- DWP risks benefits and alternatives including possible salpingoophrectomy    Signed By: Marina Grove MD     November 30, 2022

## 2022-11-30 NOTE — BRIEF OP NOTE
Brief Postoperative Note    Patient: Lilly Duff  YOB: 1995  MRN: [id number]    Date of Procedure: 11/30/2022     Pre-Op Diagnosis: LLQ pain, intermittent ovarian torsion    Post-Op Diagnosis: Same as preoperative diagnosis.       Procedure(s):  LAPAROSCOPY GYN DIAGNOSTIC AND ASSISTED CYST ASPIRATION    Surgeon(s):  Abbott Saint, MD    Surgical Assistant: Surg Asst-1: Booker Lefort    Anesthesia: General     Estimated Blood Loss (mL): Minimal    Complications: None    Specimens: none    Implants: None    Drains: St cath- clear urine    Findings: Normal uterus and adnexa bilaterally, small cyst on the left ovary, left adnexa up and out of the pelvis in the left lateral anterior cul de sac- no signs of necrosis, , small cyst aspirated with clear fluid, some free fluid in posterior cul de sac and anterior cul de sac c/w recent cyst rupture, no signs of endometriosis    Electronically Signed by Suzie Munson MD on 11/30/2022 at 3:55 PM

## 2022-11-30 NOTE — ANESTHESIA POSTPROCEDURE EVALUATION
Procedure(s):  LAPAROSCOPY GYN DIAGNOSTIC AND ASSISTED ASPIRATION. general    Anesthesia Post Evaluation      Multimodal analgesia: multimodal analgesia used between 6 hours prior to anesthesia start to PACU discharge  Patient location during evaluation: PACU  Patient participation: complete - patient participated  Level of consciousness: awake  Pain score: 0  Pain management: adequate  Airway patency: patent  Anesthetic complications: no  Cardiovascular status: acceptable  Respiratory status: acceptable  Hydration status: acceptable  Post anesthesia nausea and vomiting:  controlled  Final Post Anesthesia Temperature Assessment:  Normothermia (36.0-37.5 degrees C)      INITIAL Post-op Vital signs:   Vitals Value Taken Time   /81 11/30/22 1555   Temp 36.7 °C (98 °F) 11/30/22 1550   Pulse 62 11/30/22 1556   Resp 19 11/30/22 1556   SpO2 100 % 11/30/22 1550   Vitals shown include unvalidated device data.

## 2022-11-30 NOTE — ED TRIAGE NOTES
Pt arrived via ems from [facility] er as a transfer for possible Ovarian Torsion. Pt has had nausea and vomiting for multiple days with no relief. , states pcp thought it couldve been due to keppra levels, but at [facility] scan showed possible torsion per pt. Pt had Zofran and Toradol at [facility]. Last episode of vomiting was this morning. Pt states possibility of pregnancy. Pt on arrival has 20g IV in RAC.

## 2022-11-30 NOTE — ROUTINE PROCESS
TRANSFER - OUT REPORT:    Verbal report given to Ray Wilson (name) on [facility]  being transferred to (unit) for routine post - op       Report consisted of patients Situation, Background, Assessment and   Recommendations(SBAR). Information from the following report(s) SBAR, OR Summary, Procedure Summary, Intake/Output, and Dual Neuro Assessment was reviewed with the receiving nurse. Opportunity for questions and clarification was provided.       Patient transported with:   Registered Nurse   [employer] and Lashonda Puente

## 2022-11-30 NOTE — PROGRESS NOTES
1642 TRANSFER - IN REPORT:    Verbal report received from Diana butt Kiko Farley  being received from PACU for routine post - op      Report consisted of patients Situation, Background, Assessment and   Recommendations(SBAR). Information from the following report(s) SBAR was reviewed with the receiving nurse. Opportunity for questions and clarification was provided. Assessment completed upon patients arrival to unit and care assumed. Transferred from PACU. Room orientation completed. Instructed to call first time ambulating due to fall precautions. Call bell within reach. Problem: Surgical Pathway Day of Surgery  Goal: Activity/Safety  Outcome: Progressing Towards Goal  Goal: Nutrition/Diet  Outcome: Progressing Towards Goal  Goal: Medications  Outcome: Progressing Towards Goal  Goal: Respiratory  Outcome: Progressing Towards Goal  Goal: Treatments/Interventions/Procedures  Outcome: Progressing Towards Goal  Goal: Psychosocial  Outcome: Progressing Towards Goal  Goal: *No signs and symptoms of infection or wound complications  Outcome: Progressing Towards Goal  Goal: *Optimal pain control at patient's stated goal  Outcome: Progressing Towards Goal  Goal: *Adequate urinary output (equal to or greater than 30 milliliters/hour)  Description: Ambulatory Surgery patients voiding without difficulty.   Outcome: Progressing Towards Goal  Goal: *Tolerating diet  Outcome: Progressing Towards Goal  Goal: *Demonstrates progressive activity  Outcome: Progressing Towards Goal

## 2022-11-30 NOTE — ED PROVIDER NOTES
EMERGENCY DEPARTMENT HISTORY AND PHYSICAL EXAM      Date: 11/30/2022  Patient Name: Genevieve Strong    History of Presenting Illness     Chief Complaint   Patient presents with    Abdominal Pain       History Provided By: Patient    HPI: Genevieve Strong, 32 y.o. female with no past medical history presenting with nausea, left lower quad abdominal pain. Pain has been constant with vomiting over the past 2 to 3 days. No radiation. Denies any vaginal bleeding or discharge. She was seen at outside hospital and had normal CBC and CMP. Her urine showed no blood, small leuk esterase, 1-5 WBC and few bacteria. Pregnancy test was negative. They were unable to see her left ovary well on ultrasound and could not obtain arterial or venous waveforms. There was concern for possible intermittent torsion. Patient was transferred here for OB/GYN evaluation. Pain currently is 5/10. There are no other complaints, changes, or physical findings at this time. Past History     Past Medical History:  Past Medical History:   Diagnosis Date    Anemia 11/13/2019    Asthma     Depression     Epilepsy ([facility] [address]     Mood disorder (HCC)     Seizures (HCC)     last seizure two weeks ago    Sleep disorder     Suicidal thoughts        Past Surgical History:  Past Surgical History:   Procedure Laterality Date    HX OTHER SURGICAL Left 2008    Left eye cyst removal       Family History:  History reviewed. No pertinent family history. Social History:  Social History     Tobacco Use    Smoking status: Every Day    Smokeless tobacco: Current   Substance Use Topics    Alcohol use: Yes    Drug use: No       Allergies: Allergies   Allergen Reactions    Citrus And Derivatives Hives    Lamictal [Lamotrigine] Nausea and Vomiting       PCP: Alis Sosa MD    No current facility-administered medications on file prior to encounter.      Current Outpatient Medications on File Prior to Encounter   Medication Sig Dispense Refill  levETIRAcetam (Keppra) 500 mg tablet Take 1 Tablet by mouth two (2) times a day for 30 days. 60 Tablet 0    lacosamide (VIMPAT) 200 mg tab tablet Take 200 mg by mouth two (2) times a day.  mirtazapine (REMERON) 15 mg tablet Take 15 mg by mouth nightly.  brivaracetam (BRIVIACT) 100 mg tablet Take 100 mg by mouth two (2) times a day.  albuterol (PROVENTIL HFA, VENTOLIN HFA, PROAIR HFA) 90 mcg/actuation inhaler Take 2 Puffs by inhalation every four (4) hours as needed for Wheezing, Shortness of Breath or Respiratory Distress. (Patient not taking: Reported on 11/13/2022) 1 Inhaler 0    ARIPiprazole (ABILIFY) 5 mg tablet Take 1 Tablet by mouth daily. Indications: additional medications to treat depression 30 Tablet 0    escitalopram oxalate (LEXAPRO) 20 mg tablet Take 1 Tablet by mouth daily. Indications: major depressive disorder 30 Tablet 0    lacosamide (VIMPAT) 100 mg tab tablet Take 1 Tablet by mouth two (2) times a day. Max Daily Amount: 200 mg. 60 Tablet 0    levETIRAcetam 1,000 mg tablet Take 1 Tablet by mouth two (2) times a day. Indications: additional medication for tonic-clonic epilepsy (Patient not taking: No sig reported) 60 Tablet 0    traZODone (DESYREL) 50 mg tablet Take 1 Tablet by mouth nightly as needed for Sleep. Indications: insomnia associated with depression (Patient not taking: No sig reported) 30 Tablet 0    guaiFENesin ER (MUCINEX) 600 mg ER tablet Take 1 Tablet by mouth two (2) times a day. (Patient not taking: No sig reported) 30 Tablet 0    venlafaxine-SR (EFFEXOR-XR) 150 mg capsule venlafaxine  mg capsule,extended release 24 hr   TK 1 C PO QD (Patient not taking: No sig reported)      ondansetron (ZOFRAN ODT) 8 mg disintegrating tablet Take 1 Tab by mouth every eight (8) hours as needed for Nausea.  (Patient not taking: No sig reported) 30 Tab 2    omeprazole (PRILOSEC) 20 mg capsule omeprazole 20 mg capsule,delayed release (Patient not taking: No sig reported)      prenatal vit-calcium-iron-fa (PRENATAL PLUS with CALCIUM) 27 mg iron- 1 mg tab Take 1 Tab by mouth daily. Indications: PREGNANCY (Patient not taking: No sig reported)         Review of Systems   Review of Systems   Constitutional:  Negative for chills and fever. HENT:  Negative for sore throat. Eyes:  Negative for redness. Respiratory:  Negative for shortness of breath. Cardiovascular:  Negative for chest pain. Gastrointestinal:  Positive for abdominal pain, nausea and vomiting. Genitourinary:  Negative for dysuria, flank pain, vaginal bleeding and vaginal discharge. Musculoskeletal:  Negative for myalgias. Skin:  Negative for rash. Neurological:  Negative for headaches. Physical Exam   Physical Exam  Vitals and nursing note reviewed. Constitutional:       General: She is not in acute distress. Appearance: Normal appearance. HENT:      Head: Normocephalic and atraumatic. Mouth/Throat:      Mouth: Mucous membranes are moist.   Eyes:      Extraocular Movements: Extraocular movements intact. Conjunctiva/sclera: Conjunctivae normal.   Cardiovascular:      Rate and Rhythm: Normal rate and regular rhythm. Pulmonary:      Effort: Pulmonary effort is normal. No respiratory distress. Breath sounds: Normal breath sounds. No wheezing, rhonchi or rales. Abdominal:      General: There is no distension. Palpations: Abdomen is soft. Tenderness: There is abdominal tenderness (mild) in the left lower quadrant. Musculoskeletal:         General: Normal range of motion. Cervical back: Normal range of motion. Skin:     General: Skin is warm and dry. Neurological:      General: No focal deficit present. Mental Status: She is alert and oriented to person, place, and time. Mental status is at baseline. Lab and Diagnostic Study Results   Labs -   No results found for this or any previous visit (from the past 12 hour(s)).     Radiologic Studies -   @lastxrresult@  CT Results  (Last 48 hours)      None          CXR Results  (Last 48 hours)      None            Medical Decision Making and ED Course   Differential Diagnosis & Medical Decision Making Provider Note:   49-year-old female sent here for concern for recurrent torsion with left lower quadrant abdominal pain, tenderness, nausea, vomiting and limited ultrasound findings. Mild tenderness on exam, pain controlled. Work-up from outside hospital shows clear urine with a low likelihood for pyelonephritis, kidney stone. Discussed with OB/GYN who will take her to the OR    - I am the first provider for this patient. I reviewed the vital signs, available nursing notes, past medical history, past surgical history, family history and social history. The patients presenting problems have been discussed, and they are in agreement with the care plan formulated and outlined with them. I have encouraged them to ask questions as they arise throughout their visit. Vital Signs-Reviewed the patient's vital signs.   Patient Vitals for the past 12 hrs:   Temp Pulse Resp BP SpO2   11/30/22 1605 -- (!) 54 15 129/86 --   11/30/22 1600 -- (!) 57 15 132/82 100 %   11/30/22 1555 -- (!) 51 16 126/81 100 %   11/30/22 1550 98 °F (36.7 °C) 61 18 124/79 100 %   11/30/22 1544 98 °F (36.7 °C) 72 19 128/84 100 %   11/30/22 1440 -- (!) 58 21 104/69 100 %   11/30/22 1405 -- 65 21 116/74 100 %   11/30/22 1209 98.1 °F (36.7 °C) 64 15 103/69 100 %       ED Course:   ED Course as of 11/30/22 1610   Wed Nov 30, 2022   1247 Spoke with Dr Ronda Marks, obgyn who will come evaluate patient [QD]   4068 Dr Ronda Marks at bedside [KK]   1350 Patient will be going to the OR with Dr. Amanda Donovan   94 31 11 Evalauted by Dr Ronda Marks, he will take her to OR and will likely be discharged from there [address]      ED Course User Index  [KK] Messi Comer MD            Disposition   Disposition: to OR with Dr Ronda Marks, will discharge from same day Diagnosis/Clinical Impression     Clinical Impression:   1. LLQ abdominal pain             Attestations: Jeaneth NEW MD, am the primary clinician of record. Please note that this dictation was completed with [employer], the computer voice recognition software. Quite often unanticipated grammatical, syntax, homophones, and other interpretive errors are inadvertently transcribed by the computer software. Please disregard these errors. Please excuse any errors that have escaped final proofreading. Thank you.

## 2022-11-30 NOTE — PERIOP NOTES
3E nurse states she was reviewing pt's PMH, and noted that pt was recently suicidal. Nurse asked was pt suicidal now. I told the nurse I was not aware of any suidical  problems.

## 2022-12-01 NOTE — OP NOTES
[address] [place] [address]  OPERATIVE REPORT    Name:  Ravinder Squires  MR#:  [id number]  :  1995  ACCOUNT #:  [de-identified]  DATE OF SERVICE:  2022    PREOPERATIVE DIAGNOSES:  Left lower quadrant pain, intermittent; torsion of adnexa. POSTOPERATIVE DIAGNOSIS:  Left lower quadrant pain, intermittent; torsion of adnexa. PROCEDURE PERFORMED:  1. Diagnostic laparoscopy with video. 2.  Left ovarian cyst aspiration. SURGEON:  Savanah Oliva MD    ASSISTANT:  Alexandra Johns. ANESTHESIA:  General.    COMPLICATIONS:  None. SPECIMENS REMOVED:  None. IMPLANTS:  None. ESTIMATED BLOOD LOSS:  Minimal.    DRAINS:  Straight catheter. Clear urine. SURGICAL FINDINGS:  Normal uterus. Normal adnexa bilaterally. No signs of endometriosis. Small cyst on the left ovary. The left ovary was flipped up out of the pelvis and sitting in the anterior lateral cul-de-sac. Small amount of free fluid aspirated from the left ovarian cyst.  There was free fluid in the posterior cul-de-sac and the anterior cul-de-sac consistent with recent cyst rupture. PROCEDURE:  The patient was taken to operating room. After an informed consent had been reviewed, she was placed on the operating room table in the supine position and administered general anesthesia. Her legs were then placed in [address] [place] [address], and she was prepped and draped in normal sterile fashion. A time-out was performed by me. After this was done, her bladder was drained using a straight catheter. A Hulka tenaculum was then placed inside the uterus. Attention was turned to the abdomen where an infraumbilical skin incision was made with a scalpel, taken down to the underlying layer of fascia. Fascia was grasped with Kocher clamps x2, tented up and entered sharply. The peritoneum was identified, tented up and entered sharply. After direct vision into the abdomen was noted, a blunt trocar was placed. It was secured.   The abdomen was then insufflated while monitoring pressures and flow. The laparoscope was placed. The uterus was manipulated out of the pelvis. The above findings were noted. A second 5-mm trocar site was placed under direct vision in the left lateral quadrant. After this was done, a grasper was advanced through there and the left adnexa was flipped back into its normal position. No signs of necrosis were noted. At this point, the rest of the pelvis was examined. The above findings were noted. The needle aspirator was utilized to aspirate the small cyst on the left ovary. Clear fluid was noted. After this was done, there were some minimal adhesions of the sigmoid to the left lateral sidewall. These were taken down with the laparoscopic angy without complications. Once this was done, all areas were inspected and noted to be hemostatic. The previously mentioned fluid from what appeared to be a recent cyst rupture was removed using the suction device. After this was done, all instruments were removed from the abdomen. All trocars were removed and the abdomen was then desufflated. The infraumbilical fascia was reapproximated using a 2-0 Vicryl in an interrupted fashion. The skin was then closed using a 4-0 Monocryl in subcuticular fashion. Local anesthetic was injected. Dermabond was placed. The Hulka tenaculum was removed. Sponge, lap, and needle counts were correct x2. The patient tolerated the procedure without complications and was taken to the recovery room in stable condition.         MD DORIE [email or web address]  D:  11/30/2022 16:05  T:  11/30/2022 19:43  JOB #:  [id number]

## 2023-01-01 NOTE — GROUP NOTE
Abnormal Test Results     Name: Cl escudero,   [phone]    Ordering Provider: Rosemary Perez     Test Name: Avila    Test Date: 1352 6/4    Test Value: 16.4    Test Normal Range: 0.2-3.5      -------------------------------------------------------  Is the patient having symptoms? No    Patient is   Breast feeding, usually every 2 hours, about 10-15 mins on each breast   No phototherapy at home    Wet diapers in last 8 hours- 4 hours  BM in last 8 hours- 3 small BM, loose mustard yellow color   Activity level- No major sleeping changes, patient is alert when feeding,etc.     Medical advice is: No new orders, patient to follow up with their pediatrician 6/5.     Notified patient/family/guardian of the medical advice from MD regarding the critical lab above and is aware that MD was notified of the lab results.  Verbalized understanding of above.   [facility]  GROUP DOCUMENTATION INDIVIDUAL                                                                          Group Therapy Note    Date: 6/2/2021    Group Start Time: 1100  Group End Time: 9430  Group Topic: Education Group - Inpatient    [facility] 2  NON ACUTE    Osvaldo Castillo [address] [facility] GROUP DOCUMENTATION GROUP    Group Therapy Note    Attendees: 9 out of 10    Patients were invited to group and taught about the importance of developing a Suicide Safety Plan for times of crises. Patients were each asked to complete a plan using a worksheet that was provided for them and to share their responses openly in the group setting as they felt comfortable doing so. Lastly, the patients were asked to listen respectfully to and be supportive of their peers while they listened to one another share. Attendance: Attended    Patient's Goal:  Develop and discuss a Suicide Safety Plan for times of emotional crisis. Interventions/techniques: Informed, Validated, Promoted peer support, Provide feedback, Reinforced and Supported    Follows Directions: Followed directions    Interactions: Interacted appropriately    Mental Status: Calm and Flat    Behavior/appearance: Attentive and Cooperative    Goals Achieved: Able to listen to others, Able to reflect/comment on own behavior, Able to manage/cope with feelings, Able to receive feedback, Able to self-disclose, Discussed safety plan, Displayed empathy, Identified feelings and Identified triggers      Additional Notes:  Pt participated in group fully but chose not share very much information. Pt stated that she had in fact already completed a safety plan just a day prior with her , but she was still willing to complete one again. Pt was calm and appropriate during the group meeting.      [place]

## 2023-01-25 ENCOUNTER — HOSPITAL ENCOUNTER (EMERGENCY)
Age: 28
Discharge: HOME | End: 2023-01-25
Attending: STUDENT IN AN ORGANIZED HEALTH CARE EDUCATION/TRAINING PROGRAM
Payer: MEDICAID

## 2023-01-25 VITALS
BODY MASS INDEX: 34.15 KG/M2 | HEIGHT: 64 IN | WEIGHT: 200 LBS | DIASTOLIC BLOOD PRESSURE: 84 MMHG | SYSTOLIC BLOOD PRESSURE: 108 MMHG | HEART RATE: 95 BPM | RESPIRATION RATE: 18 BRPM | TEMPERATURE: 98.3 F | OXYGEN SATURATION: 98 %

## 2023-01-25 DIAGNOSIS — G40.919 BREAKTHROUGH SEIZURE (HCC): Primary | ICD-10-CM

## 2023-01-25 LAB
ALBUMIN SERPL-MCNC: 3.1 G/DL (ref 3.5–5)
ALBUMIN/GLOB SERPL: 0.7 (ref 1.1–2.2)
ALP SERPL-CCNC: 90 U/L (ref 45–117)
ALT SERPL-CCNC: 14 U/L (ref 12–78)
ANION GAP SERPL CALC-SCNC: 6 MMOL/L (ref 5–15)
AST SERPL W P-5'-P-CCNC: 5 U/L (ref 15–37)
BASOPHILS # BLD: 0.1 K/UL (ref 0–0.1)
BASOPHILS NFR BLD: 1 % (ref 0–1)
BILIRUB SERPL-MCNC: 0.2 MG/DL (ref 0.2–1)
BUN SERPL-MCNC: 9 MG/DL (ref 6–20)
BUN/CREAT SERPL: 12 (ref 12–20)
CA-I BLD-MCNC: 9 MG/DL (ref 8.5–10.1)
CHLORIDE SERPL-SCNC: 108 MMOL/L (ref 97–108)
CO2 SERPL-SCNC: 25 MMOL/L (ref 21–32)
CREAT SERPL-MCNC: 0.76 MG/DL (ref 0.55–1.02)
DIFFERENTIAL METHOD BLD: ABNORMAL
EOSINOPHIL # BLD: 0.1 K/UL (ref 0–0.4)
EOSINOPHIL NFR BLD: 2 % (ref 0–7)
ERYTHROCYTE [DISTWIDTH] IN BLOOD BY AUTOMATED COUNT: 15.4 % (ref 11.5–14.5)
GLOBULIN SER CALC-MCNC: 4.4 G/DL (ref 2–4)
GLUCOSE SERPL-MCNC: 85 MG/DL (ref 65–100)
HCT VFR BLD AUTO: 35.9 % (ref 35–47)
HGB BLD-MCNC: 11.4 G/DL (ref 11.5–16)
IMM GRANULOCYTES # BLD AUTO: 0 K/UL (ref 0–0.04)
IMM GRANULOCYTES NFR BLD AUTO: 0 % (ref 0–0.5)
LYMPHOCYTES # BLD: 2.6 K/UL (ref 0.8–3.5)
LYMPHOCYTES NFR BLD: 40 % (ref 12–49)
MCH RBC QN AUTO: 25.6 PG (ref 26–34)
MCHC RBC AUTO-ENTMCNC: 31.8 G/DL (ref 30–36.5)
MCV RBC AUTO: 80.5 FL (ref 80–99)
MONOCYTES # BLD: 0.5 K/UL (ref 0–1)
MONOCYTES NFR BLD: 8 % (ref 5–13)
NEUTS SEG # BLD: 3.3 K/UL (ref 1.8–8)
NEUTS SEG NFR BLD: 49 % (ref 32–75)
NRBC # BLD: 0 K/UL (ref 0–0.01)
NRBC BLD-RTO: 0 PER 100 WBC
PLATELET # BLD AUTO: 352 K/UL (ref 150–400)
PMV BLD AUTO: 11.2 FL (ref 8.9–12.9)
POTASSIUM SERPL-SCNC: 3.7 MMOL/L (ref 3.5–5.1)
PROT SERPL-MCNC: 7.5 G/DL (ref 6.4–8.2)
RBC # BLD AUTO: 4.46 M/UL (ref 3.8–5.2)
SODIUM SERPL-SCNC: 139 MMOL/L (ref 136–145)
WBC # BLD AUTO: 6.6 K/UL (ref 3.6–11)

## 2023-01-25 PROCEDURE — 36415 COLL VENOUS BLD VENIPUNCTURE: CPT

## 2023-01-25 PROCEDURE — 80053 COMPREHEN METABOLIC PANEL: CPT

## 2023-01-25 PROCEDURE — 85025 COMPLETE CBC W/AUTO DIFF WBC: CPT

## 2023-01-25 PROCEDURE — 74011250637 HC RX REV CODE- 250/637: Performed by: STUDENT IN AN ORGANIZED HEALTH CARE EDUCATION/TRAINING PROGRAM

## 2023-01-25 PROCEDURE — 99283 EMERGENCY DEPT VISIT LOW MDM: CPT

## 2023-01-25 PROCEDURE — 80235 DRUG ASSAY LACOSAMIDE: CPT

## 2023-01-25 RX ORDER — LACOSAMIDE 100 MG/1
200 TABLET ORAL
Status: COMPLETED | OUTPATIENT
Start: 2023-01-25 | End: 2023-01-25

## 2023-01-25 RX ADMIN — BRIVARACETAM 100 MG: 50 TABLET, FILM COATED ORAL at 06:43

## 2023-01-25 RX ADMIN — LACOSAMIDE 200 MG: 100 TABLET, FILM COATED ORAL at 06:43

## 2023-01-25 NOTE — ED PROVIDER NOTES
[facility] EMERGENCY DEPT  EMERGENCY DEPARTMENT HISTORY AND PHYSICAL EXAM      Date: 1/25/2023  Patient Name: Narinder Nichols  MRN: [id number]  [place] 1995  Date of evaluation: 1/25/2023  Provider: Ru Hawkins MD   Note Started: 6:41 AM 1/25/23    HISTORY OF PRESENT ILLNESS     Chief Complaint   Patient presents with    Seizure     0400 lasted for about 10 min. History Provided By: Patient    HPI: Narinder Nichols, 32 y.o. female presents for evaluation of seizure-like activity. Per patient, she was told by EMS that family member woke up to see the patient with seizure-like activity. EMS was called. Patient recalls going to sleep and waking up in the ambulance. She felt drowsy and somewhat confused. No focal motor weakness or loss of sensation. No urinary incontinence but she has bitten the right side of her tongue. No changes to her antiepileptic medications since her last breakthrough seizure in November when she was taken off of Keppra. She is currently on Vimpat and Briviact. She has been well recently with no sick symptoms, no dehydration, no drug or alcohol use. PAST MEDICAL HISTORY   Past Medical History:  Past Medical History:   Diagnosis Date    Anemia 11/13/2019    Asthma     Depression     Epilepsy ([facility] [address]     Mood disorder ([address]     Seizures ([facility] [address]     last seizure two weeks ago    Sleep disorder     Suicidal thoughts        Past Surgical History:  Past Surgical History:   Procedure Laterality Date    HX OTHER SURGICAL Left 2008    Left eye cyst removal       Family History:  No family history on file. Social History:  Social History     Tobacco Use    Smoking status: Every Day    Smokeless tobacco: Current   Substance Use Topics    Alcohol use: Yes    Drug use: No       Allergies:   Allergies   Allergen Reactions    Citrus And Derivatives Hives    Lamictal [Lamotrigine] Nausea and Vomiting       PCP: Amparo Jacinto MD    Current Meds:   Previous Medications    ALBUTEROL (PROVENTIL HFA, VENTOLIN HFA, PROAIR HFA) 90 MCG/ACTUATION INHALER    Take 2 Puffs by inhalation every four (4) hours as needed for Wheezing, Shortness of Breath or Respiratory Distress. ARIPIPRAZOLE (ABILIFY) 5 MG TABLET    Take 1 Tablet by mouth daily. Indications: additional medications to treat depression    BRIVARACETAM (BRIVIACT) 100 MG TABLET    Take 100 mg by mouth two (2) times a day. ESCITALOPRAM OXALATE (LEXAPRO) 20 MG TABLET    Take 1 Tablet by mouth daily. Indications: major depressive disorder    GUAIFENESIN ER (MUCINEX) 600 MG ER TABLET    Take 1 Tablet by mouth two (2) times a day. LACOSAMIDE (VIMPAT) 100 MG TAB TABLET    Take 1 Tablet by mouth two (2) times a day. Max Daily Amount: 200 mg. LACOSAMIDE (VIMPAT) 200 MG TAB TABLET    Take 200 mg by mouth two (2) times a day. LEVETIRACETAM 1,000 MG TABLET    Take 1 Tablet by mouth two (2) times a day. Indications: additional medication for tonic-clonic epilepsy    MIRTAZAPINE (REMERON) 15 MG TABLET    Take 15 mg by mouth nightly. OMEPRAZOLE (PRILOSEC) 20 MG CAPSULE    omeprazole 20 mg capsule,delayed release    ONDANSETRON (ZOFRAN ODT) 8 MG DISINTEGRATING TABLET    Take 1 Tab by mouth every eight (8) hours as needed for Nausea. PRENATAL VIT-CALCIUM-IRON-FA (PRENATAL PLUS WITH CALCIUM) 27 MG IRON- 1 MG TAB    Take 1 Tab by mouth daily. Indications: PREGNANCY    TRAZODONE (DESYREL) 50 MG TABLET    Take 1 Tablet by mouth nightly as needed for Sleep. Indications: insomnia associated with depression    VENLAFAXINE-SR (EFFEXOR-XR) 150 MG CAPSULE    venlafaxine  mg capsule,extended release 24 hr   TK 1 C PO QD       REVIEW OF SYSTEMS   Review of Systems   Constitutional:  Negative for fever. HENT:  Negative for congestion. Respiratory:  Negative for cough and shortness of breath. Cardiovascular:  Negative for chest pain. Gastrointestinal:  Negative for abdominal pain, constipation, nausea and vomiting.    Genitourinary: Negative for dysuria. Musculoskeletal:  Negative for arthralgias and myalgias. Skin:  Negative for rash. Allergic/Immunologic: Negative for immunocompromised state. Neurological:  Positive for seizures and syncope. Psychiatric/Behavioral:  Negative for confusion. Positives and Pertinent negatives as per HPI. PHYSICAL EXAM     ED Triage Vitals [01/25/23 0509]   ED Encounter Vitals Group      /84      Pulse (Heart Rate) 95      Resp Rate 18      Temp 98.3 °F (36.8 °C)      Temp src       O2 Sat (%) 98 %      Weight 200 lb      Height 5' 4\"     Physical Exam  Constitutional:       Appearance: Normal appearance. HENT:      Head: Normocephalic and atraumatic. Nose: Nose normal.      Mouth/Throat:      Mouth: Mucous membranes are moist.   Eyes:      Conjunctiva/sclera: Conjunctivae normal.      Pupils: Pupils are equal, round, and reactive to light. Cardiovascular:      Rate and Rhythm: Normal rate and regular rhythm. Heart sounds: Normal heart sounds. Pulmonary:      Effort: Pulmonary effort is normal.      Breath sounds: Normal breath sounds. Abdominal:      General: There is no distension. Palpations: Abdomen is soft. Tenderness: There is no abdominal tenderness. Musculoskeletal:         General: No tenderness or deformity. Normal range of motion. Cervical back: Normal range of motion and neck supple. Skin:     General: Skin is warm and dry. Neurological:      General: No focal deficit present. Mental Status: She is alert and oriented to person, place, and time.    Psychiatric:         Mood and Affect: Mood normal.         Behavior: Behavior normal.          SCREENINGS               No data recorded        LAB, EKG AND DIAGNOSTIC RESULTS   Labs:  Recent Results (from the past 12 hour(s))   CBC WITH AUTOMATED DIFF    Collection Time: 01/25/23  5:25 AM   Result Value Ref Range    WBC 6.6 3.6 - 11.0 K/uL    RBC 4.46 3.80 - 5.20 M/uL    HGB 11.4 (L) 11.5 - 16.0 g/dL    HCT 35.9 35.0 - 47.0 %    MCV 80.5 80.0 - 99.0 FL    MCH 25.6 (L) 26.0 - 34.0 PG    MCHC 31.8 30.0 - 36.5 g/dL    RDW 15.4 (H) 11.5 - 14.5 %    PLATELET 687 220 - 736 K/uL    MPV 11.2 8.9 - 12.9 FL    NRBC 0.0 0.0  WBC    ABSOLUTE NRBC 0.00 0.00 - 0.01 K/uL    NEUTROPHILS 49 32 - 75 %    LYMPHOCYTES 40 12 - 49 %    MONOCYTES 8 5 - 13 %    EOSINOPHILS 2 0 - 7 %    BASOPHILS 1 0 - 1 %    IMMATURE GRANULOCYTES 0 0 - 0.5 %    ABS. NEUTROPHILS 3.3 1.8 - 8.0 K/UL    ABS. LYMPHOCYTES 2.6 0.8 - 3.5 K/UL    ABS. MONOCYTES 0.5 0.0 - 1.0 K/UL    ABS. EOSINOPHILS 0.1 0.0 - 0.4 K/UL    ABS. BASOPHILS 0.1 0.0 - 0.1 K/UL    ABS. IMM. GRANS. 0.0 0.00 - 0.04 K/UL    DF AUTOMATED     METABOLIC PANEL, COMPREHENSIVE    Collection Time: 01/25/23  5:25 AM   Result Value Ref Range    Sodium 139 136 - 145 mmol/L    Potassium 3.7 3.5 - 5.1 mmol/L    Chloride 108 97 - 108 mmol/L    CO2 25 21 - 32 mmol/L    Anion gap 6 5 - 15 mmol/L    Glucose 85 65 - 100 mg/dL    BUN 9 6 - 20 mg/dL    Creatinine 0.76 0.55 - 1.02 mg/dL    BUN/Creatinine ratio 12 12 - 20      eGFR >60 >60 ml/min/1.73m2    Calcium 9.0 8.5 - 10.1 mg/dL    Bilirubin, total 0.2 0.2 - 1.0 mg/dL    AST (SGOT) 5 (L) 15 - 37 U/L    ALT (SGPT) 14 12 - 78 U/L    Alk. phosphatase 90 45 - 117 U/L    Protein, total 7.5 6.4 - 8.2 g/dL    Albumin 3.1 (L) 3.5 - 5.0 g/dL    Globulin 4.4 (H) 2.0 - 4.0 g/dL    A-G Ratio 0.7 (L) 1.1 - 2.2         Radiologic Studies:  Interpretation per the Radiologist below, if available at the time of this note:  No results found.      PROCEDURES   Unless otherwise noted below, none  Performed by: Roseline Frey MD   Procedures        EMERGENCY DEPARTMENT COURSE and DIFFERENTIAL DIAGNOSIS/MDM   Vitals:    Vitals:    01/25/23 0509   BP: 108/84   Pulse: 95   Resp: 18   Temp: 98.3 °F (36.8 °C)   SpO2: 98%   Weight: 90.7 kg (200 lb)   Height: 5' 4\" (1.626 m)        Patient was given the following medications:  Medications   lacosamide (VIMPAT) tablet 200 mg (200 mg Oral Given 1/25/23 2426)   brivaracetam (BRIVIACT) tablet 100 mg (100 mg Oral Given 1/25/23 7488)       CC/HPI Summary, DDx, ED Course, and Reassessment:   9year-old female presents for evaluation of seizure-like activity witnessed at home. She is back at her neurologic baseline now. Concern for breakthrough seizure based on report given. Unclear etiology but will evaluate for electrolyte imbalance, anemia, leukocytosis with lab work. She is neurologically intact with no recent head trauma so no indication for head imaging at this time. We will give her morning doses of her antiepileptics and send Vimpat level. ED FINAL IMPRESSION     1. Breakthrough seizure [facility])          DISPOSITION/PLAN   Discharged    Discharge Note: The patient is stable for discharge home. The signs, symptoms, diagnosis, and discharge instructions have been discussed, understanding conveyed, and agreed upon. The patient is to follow up as recommended or return to ER should their symptoms worsen. PATIENT REFERRED TO:  Follow-up Information       Follow up With Specialties Details Why Contact Info    Your neurologist  In 2 days      Venita Issa MD [facility]   [address] [facility]  [phone]                DISCHARGE MEDICATIONS:  Current Discharge Medication List            DISCONTINUED MEDICATIONS:  Current Discharge Medication List          I am the Primary Clinician of Record. Divine Jay MD (electronically signed)    (Please note that parts of this dictation were completed with voice recognition software. Quite often unanticipated grammatical, syntax, homophones, and other interpretive errors are inadvertently transcribed by the computer software. Please disregards these errors.  Please excuse any errors that have escaped final proofreading.)

## 2023-01-25 NOTE — Clinical Note
[facility] EMERGENCY DEPT  [address] [place] [id number]  [phone]    Work/School Note    Date: 1/25/2023    To Whom It May concern:      Leyla Loomis was seen and treated today in the emergency room by the following provider(s):  Attending Provider: Isa Lou MD.      Leyla Loomis is excused from work/school on 01/25/23. She is clear to return to work/school on 01/26/23.         Sincerely,          Farhan Todd MD

## 2023-01-27 LAB — LACOSAMIDE SERPL-MCNC: 1.4 UG/ML (ref 5–10)

## 2024-07-10 ENCOUNTER — OFFICE VISIT (OUTPATIENT)
Facility: CLINIC | Age: 29
End: 2024-07-10
Payer: MEDICAID

## 2024-07-10 VITALS
DIASTOLIC BLOOD PRESSURE: 77 MMHG | HEART RATE: 82 BPM | HEIGHT: 64 IN | RESPIRATION RATE: 16 BRPM | SYSTOLIC BLOOD PRESSURE: 109 MMHG | TEMPERATURE: 98.3 F | BODY MASS INDEX: 33.87 KG/M2 | WEIGHT: 198.4 LBS | OXYGEN SATURATION: 99 %

## 2024-07-10 DIAGNOSIS — R11.2 NAUSEA AND VOMITING, UNSPECIFIED VOMITING TYPE: ICD-10-CM

## 2024-07-10 DIAGNOSIS — J45.20 MILD INTERMITTENT ASTHMA WITHOUT COMPLICATION: ICD-10-CM

## 2024-07-10 DIAGNOSIS — K52.9 ACUTE GASTROENTERITIS: ICD-10-CM

## 2024-07-10 DIAGNOSIS — F31.32 MODERATE DEPRESSED BIPOLAR I DISORDER (HCC): Primary | ICD-10-CM

## 2024-07-10 DIAGNOSIS — O02.1 MISSED ABORTION: ICD-10-CM

## 2024-07-10 DIAGNOSIS — R19.7 DIARRHEA, UNSPECIFIED TYPE: ICD-10-CM

## 2024-07-10 DIAGNOSIS — K29.00 ACUTE GASTRITIS WITHOUT HEMORRHAGE, UNSPECIFIED GASTRITIS TYPE: ICD-10-CM

## 2024-07-10 PROCEDURE — 99204 OFFICE O/P NEW MOD 45 MIN: CPT | Performed by: FAMILY MEDICINE

## 2024-07-10 RX ORDER — LOPERAMIDE HYDROCHLORIDE 2 MG/1
2 CAPSULE ORAL 3 TIMES DAILY PRN
Qty: 30 CAPSULE | Refills: 0 | Status: SHIPPED | OUTPATIENT
Start: 2024-07-10 | End: 2024-07-20

## 2024-07-10 RX ORDER — ONDANSETRON 4 MG/1
4 TABLET, FILM COATED ORAL EVERY 12 HOURS PRN
Qty: 20 TABLET | Refills: 0 | Status: SHIPPED | OUTPATIENT
Start: 2024-07-10 | End: 2024-07-20

## 2024-07-10 SDOH — ECONOMIC STABILITY: HOUSING INSECURITY
IN THE LAST 12 MONTHS, WAS THERE A TIME WHEN YOU DID NOT HAVE A STEADY PLACE TO SLEEP OR SLEPT IN A SHELTER (INCLUDING NOW)?: NO

## 2024-07-10 SDOH — ECONOMIC STABILITY: INCOME INSECURITY: HOW HARD IS IT FOR YOU TO PAY FOR THE VERY BASICS LIKE FOOD, HOUSING, MEDICAL CARE, AND HEATING?: NOT HARD AT ALL

## 2024-07-10 SDOH — ECONOMIC STABILITY: FOOD INSECURITY: WITHIN THE PAST 12 MONTHS, YOU WORRIED THAT YOUR FOOD WOULD RUN OUT BEFORE YOU GOT MONEY TO BUY MORE.: NEVER TRUE

## 2024-07-10 SDOH — HEALTH STABILITY: PHYSICAL HEALTH: ON AVERAGE, HOW MANY DAYS PER WEEK DO YOU ENGAGE IN MODERATE TO STRENUOUS EXERCISE (LIKE A BRISK WALK)?: 6 DAYS

## 2024-07-10 SDOH — ECONOMIC STABILITY: FOOD INSECURITY: WITHIN THE PAST 12 MONTHS, THE FOOD YOU BOUGHT JUST DIDN'T LAST AND YOU DIDN'T HAVE MONEY TO GET MORE.: NEVER TRUE

## 2024-07-10 ASSESSMENT — PATIENT HEALTH QUESTIONNAIRE - PHQ9
8. MOVING OR SPEAKING SO SLOWLY THAT OTHER PEOPLE COULD HAVE NOTICED. OR THE OPPOSITE, BEING SO FIGETY OR RESTLESS THAT YOU HAVE BEEN MOVING AROUND A LOT MORE THAN USUAL: NOT AT ALL
SUM OF ALL RESPONSES TO PHQ9 QUESTIONS 1 & 2: 2
3. TROUBLE FALLING OR STAYING ASLEEP: SEVERAL DAYS
SUM OF ALL RESPONSES TO PHQ QUESTIONS 1-9: 5
1. LITTLE INTEREST OR PLEASURE IN DOING THINGS: SEVERAL DAYS
SUM OF ALL RESPONSES TO PHQ QUESTIONS 1-9: 5
5. POOR APPETITE OR OVEREATING: SEVERAL DAYS
2. FEELING DOWN, DEPRESSED OR HOPELESS: SEVERAL DAYS
9. THOUGHTS THAT YOU WOULD BE BETTER OFF DEAD, OR OF HURTING YOURSELF: NOT AT ALL
6. FEELING BAD ABOUT YOURSELF - OR THAT YOU ARE A FAILURE OR HAVE LET YOURSELF OR YOUR FAMILY DOWN: NOT AT ALL
SUM OF ALL RESPONSES TO PHQ QUESTIONS 1-9: 5
10. IF YOU CHECKED OFF ANY PROBLEMS, HOW DIFFICULT HAVE THESE PROBLEMS MADE IT FOR YOU TO DO YOUR WORK, TAKE CARE OF THINGS AT HOME, OR GET ALONG WITH OTHER PEOPLE: NOT DIFFICULT AT ALL
4. FEELING TIRED OR HAVING LITTLE ENERGY: SEVERAL DAYS
7. TROUBLE CONCENTRATING ON THINGS, SUCH AS READING THE NEWSPAPER OR WATCHING TELEVISION: NOT AT ALL
SUM OF ALL RESPONSES TO PHQ QUESTIONS 1-9: 5

## 2024-07-10 NOTE — PROGRESS NOTES
Chief Complaint   Patient presents with    Follow-Up from Hospital     Pt here for hospital ER follow up visit for N/V/D, weakness and dizziness     /77 (Site: Right Upper Arm, Position: Sitting, Cuff Size: Large Adult)   Pulse 82   Temp 98.3 °F (36.8 °C) (Oral)   Resp 16   Ht 1.626 m (5' 4\")   Wt 90 kg (198 lb 6.4 oz)   LMP 02/28/2024 (Approximate) Comment: have irregular cycles  SpO2 99%   BMI 34.06 kg/m²     \"Have you been to the ER, urgent care clinic since your last visit?  Hospitalized since your last visit?\"    NO    “Have you seen or consulted any other health care providers outside of [facility] since your last visit?”    NO     “Have you had a pap smear?”    YES - Where: [facility], 2024                Click Here for Release of Records Request  
visit.     Allergies   Allergen Reactions    Carbamazepine Nausea And Vomiting and Nausea Only    Citrus Hives    Lamotrigine Nausea And Vomiting       Objective:  /77 (Site: Right Upper Arm, Position: Sitting, Cuff Size: Large Adult)   Pulse 82   Temp 98.3 °F (36.8 °C) (Oral)   Resp 16   Ht 1.626 m (5' 4\")   Wt 90 kg (198 lb 6.4 oz)   LMP 02/28/2024 (Approximate) Comment: have irregular cycles  SpO2 99%   BMI 34.06 kg/m²     Physical Exam:   Physical Exam  Vitals and nursing note reviewed.   Constitutional:       Appearance: Normal appearance. She is obese.   HENT:      Head: Normocephalic and atraumatic.      Right Ear: Tympanic membrane, ear canal and external ear normal.      Left Ear: Tympanic membrane, ear canal and external ear normal.      Nose: Nose normal.      Mouth/Throat:      Mouth: Mucous membranes are moist.      Pharynx: Oropharynx is clear.   Eyes:      Extraocular Movements: Extraocular movements intact.      Pupils: Pupils are equal, round, and reactive to light.   Cardiovascular:      Rate and Rhythm: Normal rate and regular rhythm.      Pulses: Normal pulses.      Heart sounds: Normal heart sounds.   Pulmonary:      Effort: Pulmonary effort is normal.      Breath sounds: Normal breath sounds.   Abdominal:      General: Abdomen is flat. Bowel sounds are normal.      Palpations: Abdomen is soft.   Musculoskeletal:         General: Normal range of motion.      Cervical back: Normal range of motion and neck supple.   Skin:     General: Skin is warm and dry.      Capillary Refill: Capillary refill takes less than 2 seconds.   Neurological:      General: No focal deficit present.      Mental Status: She is alert and oriented to person, place, and time. Mental status is at baseline.   Psychiatric:         Mood and Affect: Mood normal.         Behavior: Behavior normal.         Thought Content: Thought content normal.         Judgment: Judgment normal.              No results found for

## 2024-07-12 LAB
ALBUMIN SERPL-MCNC: 4 G/DL (ref 4–5)
ALP SERPL-CCNC: 87 IU/L (ref 44–121)
ALT SERPL-CCNC: 6 IU/L (ref 0–32)
AST SERPL-CCNC: 12 IU/L (ref 0–40)
BILIRUB SERPL-MCNC: <0.2 MG/DL (ref 0–1.2)
BUN SERPL-MCNC: 8 MG/DL (ref 6–20)
BUN/CREAT SERPL: 11 (ref 9–23)
CALCIUM SERPL-MCNC: 9.4 MG/DL (ref 8.7–10.2)
CHLORIDE SERPL-SCNC: 104 MMOL/L (ref 96–106)
CO2 SERPL-SCNC: 21 MMOL/L (ref 20–29)
CREAT SERPL-MCNC: 0.74 MG/DL (ref 0.57–1)
EGFRCR SERPLBLD CKD-EPI 2021: 112 ML/MIN/1.73
GLOBULIN SER CALC-MCNC: 2.9 G/DL (ref 1.5–4.5)
GLUCOSE SERPL-MCNC: 85 MG/DL (ref 70–99)
POTASSIUM SERPL-SCNC: 4.4 MMOL/L (ref 3.5–5.2)
PROT SERPL-MCNC: 6.9 G/DL (ref 6–8.5)
SODIUM SERPL-SCNC: 139 MMOL/L (ref 134–144)

## 2025-01-13 ENCOUNTER — APPOINTMENT (OUTPATIENT)
Facility: HOSPITAL | Age: 30
End: 2025-01-13
Payer: MEDICAID

## 2025-01-13 ENCOUNTER — HOSPITAL ENCOUNTER (EMERGENCY)
Facility: HOSPITAL | Age: 30
Discharge: HOME | End: 2025-01-13
Attending: EMERGENCY MEDICINE
Payer: MEDICAID

## 2025-01-13 VITALS
HEIGHT: 64 IN | BODY MASS INDEX: 32.61 KG/M2 | WEIGHT: 191 LBS | DIASTOLIC BLOOD PRESSURE: 74 MMHG | SYSTOLIC BLOOD PRESSURE: 137 MMHG | RESPIRATION RATE: 18 BRPM | HEART RATE: 82 BPM | TEMPERATURE: 98.6 F | OXYGEN SATURATION: 100 %

## 2025-01-13 DIAGNOSIS — O20.0 THREATENED ABORTION, ANTEPARTUM: Primary | ICD-10-CM

## 2025-01-13 DIAGNOSIS — O20.9 VAGINAL BLEEDING AFFECTING EARLY PREGNANCY: ICD-10-CM

## 2025-01-13 LAB
ANION GAP SERPL CALC-SCNC: 6 MMOL/L (ref 2–12)
APPEARANCE UR: CLEAR
BACTERIA URNS QL MICRO: ABNORMAL /HPF
BASOPHILS # BLD: 0.05 K/UL (ref 0–0.1)
BASOPHILS NFR BLD: 0.8 % (ref 0–1)
BILIRUB UR QL: NEGATIVE
BUN SERPL-MCNC: 6 MG/DL (ref 6–20)
BUN/CREAT SERPL: 9 (ref 12–20)
CALCIUM SERPL-MCNC: 9 MG/DL (ref 8.5–10.1)
CHLORIDE SERPL-SCNC: 108 MMOL/L (ref 97–108)
CO2 SERPL-SCNC: 24 MMOL/L (ref 21–32)
COLOR UR: ABNORMAL
CREAT SERPL-MCNC: 0.65 MG/DL (ref 0.55–1.02)
DIFFERENTIAL METHOD BLD: ABNORMAL
EOSINOPHIL # BLD: 0.03 K/UL (ref 0–0.4)
EOSINOPHIL NFR BLD: 0.5 % (ref 0–7)
EPITH CASTS URNS QL MICRO: ABNORMAL /LPF
ERYTHROCYTE [DISTWIDTH] IN BLOOD BY AUTOMATED COUNT: 18.5 % (ref 11.5–14.5)
GLUCOSE SERPL-MCNC: 107 MG/DL (ref 65–100)
GLUCOSE UR STRIP.AUTO-MCNC: NEGATIVE MG/DL
HCG SERPL-ACNC: 272 MIU/ML (ref 0–6)
HCT VFR BLD AUTO: 29.8 % (ref 35–47)
HGB BLD-MCNC: 9.5 G/DL (ref 11.5–16)
HGB UR QL STRIP: ABNORMAL
HYALINE CASTS URNS QL MICRO: ABNORMAL /LPF (ref 0–2)
IMM GRANULOCYTES # BLD AUTO: 0.02 K/UL (ref 0–0.04)
IMM GRANULOCYTES NFR BLD AUTO: 0.3 % (ref 0–0.5)
KETONES UR QL STRIP.AUTO: ABNORMAL MG/DL
LEUKOCYTE ESTERASE UR QL STRIP.AUTO: ABNORMAL
LYMPHOCYTES # BLD: 1.74 K/UL (ref 0.8–3.5)
LYMPHOCYTES NFR BLD: 28.2 % (ref 12–49)
MCH RBC QN AUTO: 23 PG (ref 26–34)
MCHC RBC AUTO-ENTMCNC: 31.9 G/DL (ref 30–36.5)
MCV RBC AUTO: 72.2 FL (ref 80–99)
MONOCYTES # BLD: 0.35 K/UL (ref 0–1)
MONOCYTES NFR BLD: 5.7 % (ref 5–13)
NEUTS SEG # BLD: 3.97 K/UL (ref 1.8–8)
NEUTS SEG NFR BLD: 64.5 % (ref 32–75)
NITRITE UR QL STRIP.AUTO: NEGATIVE
NRBC # BLD: 0 K/UL (ref 0–0.01)
NRBC BLD-RTO: 0 PER 100 WBC
PH UR STRIP: 5.5 (ref 5–8)
PLATELET # BLD AUTO: 387 K/UL (ref 150–400)
PMV BLD AUTO: 11 FL (ref 8.9–12.9)
POTASSIUM SERPL-SCNC: 3.6 MMOL/L (ref 3.5–5.1)
PROT UR STRIP-MCNC: ABNORMAL MG/DL
RBC # BLD AUTO: 4.13 M/UL (ref 3.8–5.2)
RBC #/AREA URNS HPF: ABNORMAL /HPF (ref 0–5)
SODIUM SERPL-SCNC: 138 MMOL/L (ref 136–145)
SP GR UR REFRACTOMETRY: 1.03 (ref 1–1.03)
URINE CULTURE IF INDICATED: ABNORMAL
UROBILINOGEN UR QL STRIP.AUTO: 1 EU/DL (ref 0.2–1)
WBC # BLD AUTO: 6.2 K/UL (ref 3.6–11)
WBC URNS QL MICRO: ABNORMAL /HPF (ref 0–4)

## 2025-01-13 PROCEDURE — 99284 EMERGENCY DEPT VISIT MOD MDM: CPT

## 2025-01-13 PROCEDURE — 80048 BASIC METABOLIC PNL TOTAL CA: CPT

## 2025-01-13 PROCEDURE — 94761 N-INVAS EAR/PLS OXIMETRY MLT: CPT

## 2025-01-13 PROCEDURE — 76817 TRANSVAGINAL US OBSTETRIC: CPT

## 2025-01-13 PROCEDURE — 84702 CHORIONIC GONADOTROPIN TEST: CPT

## 2025-01-13 PROCEDURE — 36415 COLL VENOUS BLD VENIPUNCTURE: CPT

## 2025-01-13 PROCEDURE — 85025 COMPLETE CBC W/AUTO DIFF WBC: CPT

## 2025-01-13 PROCEDURE — 81001 URINALYSIS AUTO W/SCOPE: CPT

## 2025-01-13 ASSESSMENT — PAIN DESCRIPTION - ORIENTATION: ORIENTATION: LOWER

## 2025-01-13 ASSESSMENT — PAIN DESCRIPTION - DESCRIPTORS: DESCRIPTORS: ACHING

## 2025-01-13 ASSESSMENT — LIFESTYLE VARIABLES
HOW MANY STANDARD DRINKS CONTAINING ALCOHOL DO YOU HAVE ON A TYPICAL DAY: PATIENT DECLINED
HOW OFTEN DO YOU HAVE A DRINK CONTAINING ALCOHOL: PATIENT DECLINED

## 2025-01-13 ASSESSMENT — ENCOUNTER SYMPTOMS
CHEST TIGHTNESS: 0
SHORTNESS OF BREATH: 0
ABDOMINAL PAIN: 1

## 2025-01-13 ASSESSMENT — PAIN DESCRIPTION - LOCATION: LOCATION: ABDOMEN;BACK

## 2025-01-13 ASSESSMENT — PAIN SCALES - GENERAL: PAINLEVEL_OUTOF10: 5

## 2025-01-13 NOTE — ED TRIAGE NOTES
Patient ambulatory to ER room for triage for complaints of vaginal bleeding starting today during pregnancy.  Reports soaking 2 pads x 1 hour this morning.     Reports positive pregnancy test x 1 week ago.     States she has irregular periods.    Endorses back pain and lower abdominal cramping starting at 0800    Provider at bedside during triage to assess patient.

## 2025-01-13 NOTE — ED PROVIDER NOTES
EKG's are interpreted by the Emergency Department Physician who either signs or Co-signs this chart in the absence of a cardiologist.        RADIOLOGY:   Non-plain film images such as CT, Ultrasound and MRI are read by the radiologist. Plain radiographic images are visualized and preliminarily interpreted by the emergency physician with the below findings:        Interpretation per the Radiologist below, if available at the time of this note:    US OB TRANSVAGINAL   Final Result   No intrauterine or extrauterine pregnancy identified at this time.      Electronically signed by DEMETRIUS PARKER           LABS:  Labs Reviewed   CBC WITH AUTO DIFFERENTIAL - Abnormal; Notable for the following components:       Result Value    Hemoglobin 9.5 (*)     Hematocrit 29.8 (*)     MCV 72.2 (*)     MCH 23.0 (*)     RDW 18.5 (*)     All other components within normal limits   BASIC METABOLIC PANEL - Abnormal; Notable for the following components:    Glucose 107 (*)     BUN/Creatinine Ratio 9 (*)     All other components within normal limits   HCG, QUANTITATIVE, PREGNANCY - Abnormal; Notable for the following components:    hCG Quant 272 (*)     All other components within normal limits   URINALYSIS WITH REFLEX TO CULTURE - Abnormal; Notable for the following components:    Protein, UA TRACE (*)     Ketones, Urine TRACE (*)     Blood, Urine SMALL (*)     Leukocyte Esterase, Urine TRACE (*)     Epithelial Cells, UA MODERATE (*)     BACTERIA, URINE 3+ (*)     All other components within normal limits   EXTRA TUBES HOLD   EXTRA TUBE, BLOOD BANK       All other labs were within normal range or not returned as of this dictation.    EMERGENCY DEPARTMENT COURSE and DIFFERENTIAL DIAGNOSIS/MDM:   Vitals:    Vitals:    01/13/25 1030 01/13/25 1100 01/13/25 1115 01/13/25 1130   BP: 113/63 129/79 123/72 137/74   Pulse:       Resp:       Temp:    98.6 °F (37 °C)   TempSrc:    Oral   SpO2: 100% 99% 100% 100%   Weight:       Height:

## 2025-01-13 NOTE — DISCHARGE INSTRUCTIONS
You need to follow up with your OB doctor to re-evaluate lab work in 2 days. If you experience significant bleeding, feel light headed or dizzy, have significant abdominal pain then you may need to return to the ED sooner for further evaluation.

## 2025-02-09 ENCOUNTER — HOSPITAL ENCOUNTER (EMERGENCY)
Facility: HOSPITAL | Age: 30
Discharge: HOME | End: 2025-02-09
Attending: EMERGENCY MEDICINE
Payer: MEDICAID

## 2025-02-09 VITALS
SYSTOLIC BLOOD PRESSURE: 118 MMHG | HEART RATE: 71 BPM | BODY MASS INDEX: 34.25 KG/M2 | WEIGHT: 200.62 LBS | DIASTOLIC BLOOD PRESSURE: 78 MMHG | HEIGHT: 64 IN | TEMPERATURE: 98.1 F | OXYGEN SATURATION: 100 % | RESPIRATION RATE: 18 BRPM

## 2025-02-09 DIAGNOSIS — R07.89 CHEST WALL TENDERNESS: ICD-10-CM

## 2025-02-09 DIAGNOSIS — J02.9 SORE THROAT: Primary | ICD-10-CM

## 2025-02-09 LAB
FLUAV RNA SPEC QL NAA+PROBE: NOT DETECTED
FLUBV RNA SPEC QL NAA+PROBE: NOT DETECTED
S PYO DNA THROAT QL NAA+PROBE: NOT DETECTED
SARS-COV-2 RNA RESP QL NAA+PROBE: NOT DETECTED
SOURCE: NORMAL

## 2025-02-09 PROCEDURE — 99283 EMERGENCY DEPT VISIT LOW MDM: CPT

## 2025-02-09 PROCEDURE — 87636 SARSCOV2 & INF A&B AMP PRB: CPT

## 2025-02-09 PROCEDURE — 87651 STREP A DNA AMP PROBE: CPT

## 2025-02-09 ASSESSMENT — PAIN DESCRIPTION - ORIENTATION: ORIENTATION: MID

## 2025-02-09 ASSESSMENT — PAIN DESCRIPTION - LOCATION: LOCATION: CHEST

## 2025-02-09 ASSESSMENT — PAIN DESCRIPTION - PAIN TYPE: TYPE: ACUTE PAIN

## 2025-02-09 ASSESSMENT — PAIN SCALES - GENERAL: PAINLEVEL_OUTOF10: 5

## 2025-02-09 ASSESSMENT — PAIN DESCRIPTION - FREQUENCY: FREQUENCY: CONTINUOUS

## 2025-02-09 ASSESSMENT — PAIN DESCRIPTION - DESCRIPTORS: DESCRIPTORS: TIGHTNESS

## 2025-02-09 ASSESSMENT — PAIN - FUNCTIONAL ASSESSMENT
PAIN_FUNCTIONAL_ASSESSMENT: 0-10
PAIN_FUNCTIONAL_ASSESSMENT: ACTIVITIES ARE NOT PREVENTED

## 2025-02-09 ASSESSMENT — PAIN DESCRIPTION - ONSET: ONSET: SUDDEN

## 2025-02-09 NOTE — ED TRIAGE NOTES
Patient reports SOB and chest tightness that started while at work. Had a sore throat since yesterday. Kids recently had the flu.  8 weeks pregnant.

## 2025-02-09 NOTE — ED PROVIDER NOTES
[facility] EMERGENCY DEPARTMENT  EMERGENCY DEPARTMENT ENCOUNTER      Pt Name: Violet Ramirez  MRN: [id number]  Birthdate 1995  Date of evaluation: 2/9/2025  Provider: ELLEN Lorenz NP    CHIEF COMPLAINT       Chief Complaint   Patient presents with    Shortness of Breath    Chest Pain         HISTORY OF PRESENT ILLNESS   (Location/Symptom, Timing/Onset, Context/Setting, Quality, Duration, Modifying Factors, Severity)  Note limiting factors.   HPI  Patient is a 29-year-old pregnant female with extensive past medical history (please see list below) who presents to the ED with sore throat, intermittent shortness of breath and congestion for several days.  Her children recently had the flu and she works at [employer] [facility] [employer] [employer] and has been exposed to multiple sick contacts.Denies fever, headache, neck pain, visual changes, focal weakness or rash. Denies any difficulty breathing, difficulty swallowing or chest pain. Denies any nausea, vomiting or diarrhea. Pt. Reports taking prenatal vitamins daily.  Old charts reviewed..       Dr. Esthela Melendez (OB/GYN)  Review of External Medical Records:     Nursing Notes were reviewed.    REVIEW OF SYSTEMS    (2-9 systems for level 4, 10 or more for level 5)     Review of Systems   Constitutional:  Negative for activity change, appetite change, fever and unexpected weight change.   HENT:  Positive for congestion and sore throat.    Respiratory:  Positive for cough, chest tightness and shortness of breath.    Cardiovascular:  Negative for chest pain, palpitations and leg swelling.   Gastrointestinal:  Negative for abdominal pain, diarrhea, nausea and vomiting.   Genitourinary:  Negative for dysuria, pelvic pain, vaginal bleeding and vaginal discharge.   Musculoskeletal:  Negative for back pain and neck pain.   Skin:  Negative for rash.   Neurological:  Negative for headaches.   All other systems reviewed and are negative.      Except as noted above the

## 2025-02-09 NOTE — DISCHARGE INSTRUCTIONS
Throw out your toothbrush and start fresh with a new toothbrush.  Increase your fluids to 8 ounces every hour that you are awake; include salty liquid such as soups, broths, seltzer water and Gatorade and alternate with water.  Take only plain Tylenol for pain, body aches and fever.  Close follow-up with PCP and/or OB/GYN if symptoms persist.   No

## 2025-02-10 ENCOUNTER — HOSPITAL ENCOUNTER (EMERGENCY)
Facility: HOSPITAL | Age: 30
Discharge: HOME | End: 2025-02-10
Attending: STUDENT IN AN ORGANIZED HEALTH CARE EDUCATION/TRAINING PROGRAM
Payer: MEDICAID

## 2025-02-10 VITALS
WEIGHT: 200 LBS | HEIGHT: 64 IN | RESPIRATION RATE: 17 BRPM | DIASTOLIC BLOOD PRESSURE: 63 MMHG | SYSTOLIC BLOOD PRESSURE: 101 MMHG | BODY MASS INDEX: 34.15 KG/M2 | TEMPERATURE: 98.6 F | HEART RATE: 67 BPM | OXYGEN SATURATION: 98 %

## 2025-02-10 DIAGNOSIS — J06.9 ACUTE UPPER RESPIRATORY INFECTION: Primary | ICD-10-CM

## 2025-02-10 LAB
ANION GAP SERPL CALC-SCNC: 5 MMOL/L (ref 2–12)
BASOPHILS # BLD: 0.02 K/UL (ref 0–0.1)
BASOPHILS NFR BLD: 0.5 % (ref 0–1)
BUN SERPL-MCNC: 5 MG/DL (ref 6–20)
BUN/CREAT SERPL: 9 (ref 12–20)
CA-I BLD-MCNC: 8.4 MG/DL (ref 8.5–10.1)
CHLORIDE SERPL-SCNC: 108 MMOL/L (ref 97–108)
CO2 SERPL-SCNC: 22 MMOL/L (ref 21–32)
CREAT SERPL-MCNC: 0.54 MG/DL (ref 0.55–1.02)
DIFFERENTIAL METHOD BLD: ABNORMAL
EOSINOPHIL # BLD: 0.03 K/UL (ref 0–0.4)
EOSINOPHIL NFR BLD: 0.8 % (ref 0–7)
ERYTHROCYTE [DISTWIDTH] IN BLOOD BY AUTOMATED COUNT: 18.4 % (ref 11.5–14.5)
GLUCOSE SERPL-MCNC: 85 MG/DL (ref 65–100)
HCT VFR BLD AUTO: 30.2 % (ref 35–47)
HGB BLD-MCNC: 9.4 G/DL (ref 11.5–16)
IMM GRANULOCYTES # BLD AUTO: 0 K/UL (ref 0–0.04)
IMM GRANULOCYTES NFR BLD AUTO: 0 % (ref 0–0.5)
LYMPHOCYTES # BLD: 1.52 K/UL (ref 0.8–3.5)
LYMPHOCYTES NFR BLD: 40.9 % (ref 12–49)
MCH RBC QN AUTO: 23 PG (ref 26–34)
MCHC RBC AUTO-ENTMCNC: 31.1 G/DL (ref 30–36.5)
MCV RBC AUTO: 73.8 FL (ref 80–99)
MONOCYTES # BLD: 0.39 K/UL (ref 0–1)
MONOCYTES NFR BLD: 10.5 % (ref 5–13)
NEUTS SEG # BLD: 1.76 K/UL (ref 1.8–8)
NEUTS SEG NFR BLD: 47.3 % (ref 32–75)
NRBC # BLD: 0 K/UL (ref 0–0.01)
NRBC BLD-RTO: 0 PER 100 WBC
PLATELET # BLD AUTO: 363 K/UL (ref 150–400)
PMV BLD AUTO: 12.1 FL (ref 8.9–12.9)
POTASSIUM SERPL-SCNC: ABNORMAL MMOL/L (ref 3.5–5.1)
RBC # BLD AUTO: 4.09 M/UL (ref 3.8–5.2)
SODIUM SERPL-SCNC: 135 MMOL/L (ref 136–145)
WBC # BLD AUTO: 3.7 K/UL (ref 3.6–11)

## 2025-02-10 PROCEDURE — 80048 BASIC METABOLIC PNL TOTAL CA: CPT

## 2025-02-10 PROCEDURE — 96374 THER/PROPH/DIAG INJ IV PUSH: CPT

## 2025-02-10 PROCEDURE — 36415 COLL VENOUS BLD VENIPUNCTURE: CPT

## 2025-02-10 PROCEDURE — 99284 EMERGENCY DEPT VISIT MOD MDM: CPT

## 2025-02-10 PROCEDURE — 85025 COMPLETE CBC W/AUTO DIFF WBC: CPT

## 2025-02-10 PROCEDURE — 6360000002 HC RX W HCPCS: Performed by: STUDENT IN AN ORGANIZED HEALTH CARE EDUCATION/TRAINING PROGRAM

## 2025-02-10 RX ORDER — METOCLOPRAMIDE 10 MG/1
10 TABLET ORAL 4 TIMES DAILY
Qty: 120 TABLET | Refills: 0 | Status: SHIPPED | OUTPATIENT
Start: 2025-02-10

## 2025-02-10 RX ORDER — METOCLOPRAMIDE HYDROCHLORIDE 5 MG/ML
10 INJECTION INTRAMUSCULAR; INTRAVENOUS ONCE
Status: COMPLETED | OUTPATIENT
Start: 2025-02-10 | End: 2025-02-10

## 2025-02-10 RX ADMIN — METOCLOPRAMIDE 10 MG: 5 INJECTION, SOLUTION INTRAMUSCULAR; INTRAVENOUS at 12:31

## 2025-02-10 ASSESSMENT — PAIN - FUNCTIONAL ASSESSMENT: PAIN_FUNCTIONAL_ASSESSMENT: 0-10

## 2025-02-10 ASSESSMENT — PAIN SCALES - GENERAL: PAINLEVEL_OUTOF10: 0

## 2025-02-10 NOTE — DISCHARGE INSTRUCTIONS
Thank you for choosing our Emergency Department for your care.  It is our privilege to care for you in your time of need.  In the next several days, you may receive a survey via email or mailed to your home about your experience with our team.  We would greatly appreciate you taking a few minutes to complete the survey, as we use this information to learn what we have done well and what we could be doing better. Thank you for trusting us with your care!    Below you will find a list of your tests from today's visit.   Labs and Radiology Studies  Recent Results (from the past 12 hour(s))   BMP    Collection Time: 02/10/25 12:31 PM   Result Value Ref Range    Sodium 135 (L) 136 - 145 mmol/L    Potassium Hemolyzed, recollect requested 3.5 - 5.1 mmol/L    Chloride 108 97 - 108 mmol/L    CO2 22 21 - 32 mmol/L    Anion Gap 5 2 - 12 mmol/L    Glucose 85 65 - 100 mg/dL    BUN 5 (L) 6 - 20 mg/dL    Creatinine 0.54 (L) 0.55 - 1.02 mg/dL    BUN/Creatinine Ratio 9 (L) 12 - 20      Est, Glom Filt Rate >90 >60 ml/min/1.73m2    Calcium 8.4 (L) 8.5 - 10.1 mg/dL   CBC with Auto Differential    Collection Time: 02/10/25 12:31 PM   Result Value Ref Range    WBC 3.7 3.6 - 11.0 K/uL    RBC 4.09 3.80 - 5.20 M/uL    Hemoglobin 9.4 (L) 11.5 - 16.0 g/dL    Hematocrit 30.2 (L) 35.0 - 47.0 %    MCV 73.8 (L) 80.0 - 99.0 FL    MCH 23.0 (L) 26.0 - 34.0 PG    MCHC 31.1 30.0 - 36.5 g/dL    RDW 18.4 (H) 11.5 - 14.5 %    Platelets 363 150 - 400 K/uL    MPV 12.1 8.9 - 12.9 FL    Nucleated RBCs 0.0 0.0  WBC    nRBC 0.00 0.00 - 0.01 K/uL    Neutrophils % 47.3 32.0 - 75.0 %    Lymphocytes % 40.9 12.0 - 49.0 %    Monocytes % 10.5 5.0 - 13.0 %    Eosinophils % 0.8 0.0 - 7.0 %    Basophils % 0.5 0.0 - 1.0 %    Immature Granulocytes % 0.0 0 - 0.5 %    Neutrophils Absolute 1.76 (L) 1.80 - 8.00 K/UL    Lymphocytes Absolute 1.52 0.80 - 3.50 K/UL    Monocytes Absolute 0.39 0.00 - 1.00 K/UL    Eosinophils Absolute 0.03 0.00 - 0.40 K/UL    Basophils

## 2025-02-10 NOTE — ED PROVIDER NOTES
Social History:  Social History     Tobacco Use    Smoking status: Every Day     Types: E-Cigarettes    Smokeless tobacco: Current   Substance Use Topics    Alcohol use: Yes    Drug use: No       Allergies:  Allergies   Allergen Reactions    Carbamazepine Nausea And Vomiting and Nausea Only    Citrus Hives    Lamotrigine Nausea And Vomiting       PCP: Jose Estrada MD    Current Meds:   No current facility-administered medications for this encounter.     Current Outpatient Medications   Medication Sig Dispense Refill    metoclopramide (REGLAN) 10 MG tablet Take 1 tablet by mouth 4 times daily 120 tablet 0    albuterol sulfate HFA (PROVENTIL;VENTOLIN;PROAIR) 108 (90 Base) MCG/ACT inhaler Inhale 2 puffs into the lungs every 4 hours as needed      ARIPiprazole (ABILIFY) 5 MG tablet Take 1 tablet by mouth daily      Brivaracetam (BRIVIACT) 100 MG TABS tablet Take 1 tablet by mouth 2 times daily.      escitalopram (LEXAPRO) 20 MG tablet Take 1 tablet by mouth daily      guaiFENesin (MUCINEX) 600 MG extended release tablet Take 600 mg by mouth 2 times daily      lacosamide (VIMPAT) 100 MG TABS tablet Take 100 mg by mouth 2 times daily.      lacosamide (VIMPAT) 200 MG tablet Take 200 mg by mouth 2 times daily.      levETIRAcetam (KEPPRA) 1000 MG tablet Take 1,000 mg by mouth 2 times daily      mirtazapine (REMERON) 15 MG tablet Take 15 mg by mouth nightly      omeprazole (PRILOSEC) 20 MG delayed release capsule omeprazole 20 mg capsule,delayed release      ondansetron (ZOFRAN-ODT) 8 MG TBDP disintegrating tablet Take 1 tablet by mouth every 8 hours as needed      traZODone (DESYREL) 50 MG tablet Take 50 mg by mouth      venlafaxine (EFFEXOR XR) 150 MG extended release capsule venlafaxine  mg capsule,extended release 24 hr   TK 1 C PO QD         Social Determinants of Health:   Social Determinants of Health     Tobacco Use: Medium Risk (2/3/2025)    Received from [facility]    Patient History     Smoking

## 2025-03-22 ENCOUNTER — HOSPITAL ENCOUNTER (EMERGENCY)
Facility: HOSPITAL | Age: 30
Discharge: HOME | End: 2025-03-22
Attending: EMERGENCY MEDICINE
Payer: MEDICAID

## 2025-03-22 ENCOUNTER — APPOINTMENT (OUTPATIENT)
Facility: HOSPITAL | Age: 30
End: 2025-03-22
Payer: MEDICAID

## 2025-03-22 VITALS
SYSTOLIC BLOOD PRESSURE: 134 MMHG | WEIGHT: 194.67 LBS | HEART RATE: 75 BPM | RESPIRATION RATE: 18 BRPM | DIASTOLIC BLOOD PRESSURE: 81 MMHG | BODY MASS INDEX: 33.23 KG/M2 | HEIGHT: 64 IN | OXYGEN SATURATION: 100 % | TEMPERATURE: 98 F

## 2025-03-22 DIAGNOSIS — Z3A.01 LESS THAN 8 WEEKS GESTATION OF PREGNANCY: Primary | ICD-10-CM

## 2025-03-22 DIAGNOSIS — N30.00 ACUTE CYSTITIS WITHOUT HEMATURIA: ICD-10-CM

## 2025-03-22 LAB
ALBUMIN SERPL-MCNC: 3.7 G/DL (ref 3.5–5)
ALBUMIN/GLOB SERPL: 0.8 (ref 1.1–2.2)
ALP SERPL-CCNC: 73 U/L (ref 45–117)
ALT SERPL-CCNC: 11 U/L (ref 12–78)
ANION GAP SERPL CALC-SCNC: 4 MMOL/L (ref 2–12)
APPEARANCE UR: CLEAR
AST SERPL-CCNC: 10 U/L (ref 15–37)
BACTERIA URNS QL MICRO: ABNORMAL /HPF
BASOPHILS # BLD: 0.05 K/UL (ref 0–0.1)
BASOPHILS NFR BLD: 0.8 % (ref 0–1)
BILIRUB SERPL-MCNC: 0.3 MG/DL (ref 0.2–1)
BILIRUB UR QL: NEGATIVE
BUN SERPL-MCNC: 6 MG/DL (ref 6–20)
BUN/CREAT SERPL: 9 (ref 12–20)
CALCIUM SERPL-MCNC: 9.5 MG/DL (ref 8.5–10.1)
CHLORIDE SERPL-SCNC: 102 MMOL/L (ref 97–108)
CLUE CELLS VAG QL WET PREP: NORMAL
CO2 SERPL-SCNC: 28 MMOL/L (ref 21–32)
COLOR UR: ABNORMAL
COMMENT:: NORMAL
CREAT SERPL-MCNC: 0.67 MG/DL (ref 0.55–1.02)
DIFFERENTIAL METHOD BLD: ABNORMAL
EOSINOPHIL # BLD: 0.01 K/UL (ref 0–0.4)
EOSINOPHIL NFR BLD: 0.2 % (ref 0–7)
EPITH CASTS URNS QL MICRO: ABNORMAL /LPF
ERYTHROCYTE [DISTWIDTH] IN BLOOD BY AUTOMATED COUNT: 17.2 % (ref 11.5–14.5)
GLOBULIN SER CALC-MCNC: 4.6 G/DL (ref 2–4)
GLUCOSE SERPL-MCNC: 80 MG/DL (ref 65–100)
GLUCOSE UR STRIP.AUTO-MCNC: NEGATIVE MG/DL
HCG SERPL-ACNC: 56 MIU/ML (ref 0–6)
HCG UR QL: POSITIVE
HCT VFR BLD AUTO: 29.6 % (ref 35–47)
HGB BLD-MCNC: 8.7 G/DL (ref 11.5–16)
HGB UR QL STRIP: ABNORMAL
IMM GRANULOCYTES # BLD AUTO: 0.01 K/UL (ref 0–0.04)
IMM GRANULOCYTES NFR BLD AUTO: 0.2 % (ref 0–0.5)
KETONES UR QL STRIP.AUTO: ABNORMAL MG/DL
LEUKOCYTE ESTERASE UR QL STRIP.AUTO: ABNORMAL
LYMPHOCYTES # BLD: 1.89 K/UL (ref 0.8–3.5)
LYMPHOCYTES NFR BLD: 30.2 % (ref 12–49)
MCH RBC QN AUTO: 21.4 PG (ref 26–34)
MCHC RBC AUTO-ENTMCNC: 29.4 G/DL (ref 30–36.5)
MCV RBC AUTO: 72.9 FL (ref 80–99)
MONOCYTES # BLD: 0.39 K/UL (ref 0–1)
MONOCYTES NFR BLD: 6.2 % (ref 5–13)
NEUTS SEG # BLD: 3.9 K/UL (ref 1.8–8)
NEUTS SEG NFR BLD: 62.4 % (ref 32–75)
NITRITE UR QL STRIP.AUTO: POSITIVE
NRBC # BLD: 0 K/UL (ref 0–0.01)
NRBC BLD-RTO: 0 PER 100 WBC
PH UR STRIP: 6 (ref 5–8)
PLATELET # BLD AUTO: 392 K/UL (ref 150–400)
PMV BLD AUTO: 11.4 FL (ref 8.9–12.9)
POTASSIUM SERPL-SCNC: 3.4 MMOL/L (ref 3.5–5.1)
PROT SERPL-MCNC: 8.3 G/DL (ref 6.4–8.2)
PROT UR STRIP-MCNC: 30 MG/DL
RBC # BLD AUTO: 4.06 M/UL (ref 3.8–5.2)
RBC #/AREA URNS HPF: ABNORMAL /HPF (ref 0–5)
SAC DIAMETER: 0.53 CM
SAC DIAMETER: 0.53 CM
SODIUM SERPL-SCNC: 134 MMOL/L (ref 136–145)
SP GR UR REFRACTOMETRY: 1.01 (ref 1–1.03)
SPECIMEN HOLD: NORMAL
T VAGINALIS VAG QL WET PREP: NORMAL
URINE CULTURE IF INDICATED: ABNORMAL
UROBILINOGEN UR QL STRIP.AUTO: 0.2 EU/DL (ref 0.2–1)
WBC # BLD AUTO: 6.3 K/UL (ref 3.6–11)
WBC URNS QL MICRO: ABNORMAL /HPF (ref 0–4)
YEAST WET PREP: NORMAL

## 2025-03-22 PROCEDURE — 87210 SMEAR WET MOUNT SALINE/INK: CPT

## 2025-03-22 PROCEDURE — 76817 TRANSVAGINAL US OBSTETRIC: CPT

## 2025-03-22 PROCEDURE — 87088 URINE BACTERIA CULTURE: CPT

## 2025-03-22 PROCEDURE — 81001 URINALYSIS AUTO W/SCOPE: CPT

## 2025-03-22 PROCEDURE — 87491 CHLMYD TRACH DNA AMP PROBE: CPT

## 2025-03-22 PROCEDURE — 99284 EMERGENCY DEPT VISIT MOD MDM: CPT

## 2025-03-22 PROCEDURE — 80053 COMPREHEN METABOLIC PANEL: CPT

## 2025-03-22 PROCEDURE — 87086 URINE CULTURE/COLONY COUNT: CPT

## 2025-03-22 PROCEDURE — 81025 URINE PREGNANCY TEST: CPT

## 2025-03-22 PROCEDURE — 85025 COMPLETE CBC W/AUTO DIFF WBC: CPT

## 2025-03-22 PROCEDURE — 87186 SC STD MICRODIL/AGAR DIL: CPT

## 2025-03-22 PROCEDURE — 84702 CHORIONIC GONADOTROPIN TEST: CPT

## 2025-03-22 PROCEDURE — 76801 OB US < 14 WKS SINGLE FETUS: CPT

## 2025-03-22 PROCEDURE — 87591 N.GONORRHOEAE DNA AMP PROB: CPT

## 2025-03-22 PROCEDURE — 6370000000 HC RX 637 (ALT 250 FOR IP): Performed by: EMERGENCY MEDICINE

## 2025-03-22 RX ORDER — CEPHALEXIN 500 MG/1
500 CAPSULE ORAL 2 TIMES DAILY
Qty: 14 CAPSULE | Refills: 0 | Status: SHIPPED | OUTPATIENT
Start: 2025-03-22 | End: 2025-03-29

## 2025-03-22 RX ORDER — CEPHALEXIN 500 MG/1
500 CAPSULE ORAL
Status: COMPLETED | OUTPATIENT
Start: 2025-03-22 | End: 2025-03-22

## 2025-03-22 RX ADMIN — CEPHALEXIN 500 MG: 500 CAPSULE ORAL at 18:47

## 2025-03-22 ASSESSMENT — PAIN DESCRIPTION - FREQUENCY: FREQUENCY: CONTINUOUS

## 2025-03-22 ASSESSMENT — PAIN SCALES - GENERAL: PAINLEVEL_OUTOF10: 7

## 2025-03-22 ASSESSMENT — PAIN DESCRIPTION - DESCRIPTORS: DESCRIPTORS: PRESSURE

## 2025-03-22 ASSESSMENT — PAIN DESCRIPTION - DIRECTION: RADIATING_TOWARDS: STOMACH

## 2025-03-22 ASSESSMENT — PAIN - FUNCTIONAL ASSESSMENT: PAIN_FUNCTIONAL_ASSESSMENT: 0-10

## 2025-03-22 ASSESSMENT — PAIN DESCRIPTION - LOCATION: LOCATION: VAGINA

## 2025-03-22 ASSESSMENT — PAIN DESCRIPTION - ORIENTATION: ORIENTATION: ANTERIOR

## 2025-03-22 ASSESSMENT — PAIN DESCRIPTION - PAIN TYPE: TYPE: ACUTE PAIN

## 2025-03-22 ASSESSMENT — PAIN DESCRIPTION - ONSET: ONSET: ON-GOING

## 2025-03-22 NOTE — DISCHARGE INSTRUCTIONS
Your pregnancy test was positive, it is unknown if this can still be related to your prior miscarriage. Please have your hormone level rechecked in 3-5 days (called beta HCG\"

## 2025-03-22 NOTE — ED PROVIDER NOTES
[facility] EMERGENCY DEPARTMENT  EMERGENCY DEPARTMENT ENCOUNTER      Pt Name: Violet Ramirez  MRN: [id number]  Birthdate 1995  Date of evaluation: 3/22/2025  Provider: Keya Huang DO    CHIEF COMPLAINT       Chief Complaint   Patient presents with    Vaginal Pain         HISTORY OF PRESENT ILLNESS   (Location/Symptom, Timing/Onset, Context/Setting, Quality, Duration, Modifying Factors, Severity)  Note limiting factors.   HPI      Review of External Medical Records:     Nursing Notes were reviewed.    REVIEW OF SYSTEMS    (2-9 systems for level 4, 10 or more for level 5)     Review of Systems    Except as noted above the remainder of the review of systems was reviewed and negative.       PAST MEDICAL HISTORY     Past Medical History:   Diagnosis Date    Anemia 11/13/2019    Anxiety     Asthma     Depression     Epilepsy (HCC)     Mood disorder     Seizures (HCC)     last seizure two weeks ago    Sleep disorder     Suicidal thoughts          SURGICAL HISTORY       Past Surgical History:   Procedure Laterality Date    OTHER SURGICAL HISTORY Left 2008    Left eye cyst removal         CURRENT MEDICATIONS       Current Discharge Medication List        CONTINUE these medications which have NOT CHANGED    Details   metoclopramide (REGLAN) 10 MG tablet Take 1 tablet by mouth 4 times daily  Qty: 120 tablet, Refills: 0      albuterol sulfate HFA (PROVENTIL;VENTOLIN;PROAIR) 108 (90 Base) MCG/ACT inhaler Inhale 2 puffs into the lungs every 4 hours as needed      ARIPiprazole (ABILIFY) 5 MG tablet Take 1 tablet by mouth daily      Brivaracetam (BRIVIACT) 100 MG TABS tablet Take 1 tablet by mouth 2 times daily.      escitalopram (LEXAPRO) 20 MG tablet Take 1 tablet by mouth daily      guaiFENesin (MUCINEX) 600 MG extended release tablet Take 600 mg by mouth 2 times daily      !! lacosamide (VIMPAT) 100 MG TABS tablet Take 100 mg by mouth 2 times daily.      !! lacosamide (VIMPAT) 200 MG tablet Take 200 mg by mouth 2 
minutes on the discharge service.

## 2025-03-23 ENCOUNTER — RESULTS FOLLOW-UP (OUTPATIENT)
Facility: HOSPITAL | Age: 30
End: 2025-03-23

## 2025-03-23 LAB
BACTERIA SPEC CULT: ABNORMAL
CC UR VC: ABNORMAL
SERVICE CMNT-IMP: ABNORMAL

## 2025-03-24 LAB
BACTERIA SPEC CULT: ABNORMAL
C TRACH DNA SPEC QL NAA+PROBE: NEGATIVE
CC UR VC: ABNORMAL
N GONORRHOEA DNA SPEC QL NAA+PROBE: NEGATIVE
SAMPLE TYPE: NORMAL
SERVICE CMNT-IMP: ABNORMAL
SERVICE CMNT-IMP: NORMAL
SPECIMEN SOURCE: NORMAL

## 2025-03-24 RX ORDER — NITROFURANTOIN 25; 75 MG/1; MG/1
100 CAPSULE ORAL 2 TIMES DAILY
Qty: 20 CAPSULE | Refills: 0 | Status: SHIPPED | OUTPATIENT
Start: 2025-03-24 | End: 2025-04-03

## 2025-03-24 NOTE — ED NOTES
Urine culture positive for ESBL E. Coli.  Called patient and phoned in a new prescription for Macrobid to her [facility] pharmacy.  She states that she will pick it up today and start today. ELLEN Lorenz NP, Lori, APRN - NP  03/24/25 [id number]

## 2025-08-15 ENCOUNTER — HOSPITAL ENCOUNTER (EMERGENCY)
Facility: HOSPITAL | Age: 30
Discharge: HOME | End: 2025-08-15
Payer: MEDICAID

## 2025-08-15 VITALS
SYSTOLIC BLOOD PRESSURE: 114 MMHG | RESPIRATION RATE: 17 BRPM | HEART RATE: 72 BPM | TEMPERATURE: 98.9 F | OXYGEN SATURATION: 100 % | DIASTOLIC BLOOD PRESSURE: 78 MMHG

## 2025-08-15 DIAGNOSIS — G40.919 BREAKTHROUGH SEIZURE (HCC): Primary | ICD-10-CM

## 2025-08-15 LAB
ALBUMIN SERPL-MCNC: 3.7 G/DL (ref 3.5–5.2)
ALBUMIN/GLOB SERPL: 1 (ref 1.1–2.2)
ALP SERPL-CCNC: 75 U/L (ref 35–104)
ALT SERPL-CCNC: 12 U/L (ref 10–35)
ANION GAP SERPL CALC-SCNC: 11 MMOL/L (ref 2–14)
APPEARANCE UR: CLEAR
AST SERPL-CCNC: 19 U/L (ref 10–35)
BACTERIA URNS QL MICRO: NEGATIVE /HPF
BASOPHILS # BLD: 0.04 K/UL (ref 0–0.1)
BASOPHILS NFR BLD: 0.7 % (ref 0–1)
BILIRUB SERPL-MCNC: 0.3 MG/DL (ref 0–1.2)
BILIRUB UR QL: NEGATIVE
BUN SERPL-MCNC: 6 MG/DL (ref 6–20)
BUN/CREAT SERPL: 11 (ref 12–20)
CALCIUM SERPL-MCNC: 9.8 MG/DL (ref 8.6–10)
CARBAMAZEPINE SERPL-MCNC: <2 UG/ML (ref 4–12)
CHLORIDE SERPL-SCNC: 103 MMOL/L (ref 98–107)
CO2 SERPL-SCNC: 22 MMOL/L (ref 20–29)
COLOR UR: ABNORMAL
COMMENT:: NORMAL
CREAT SERPL-MCNC: 0.57 MG/DL (ref 0.6–1)
DIFFERENTIAL METHOD BLD: ABNORMAL
EOSINOPHIL # BLD: 0.02 K/UL (ref 0–0.4)
EOSINOPHIL NFR BLD: 0.3 % (ref 0–7)
EPITH CASTS URNS QL MICRO: ABNORMAL /LPF
ERYTHROCYTE [DISTWIDTH] IN BLOOD BY AUTOMATED COUNT: 19.4 % (ref 11.5–14.5)
GLOBULIN SER CALC-MCNC: 3.7 G/DL (ref 2–4)
GLUCOSE SERPL-MCNC: 83 MG/DL (ref 65–100)
GLUCOSE UR STRIP.AUTO-MCNC: NEGATIVE MG/DL
HCG SERPL-ACNC: <1 MIU/ML
HCT VFR BLD AUTO: 31.6 % (ref 35–47)
HGB BLD-MCNC: 9.7 G/DL (ref 11.5–16)
HGB UR QL STRIP: NEGATIVE
IMM GRANULOCYTES # BLD AUTO: 0.01 K/UL (ref 0–0.04)
IMM GRANULOCYTES NFR BLD AUTO: 0.2 % (ref 0–0.5)
KETONES UR QL STRIP.AUTO: NEGATIVE MG/DL
LEUKOCYTE ESTERASE UR QL STRIP.AUTO: ABNORMAL
LYMPHOCYTES # BLD: 2.27 K/UL (ref 0.8–3.5)
LYMPHOCYTES NFR BLD: 39 % (ref 12–49)
MCH RBC QN AUTO: 22.5 PG (ref 26–34)
MCHC RBC AUTO-ENTMCNC: 30.7 G/DL (ref 30–36.5)
MCV RBC AUTO: 73.1 FL (ref 80–99)
MONOCYTES # BLD: 0.36 K/UL (ref 0–1)
MONOCYTES NFR BLD: 6.2 % (ref 5–13)
NEUTS SEG # BLD: 3.12 K/UL (ref 1.8–8)
NEUTS SEG NFR BLD: 53.6 % (ref 32–75)
NITRITE UR QL STRIP.AUTO: NEGATIVE
NRBC # BLD: 0 K/UL (ref 0–0.01)
NRBC BLD-RTO: 0 PER 100 WBC
PH UR STRIP: 7.5 (ref 5–8)
PLATELET # BLD AUTO: 341 K/UL (ref 150–400)
POTASSIUM SERPL-SCNC: 4.2 MMOL/L (ref 3.5–5.1)
PROT SERPL-MCNC: 7.3 G/DL (ref 6.4–8.3)
PROT UR STRIP-MCNC: NEGATIVE MG/DL
RBC # BLD AUTO: 4.32 M/UL (ref 3.8–5.2)
RBC #/AREA URNS HPF: ABNORMAL /HPF (ref 0–5)
SODIUM SERPL-SCNC: 136 MMOL/L (ref 136–145)
SP GR UR REFRACTOMETRY: 1.01
SPECIMEN HOLD: NORMAL
URINE CULTURE IF INDICATED: ABNORMAL
UROBILINOGEN UR QL STRIP.AUTO: 0.2 EU/DL (ref 0.2–1)
WBC # BLD AUTO: 5.8 K/UL (ref 3.6–11)
WBC URNS QL MICRO: ABNORMAL /HPF (ref 0–4)

## 2025-08-15 PROCEDURE — 85025 COMPLETE CBC W/AUTO DIFF WBC: CPT

## 2025-08-15 PROCEDURE — 81001 URINALYSIS AUTO W/SCOPE: CPT

## 2025-08-15 PROCEDURE — 80175 DRUG SCREEN QUAN LAMOTRIGINE: CPT

## 2025-08-15 PROCEDURE — 80156 ASSAY CARBAMAZEPINE TOTAL: CPT

## 2025-08-15 PROCEDURE — 84702 CHORIONIC GONADOTROPIN TEST: CPT

## 2025-08-15 PROCEDURE — 80053 COMPREHEN METABOLIC PANEL: CPT

## 2025-08-15 PROCEDURE — 36415 COLL VENOUS BLD VENIPUNCTURE: CPT

## 2025-08-15 PROCEDURE — 99283 EMERGENCY DEPT VISIT LOW MDM: CPT

## 2025-08-18 LAB — LAMOTRIGINE SERPL-MCNC: <1 UG/ML (ref 2–20)

## (undated) DEVICE — INTENDED FOR TISSUE SEPARATION, AND OTHER PROCEDURES THAT REQUIRE A SHARP SURGICAL BLADE TO PUNCTURE OR CUT.: Brand: BARD-PARKER ® CARBON RIB-BACK BLADES

## (undated) DEVICE — SUT MONOCRYL PLUS UD 4-0 --

## (undated) DEVICE — DRAPE,REIN 53X77,STERILE: Brand: MEDLINE

## (undated) DEVICE — Device

## (undated) DEVICE — HYPODERMIC SAFETY NEEDLE: Brand: MONOJECT

## (undated) DEVICE — CATHETER,URETHRAL,REDRUBBER,STRL,14FR: Brand: MEDLINE INDUSTRIES, INC.

## (undated) DEVICE — SOUTHSIDE TURNOVER: Brand: MEDLINE INDUSTRIES, INC.

## (undated) DEVICE — GLOVE ORTHO 7 1/2   MSG9475

## (undated) DEVICE — VISUALIZATION SYSTEM: Brand: CLEARIFY

## (undated) DEVICE — SOLUTION IRRIG 1000ML 0.9% SOD CHL USP POUR PLAS BTL

## (undated) DEVICE — GOWN,SIRUS,NONRNF,SETINSLV,XL,20/CS: Brand: MEDLINE

## (undated) DEVICE — 2, DISPOSABLE SUCTION/IRRIGATOR WITHOUT DISPOSABLE TIP: Brand: STRYKEFLOW

## (undated) DEVICE — SUTURE VCRL SZ 2-0 L27IN ABSRB VLT L26MM UR-6 5/8 CIR J602H

## (undated) DEVICE — TUBING INSUFFLATOR HEAT HUMIDIFIED SMK EVAC SET PNEUMOCLEAR

## (undated) DEVICE — PREP SKN CHLRAPRP APL 26ML STR --

## (undated) DEVICE — SPONGE GZ W4XL4IN COT 12 PLY TYP VII WVN C FLD DSGN

## (undated) DEVICE — SUTURE VCRL + SZ 2-0 L27IN ABSRB VLT UR-6 5/8 CIR TAPR PNT VCP602H

## (undated) DEVICE — TROCARS: Brand: KII® BALLOON BLUNT TIP SYSTEM

## (undated) DEVICE — TROCAR: Brand: KII SLEEVE

## (undated) DEVICE — ADHESIVE SKIN CLSR 0.7ML TOP DERMBND ADV

## (undated) DEVICE — TROCAR: Brand: KII SHIELDED BLADED ACCESS SYSTEM